# Patient Record
Sex: FEMALE | Race: WHITE | NOT HISPANIC OR LATINO | Employment: OTHER | URBAN - METROPOLITAN AREA
[De-identification: names, ages, dates, MRNs, and addresses within clinical notes are randomized per-mention and may not be internally consistent; named-entity substitution may affect disease eponyms.]

---

## 2017-02-24 RX ORDER — ESTRADIOL 0.1 MG/G
1 CREAM VAGINAL
Qty: 42.5 G | Refills: 3 | Status: SHIPPED | OUTPATIENT
Start: 2017-02-27 | End: 2017-03-01 | Stop reason: SDUPTHER

## 2017-02-26 ENCOUNTER — PATIENT MESSAGE (OUTPATIENT)
Dept: OBSTETRICS AND GYNECOLOGY | Facility: CLINIC | Age: 72
End: 2017-02-26

## 2017-02-27 ENCOUNTER — PATIENT MESSAGE (OUTPATIENT)
Dept: OBSTETRICS AND GYNECOLOGY | Facility: CLINIC | Age: 72
End: 2017-02-27

## 2017-02-27 NOTE — TELEPHONE ENCOUNTER
Voicemail box not set up.    Patient answered on second number and I let her know her prescription was sent in to the pharmacy.

## 2017-03-01 RX ORDER — ESTRADIOL 0.1 MG/G
1 CREAM VAGINAL
Qty: 42.5 G | Refills: 3 | Status: SHIPPED | OUTPATIENT
Start: 2017-03-02 | End: 2017-12-09 | Stop reason: SDUPTHER

## 2017-03-27 ENCOUNTER — PATIENT MESSAGE (OUTPATIENT)
Dept: OBSTETRICS AND GYNECOLOGY | Facility: CLINIC | Age: 72
End: 2017-03-27

## 2017-10-16 ENCOUNTER — PATIENT MESSAGE (OUTPATIENT)
Dept: OBSTETRICS AND GYNECOLOGY | Facility: CLINIC | Age: 72
End: 2017-10-16

## 2017-10-16 DIAGNOSIS — B37.31 CANDIDAL VAGINITIS: ICD-10-CM

## 2017-10-16 RX ORDER — CLOTRIMAZOLE AND BETAMETHASONE DIPROPIONATE 10; .64 MG/G; MG/G
CREAM TOPICAL
Qty: 45 G | Refills: 1 | Status: SHIPPED | OUTPATIENT
Start: 2017-10-16 | End: 2018-10-16

## 2017-10-16 RX ORDER — FLUCONAZOLE 150 MG/1
150 TABLET ORAL DAILY
Qty: 3 TABLET | Refills: 1 | Status: SHIPPED | OUTPATIENT
Start: 2017-10-16 | End: 2017-11-09 | Stop reason: ALTCHOICE

## 2017-11-09 ENCOUNTER — OFFICE VISIT (OUTPATIENT)
Dept: OBSTETRICS AND GYNECOLOGY | Facility: CLINIC | Age: 72
End: 2017-11-09
Payer: MEDICARE

## 2017-11-09 VITALS
BODY MASS INDEX: 24.2 KG/M2 | DIASTOLIC BLOOD PRESSURE: 68 MMHG | HEIGHT: 64 IN | WEIGHT: 141.75 LBS | SYSTOLIC BLOOD PRESSURE: 128 MMHG

## 2017-11-09 DIAGNOSIS — R30.0 DYSURIA: Primary | ICD-10-CM

## 2017-11-09 PROCEDURE — 99999 PR PBB SHADOW E&M-EST. PATIENT-LVL III: CPT | Mod: PBBFAC,,, | Performed by: NURSE PRACTITIONER

## 2017-11-09 PROCEDURE — 99213 OFFICE O/P EST LOW 20 MIN: CPT | Mod: S$GLB,,, | Performed by: NURSE PRACTITIONER

## 2017-11-09 PROCEDURE — 87086 URINE CULTURE/COLONY COUNT: CPT

## 2017-11-09 RX ORDER — NITROFURANTOIN 25; 75 MG/1; MG/1
100 CAPSULE ORAL 2 TIMES DAILY
Qty: 14 CAPSULE | Refills: 0 | Status: SHIPPED | OUTPATIENT
Start: 2017-11-09 | End: 2017-11-16

## 2017-11-09 RX ORDER — PHENAZOPYRIDINE HYDROCHLORIDE 200 MG/1
200 TABLET, FILM COATED ORAL 3 TIMES DAILY PRN
Qty: 9 TABLET | Refills: 1 | Status: SHIPPED | OUTPATIENT
Start: 2017-11-09 | End: 2017-11-19

## 2017-11-09 RX ORDER — LEVOCETIRIZINE DIHYDROCHLORIDE 5 MG/1
5 TABLET, FILM COATED ORAL DAILY
COMMUNITY
End: 2019-01-22

## 2017-11-09 NOTE — PROGRESS NOTES
"CC: Pain with voiding    Lucy Staples is a 71 y.o. female  presents for c/o dysuria for 4 days. No flank or pelvic pain. No fever or hematuria, Urine in clinic dip; positive leucocytes. Patient was seen in Urgent care and given Bactrim.    Past Medical History:   Diagnosis Date    Hyperlipidemia     Hypertension     IBS (irritable bowel syndrome)      Past Surgical History:   Procedure Laterality Date    HYSTERECTOMY      SINUS SURGERY       Social History     Social History    Marital status:      Spouse name: N/A    Number of children: N/A    Years of education: N/A     Occupational History    Not on file.     Social History Main Topics    Smoking status: Never Smoker    Smokeless tobacco: Never Used    Alcohol use No    Drug use: No    Sexual activity: No     Other Topics Concern    Not on file     Social History Narrative    No narrative on file     Family History   Problem Relation Age of Onset    Breast cancer Neg Hx     Colon cancer Neg Hx     Ovarian cancer Neg Hx      OB History      Para Term  AB Living    1 1 1     1    SAB TAB Ectopic Multiple Live Births                       Ht 5' 4" (1.626 m)   Wt 64.3 kg (141 lb 12.1 oz)   BMI 24.33 kg/m²       ROS:  GENERAL: Denies weight gain or weight loss. Feeling well overall  ABDOMEN: No abdominal pain, constipation, diarrhea, nausea, vomiting or rectal bleeding.   URINARY: HPI    PHYSICAL EXAM:  APPEARANCE: Well nourished, well developed, in no acute distress.  AFFECT: WNL, alert and oriented x 3  ABDOMEN: Soft.  No tenderness or masses.       PLAN:  Urine sent for cx  Stop Bactrim  Macrobid and Pyridium rx                "

## 2017-11-10 LAB — BACTERIA UR CULT: NORMAL

## 2017-11-13 ENCOUNTER — PATIENT MESSAGE (OUTPATIENT)
Dept: OBSTETRICS AND GYNECOLOGY | Facility: CLINIC | Age: 72
End: 2017-11-13

## 2017-12-01 ENCOUNTER — OFFICE VISIT (OUTPATIENT)
Dept: OBSTETRICS AND GYNECOLOGY | Facility: CLINIC | Age: 72
End: 2017-12-01
Payer: MEDICARE

## 2017-12-01 VITALS
WEIGHT: 142.19 LBS | SYSTOLIC BLOOD PRESSURE: 130 MMHG | BODY MASS INDEX: 24.28 KG/M2 | DIASTOLIC BLOOD PRESSURE: 78 MMHG | HEIGHT: 64 IN

## 2017-12-01 DIAGNOSIS — R30.0 DYSURIA: ICD-10-CM

## 2017-12-01 DIAGNOSIS — N89.8 VAGINAL IRRITATION: Primary | ICD-10-CM

## 2017-12-01 PROCEDURE — 87660 TRICHOMONAS VAGIN DIR PROBE: CPT

## 2017-12-01 PROCEDURE — 87480 CANDIDA DNA DIR PROBE: CPT

## 2017-12-01 PROCEDURE — 87186 SC STD MICRODIL/AGAR DIL: CPT

## 2017-12-01 PROCEDURE — 87086 URINE CULTURE/COLONY COUNT: CPT

## 2017-12-01 PROCEDURE — 87077 CULTURE AEROBIC IDENTIFY: CPT

## 2017-12-01 PROCEDURE — 99213 OFFICE O/P EST LOW 20 MIN: CPT | Mod: S$GLB,,, | Performed by: NURSE PRACTITIONER

## 2017-12-01 PROCEDURE — 99999 PR PBB SHADOW E&M-EST. PATIENT-LVL III: CPT | Mod: PBBFAC,,, | Performed by: NURSE PRACTITIONER

## 2017-12-01 PROCEDURE — 87088 URINE BACTERIA CULTURE: CPT

## 2017-12-01 NOTE — PROGRESS NOTES
"CC: Possible yeast infection     Lucy Staples is a 72 y.o. female  presents with concerns of possible yeast infection.Patient reports itching and irritation. Patient states that she has mild dysuria. Urine in clinic indicated nitrates.      Past Medical History:   Diagnosis Date    Hyperlipidemia     Hypertension     IBS (irritable bowel syndrome)     Rectocele     Recurrent UTI      Past Surgical History:   Procedure Laterality Date    HYSTERECTOMY      SINUS SURGERY       Family History   Problem Relation Age of Onset    Breast cancer Neg Hx     Colon cancer Neg Hx     Ovarian cancer Neg Hx      Social History   Substance Use Topics    Smoking status: Never Smoker    Smokeless tobacco: Never Used    Alcohol use No     OB History      Para Term  AB Living    1 1 1     1    SAB TAB Ectopic Multiple Live Births                       /78 (BP Location: Right arm, Patient Position: Sitting, BP Method: Medium (Manual))   Ht 5' 4" (1.626 m)   Wt 64.5 kg (142 lb 3.2 oz)   BMI 24.41 kg/m²     ROS:  GENERAL: Denies weight gain or weight loss. Feeling well overall.   CHEST: Denies chest pain or shortness of breath.   CARDIOVASCULAR: Denies palpitations or left sided chest pain.   ABDOMEN: No abdominal pain, constipation, diarrhea, nausea, vomiting or rectal bleeding.   URINARY:HPI  REPRODUCTIVE: See HPI.       PE:   APPEARANCE: Older female, in no acute distress.  AFFECT: WNL, alert and oriented x 3.  PELVIC: Normal external female genitalia without lesions.Vagina atrophic without lesions or discharge. Grade III rectocele.     1. Vaginal irritation  Vaginosis Screen by DNA Probe   2. Dysuria  Urine culture    PLAN:  Urine sent for cx  Affirm cx, exam was unremarkable  Will wit to treat per patient request      Patient was counseled today on A.C.S. Pap guidelines and recommendations for yearly pelvic exams, mammograms and monthly self breast exams; to see her PCP for " other health maintenance.

## 2017-12-02 ENCOUNTER — PATIENT MESSAGE (OUTPATIENT)
Dept: OBSTETRICS AND GYNECOLOGY | Facility: CLINIC | Age: 72
End: 2017-12-02

## 2017-12-03 ENCOUNTER — PATIENT MESSAGE (OUTPATIENT)
Dept: OBSTETRICS AND GYNECOLOGY | Facility: CLINIC | Age: 72
End: 2017-12-03

## 2017-12-03 LAB
CANDIDA RRNA VAG QL PROBE: NEGATIVE
G VAGINALIS RRNA GENITAL QL PROBE: NEGATIVE
T VAGINALIS RRNA GENITAL QL PROBE: NEGATIVE

## 2017-12-04 LAB — BACTERIA UR CULT: NORMAL

## 2017-12-04 RX ORDER — NITROFURANTOIN 25; 75 MG/1; MG/1
100 CAPSULE ORAL 2 TIMES DAILY
Qty: 14 CAPSULE | Refills: 0 | Status: SHIPPED | OUTPATIENT
Start: 2017-12-04 | End: 2017-12-04

## 2017-12-04 RX ORDER — CIPROFLOXACIN 250 MG/1
250 TABLET, FILM COATED ORAL 2 TIMES DAILY
Qty: 14 TABLET | Refills: 0 | Status: SHIPPED | OUTPATIENT
Start: 2017-12-04 | End: 2017-12-11

## 2017-12-11 RX ORDER — ESTRADIOL 0.1 MG/G
CREAM VAGINAL
Qty: 42.5 G | Refills: 3 | Status: SHIPPED | OUTPATIENT
Start: 2017-12-11 | End: 2019-01-15 | Stop reason: SDUPTHER

## 2017-12-21 ENCOUNTER — TELEPHONE (OUTPATIENT)
Dept: OBSTETRICS AND GYNECOLOGY | Facility: CLINIC | Age: 72
End: 2017-12-21

## 2017-12-21 DIAGNOSIS — R30.0 DYSURIA: Primary | ICD-10-CM

## 2017-12-21 NOTE — TELEPHONE ENCOUNTER
----- Message from Ritika Johnson sent at 12/21/2017 11:21 AM CST -----  Contact: pt  Calling to be worked into the schedule at sooner date of her appointment that is scheduled on 01/11/18 for a severe UTI and please advise [phone] (home)

## 2017-12-21 NOTE — TELEPHONE ENCOUNTER
Spoke to patient and she was crying and upset because she could not get a sooner appointment and was having trouble getting in to her [facility] account to email provider.    Patient stated she is hurting and having UTI symptoms and that ROSAURA Tidwell had prescribed Cipro for her recently and it worked wonderfully and would like another prescription.  Will send to Dr. Mojica for advice.  ROSAURA Tidwell out.

## 2017-12-21 NOTE — TELEPHONE ENCOUNTER
I can treat her for a UTI, but would recommend that she come in to give a urine specimen first, so that I can be sure she is taking the correct antibiotic.  Can she come to the lab today?

## 2017-12-22 ENCOUNTER — PATIENT MESSAGE (OUTPATIENT)
Dept: OBSTETRICS AND GYNECOLOGY | Facility: CLINIC | Age: 72
End: 2017-12-22

## 2017-12-22 ENCOUNTER — LAB VISIT (OUTPATIENT)
Dept: LAB | Facility: HOSPITAL | Age: 72
End: 2017-12-22
Attending: OBSTETRICS & GYNECOLOGY
Payer: MEDICARE

## 2017-12-22 DIAGNOSIS — R30.0 DYSURIA: ICD-10-CM

## 2017-12-22 LAB
BACTERIA #/AREA URNS AUTO: NORMAL /HPF
BILIRUB UR QL STRIP: NEGATIVE
CLARITY UR REFRACT.AUTO: ABNORMAL
COLOR UR AUTO: YELLOW
GLUCOSE UR QL STRIP: NEGATIVE
HGB UR QL STRIP: NEGATIVE
KETONES UR QL STRIP: NEGATIVE
LEUKOCYTE ESTERASE UR QL STRIP: NEGATIVE
MICROSCOPIC COMMENT: NORMAL
NITRITE UR QL STRIP: NEGATIVE
PH UR STRIP: 8 [PH] (ref 5–8)
PROT UR QL STRIP: NEGATIVE
RBC #/AREA URNS AUTO: 1 /HPF (ref 0–4)
SP GR UR STRIP: 1 (ref 1–1.03)
SQUAMOUS #/AREA URNS AUTO: 5 /HPF
URN SPEC COLLECT METH UR: ABNORMAL
UROBILINOGEN UR STRIP-ACNC: NEGATIVE EU/DL
WBC #/AREA URNS AUTO: 2 /HPF (ref 0–5)

## 2017-12-22 PROCEDURE — 81001 URINALYSIS AUTO W/SCOPE: CPT

## 2017-12-22 PROCEDURE — 87086 URINE CULTURE/COLONY COUNT: CPT

## 2017-12-24 LAB — BACTERIA UR CULT: NORMAL

## 2017-12-26 ENCOUNTER — PATIENT MESSAGE (OUTPATIENT)
Dept: OBSTETRICS AND GYNECOLOGY | Facility: CLINIC | Age: 72
End: 2017-12-26

## 2017-12-27 RX ORDER — CIPROFLOXACIN 250 MG/1
250 TABLET, FILM COATED ORAL 2 TIMES DAILY
Qty: 10 TABLET | Refills: 0 | Status: SHIPPED | OUTPATIENT
Start: 2017-12-27 | End: 2018-01-01

## 2018-01-11 ENCOUNTER — OFFICE VISIT (OUTPATIENT)
Dept: OBSTETRICS AND GYNECOLOGY | Facility: CLINIC | Age: 73
End: 2018-01-11
Payer: MEDICARE

## 2018-01-11 VITALS
WEIGHT: 142.88 LBS | HEIGHT: 65 IN | DIASTOLIC BLOOD PRESSURE: 70 MMHG | SYSTOLIC BLOOD PRESSURE: 130 MMHG | BODY MASS INDEX: 23.81 KG/M2

## 2018-01-11 DIAGNOSIS — N81.6 RECTOCELE: Primary | ICD-10-CM

## 2018-01-11 PROCEDURE — 99999 PR PBB SHADOW E&M-EST. PATIENT-LVL II: CPT | Mod: PBBFAC,,, | Performed by: OBSTETRICS & GYNECOLOGY

## 2018-01-11 PROCEDURE — 99213 OFFICE O/P EST LOW 20 MIN: CPT | Mod: S$GLB,,, | Performed by: OBSTETRICS & GYNECOLOGY

## 2018-01-12 RX ORDER — NITROFURANTOIN 25; 75 MG/1; MG/1
100 CAPSULE ORAL 2 TIMES DAILY
Qty: 14 CAPSULE | Refills: 0 | Status: SHIPPED | OUTPATIENT
Start: 2018-01-12 | End: 2018-01-19

## 2018-01-12 NOTE — PROGRESS NOTES
HPI:  72 y.o.  female patient  presents today for follow up of frequent UTI's, rectocele and urinary incontinence.  She wears a small pad daily, but typically has minimal leakage.  She reports she doesn't typically have significant symptoms with the rectocele unless she has problems with constipation.  Takes medications that help with normal bowel function.  She has occasional tissue protrusion from the vagina, but can easily manually replace this back in.  No significant pelvic pain or pressure.  She has had a hysterectomy with ovaries remaining.  She uses estrogen vaginal cream.  She has been having 2-3 UTI's per year and is concerned about recurrence of these.  She has severe pain when these occur and is concerned about how to access treatment quickly when these occur.  No UTI symptoms today.      Review of Systems:    Constitutional:  Negative for fatigue and unexpected weight change  Breasts:  Negative for masses, tenderness, or nipple discharge  Gastrointestinal:  Negative for nausea, appetite change, and abdominal distension  Genitourinary:  Negative for dysuria, frequency, or urgency  Gyn:  Negative for vaginal bleeding, pelvic pain, or vaginal discharge       Physical Exam:    Constitutional:  She appears well developed and well nourished.  No distress  Abdominal:  Soft, no distension.  No tenderness.  No masses  Pelvic:  Vulva:  No lesions.  Normal female genitalia  Urethral Meatus:  Normal size and location.  No lesions.  No prolapse.  Urethra:  No masses, tenderness, prolapse, or scarring  Vagina:  No lesions or discharge.  Third degree rectocele.  Second degree cystocele  Cervix:  Surgically absent  Uterus:  Surgically absent  Adnexa:  No masses or tenderness  Anus and Perineum:  No lesions.  No external hemorrhoids      ASSESSMENT:  1. Rectocele     2.      Recurrent UTI      PLAN:  Discussed with patient risks vs benefits of surgical correction of her rectocele.  Due to her age and the fact  that her symptoms are not that bothersome to her, she prefers to not undergo surgical treatment at this time.  Discussed prevention measures for UTI.  She will let us know if she has any symptoms, so that a urine sample can be collected.  Macrobid Rx given to patient to use if needed.  However, she was advised that I always recommend giving a urine sample at the lab prior to starting antibiotics.

## 2018-02-06 ENCOUNTER — PATIENT MESSAGE (OUTPATIENT)
Dept: OBSTETRICS AND GYNECOLOGY | Facility: CLINIC | Age: 73
End: 2018-02-06

## 2018-02-06 RX ORDER — FLUCONAZOLE 150 MG/1
150 TABLET ORAL ONCE
Qty: 2 TABLET | Refills: 0 | Status: SHIPPED | OUTPATIENT
Start: 2018-02-06 | End: 2018-02-06

## 2018-02-19 ENCOUNTER — LAB VISIT (OUTPATIENT)
Dept: LAB | Facility: HOSPITAL | Age: 73
End: 2018-02-19
Attending: OBSTETRICS & GYNECOLOGY
Payer: MEDICARE

## 2018-02-19 ENCOUNTER — PATIENT MESSAGE (OUTPATIENT)
Dept: OBSTETRICS AND GYNECOLOGY | Facility: CLINIC | Age: 73
End: 2018-02-19

## 2018-02-19 ENCOUNTER — TELEPHONE (OUTPATIENT)
Dept: OBSTETRICS AND GYNECOLOGY | Facility: CLINIC | Age: 73
End: 2018-02-19

## 2018-02-19 DIAGNOSIS — R30.0 DYSURIA: ICD-10-CM

## 2018-02-19 DIAGNOSIS — R30.0 DYSURIA: Primary | ICD-10-CM

## 2018-02-19 LAB
BACTERIA #/AREA URNS AUTO: ABNORMAL /HPF
BILIRUB UR QL STRIP: NEGATIVE
CAOX CRY UR QL COMP ASSIST: ABNORMAL
CLARITY UR REFRACT.AUTO: ABNORMAL
COLOR UR AUTO: ABNORMAL
GLUCOSE UR QL STRIP: NEGATIVE
HGB UR QL STRIP: ABNORMAL
HYALINE CASTS UR QL AUTO: 53 /LPF
KETONES UR QL STRIP: ABNORMAL
LEUKOCYTE ESTERASE UR QL STRIP: ABNORMAL
MICROSCOPIC COMMENT: ABNORMAL
NITRITE UR QL STRIP: NEGATIVE
PH UR STRIP: 5 [PH] (ref 5–8)
PROT UR QL STRIP: ABNORMAL
RBC #/AREA URNS AUTO: 10 /HPF (ref 0–4)
SP GR UR STRIP: 1.02 (ref 1–1.03)
SQUAMOUS #/AREA URNS AUTO: 10 /HPF
URN SPEC COLLECT METH UR: ABNORMAL
UROBILINOGEN UR STRIP-ACNC: NEGATIVE EU/DL
WBC #/AREA URNS AUTO: >100 /HPF (ref 0–5)
WBC CLUMPS UR QL AUTO: ABNORMAL

## 2018-02-19 PROCEDURE — 87088 URINE BACTERIA CULTURE: CPT

## 2018-02-19 PROCEDURE — 87086 URINE CULTURE/COLONY COUNT: CPT

## 2018-02-19 PROCEDURE — 81001 URINALYSIS AUTO W/SCOPE: CPT

## 2018-02-19 PROCEDURE — 87077 CULTURE AEROBIC IDENTIFY: CPT

## 2018-02-19 PROCEDURE — 87186 SC STD MICRODIL/AGAR DIL: CPT

## 2018-02-19 NOTE — TELEPHONE ENCOUNTER
Patient walks in to the [facility] complaining of UTI symptoms. She is requesting a UA.  Ordered and scheduled.

## 2018-02-20 ENCOUNTER — PATIENT MESSAGE (OUTPATIENT)
Dept: OBSTETRICS AND GYNECOLOGY | Facility: CLINIC | Age: 73
End: 2018-02-20

## 2018-02-20 RX ORDER — CIPROFLOXACIN 250 MG/1
250 TABLET, FILM COATED ORAL 2 TIMES DAILY
Qty: 10 TABLET | Refills: 0 | Status: SHIPPED | OUTPATIENT
Start: 2018-02-20 | End: 2018-02-25

## 2018-02-21 LAB — BACTERIA UR CULT: NORMAL

## 2018-03-01 ENCOUNTER — PATIENT MESSAGE (OUTPATIENT)
Dept: OBSTETRICS AND GYNECOLOGY | Facility: CLINIC | Age: 73
End: 2018-03-01

## 2018-03-01 RX ORDER — FLUCONAZOLE 150 MG/1
150 TABLET ORAL ONCE
Qty: 2 TABLET | Refills: 0 | Status: SHIPPED | OUTPATIENT
Start: 2018-03-01 | End: 2018-03-01

## 2018-05-18 ENCOUNTER — TELEPHONE (OUTPATIENT)
Dept: OBSTETRICS AND GYNECOLOGY | Facility: CLINIC | Age: 73
End: 2018-05-18

## 2018-05-18 DIAGNOSIS — Z12.39 BREAST CANCER SCREENING: Primary | ICD-10-CM

## 2018-05-18 NOTE — TELEPHONE ENCOUNTER
----- Message from Toma Armando sent at 5/18/2018  9:12 AM CDT -----  Contact: pt  florin input mammo order for 8/3 appt.

## 2018-08-12 ENCOUNTER — PATIENT MESSAGE (OUTPATIENT)
Dept: OBSTETRICS AND GYNECOLOGY | Facility: CLINIC | Age: 73
End: 2018-08-12

## 2018-08-13 RX ORDER — FLUCONAZOLE 150 MG/1
150 TABLET ORAL ONCE
Qty: 2 TABLET | Refills: 0 | Status: SHIPPED | OUTPATIENT
Start: 2018-08-13 | End: 2018-08-13

## 2018-11-06 ENCOUNTER — PATIENT MESSAGE (OUTPATIENT)
Dept: OBSTETRICS AND GYNECOLOGY | Facility: CLINIC | Age: 73
End: 2018-11-06

## 2018-11-06 RX ORDER — FLUCONAZOLE 150 MG/1
150 TABLET ORAL
Qty: 2 TABLET | Refills: 0 | Status: SHIPPED | OUTPATIENT
Start: 2018-11-06 | End: 2019-01-31 | Stop reason: ALTCHOICE

## 2018-11-06 NOTE — TELEPHONE ENCOUNTER
Pt. Is requesting diflucan pills because over the counter medication is not working. Please advise.

## 2018-11-06 NOTE — TELEPHONE ENCOUNTER
Diflucan Rx sent in.  Please schedule patient for annual exam and MMG with either Jerri Tidwell or me.

## 2019-01-02 ENCOUNTER — TELEPHONE (OUTPATIENT)
Dept: OBSTETRICS AND GYNECOLOGY | Facility: CLINIC | Age: 74
End: 2019-01-02

## 2019-01-02 NOTE — TELEPHONE ENCOUNTER
Pt. Sent appointment request. Attempted to call patient. No answer. Wanted to let patient know that her appointments were rescheduled for 01/22/19 at the [facility] location.

## 2019-01-15 ENCOUNTER — PATIENT MESSAGE (OUTPATIENT)
Dept: OBSTETRICS AND GYNECOLOGY | Facility: CLINIC | Age: 74
End: 2019-01-15

## 2019-01-15 RX ORDER — ESTRADIOL 0.1 MG/G
CREAM VAGINAL
Qty: 42.5 G | Refills: 3 | Status: SHIPPED | OUTPATIENT
Start: 2019-01-15 | End: 2019-01-22 | Stop reason: SDUPTHER

## 2019-01-22 ENCOUNTER — OFFICE VISIT (OUTPATIENT)
Dept: OBSTETRICS AND GYNECOLOGY | Facility: CLINIC | Age: 74
End: 2019-01-22
Payer: MEDICARE

## 2019-01-22 ENCOUNTER — HOSPITAL ENCOUNTER (OUTPATIENT)
Dept: RADIOLOGY | Facility: HOSPITAL | Age: 74
Discharge: HOME | End: 2019-01-22
Attending: OBSTETRICS & GYNECOLOGY
Payer: MEDICARE

## 2019-01-22 VITALS
BODY MASS INDEX: 22.59 KG/M2 | HEIGHT: 65 IN | SYSTOLIC BLOOD PRESSURE: 142 MMHG | WEIGHT: 135.56 LBS | DIASTOLIC BLOOD PRESSURE: 74 MMHG

## 2019-01-22 DIAGNOSIS — N81.6 RECTOCELE: ICD-10-CM

## 2019-01-22 DIAGNOSIS — N81.4 UTERINE PROLAPSE: ICD-10-CM

## 2019-01-22 DIAGNOSIS — Z00.00 PREVENTATIVE HEALTH CARE: ICD-10-CM

## 2019-01-22 DIAGNOSIS — Z78.0 OSTEOPENIA AFTER MENOPAUSE: Primary | ICD-10-CM

## 2019-01-22 DIAGNOSIS — M85.80 OSTEOPENIA AFTER MENOPAUSE: Primary | ICD-10-CM

## 2019-01-22 DIAGNOSIS — Z12.39 BREAST CANCER SCREENING: ICD-10-CM

## 2019-01-22 DIAGNOSIS — Z01.419 NORMAL GYNECOLOGIC EXAMINATION: ICD-10-CM

## 2019-01-22 PROCEDURE — G0101 PR CA SCREEN;PELVIC/BREAST EXAM: ICD-10-PCS | Mod: S$GLB,,, | Performed by: NURSE PRACTITIONER

## 2019-01-22 PROCEDURE — 77063 BREAST TOMOSYNTHESIS BI: CPT | Mod: 26,,, | Performed by: RADIOLOGY

## 2019-01-22 PROCEDURE — 77067 SCR MAMMO BI INCL CAD: CPT | Mod: 26,,, | Performed by: RADIOLOGY

## 2019-01-22 PROCEDURE — G0101 CA SCREEN;PELVIC/BREAST EXAM: HCPCS | Mod: S$GLB,,, | Performed by: NURSE PRACTITIONER

## 2019-01-22 PROCEDURE — 77063 MAMMO DIGITAL SCREENING BILAT WITH TOMOSYNTHESIS_CAD: ICD-10-PCS | Mod: 26,,, | Performed by: RADIOLOGY

## 2019-01-22 PROCEDURE — 99999 PR PBB SHADOW E&M-EST. PATIENT-LVL II: ICD-10-PCS | Mod: PBBFAC,,, | Performed by: NURSE PRACTITIONER

## 2019-01-22 PROCEDURE — 99999 PR PBB SHADOW E&M-EST. PATIENT-LVL II: CPT | Mod: PBBFAC,,, | Performed by: NURSE PRACTITIONER

## 2019-01-22 PROCEDURE — 77067 SCR MAMMO BI INCL CAD: CPT | Mod: TC

## 2019-01-22 PROCEDURE — 77067 MAMMO DIGITAL SCREENING BILAT WITH TOMOSYNTHESIS_CAD: ICD-10-PCS | Mod: 26,,, | Performed by: RADIOLOGY

## 2019-01-22 RX ORDER — ESTRADIOL 0.1 MG/G
CREAM VAGINAL
Qty: 42.5 G | Refills: 3 | Status: SHIPPED | OUTPATIENT
Start: 2019-01-22 | End: 2020-08-21

## 2019-01-22 RX ORDER — MONTELUKAST SODIUM 10 MG/1
10 TABLET ORAL
COMMUNITY
End: 2019-01-31

## 2019-01-22 NOTE — PROGRESS NOTES
"CC: Well woman exam    Lucy Staples is a 73 y.o. female  presents for a well woman exam.  No issues, problems, or complaints. States that Estrace cream has resolved her vaginal atrophy. Osteopenia per 2016, bone scan, Not sexually active. mammogram today.     Past Medical History:   Diagnosis Date    Hyperlipidemia     Hypertension     IBS (irritable bowel syndrome)     Rectocele     Recurrent UTI      Past Surgical History:   Procedure Laterality Date    HYSTERECTOMY      SINUS SURGERY       Family History   Problem Relation Age of Onset    Breast cancer Neg Hx     Colon cancer Neg Hx     Ovarian cancer Neg Hx      Social History     Tobacco Use    Smoking status: Never Smoker    Smokeless tobacco: Never Used   Substance Use Topics    Alcohol use: No    Drug use: No     OB History      Para Term  AB Living    1 1 1     1    SAB TAB Ectopic Multiple Live Births            1          BP (!) 142/74 (BP Location: Left arm, Patient Position: Sitting, BP Method: Medium (Manual))   Ht 5' 5" (1.651 m)   Wt 61.5 kg (135 lb 9.3 oz)   BMI 22.56 kg/m²     ROS:  GENERAL: Denies weight gain or weight loss. Feeling well overall.   SKIN: Denies rash or lesions.   HEAD: Denies head injury or headache.   NODES: Denies enlarged lymph nodes.   CHEST: Denies chest pain or shortness of breath.   CARDIOVASCULAR: Denies palpitations or left sided chest pain.   ABDOMEN: No abdominal pain, constipation, diarrhea, nausea, vomiting or rectal bleeding.   URINARY: No frequency, dysuria, hematuria, or burning on urination.  REPRODUCTIVE: See HPI.   BREASTS: The patient performs breast self-examination and denies pain, lumps, or nipple discharge.   HEMATOLOGIC: No easy bruisability or excessive bleeding.   MUSCULOSKELETAL: Denies joint pain or swelling.   NEUROLOGIC: Denies syncope or weakness.   PSYCHIATRIC: Denies depression, anxiety or mood swings.    PE:   APPEARANCE: Well nourished, well " developed, in no acute distress.  AFFECT: WNL, alert and oriented x 3.  SKIN: No acne or hirsutism.  NECK: Neck symmetric without masses or thyromegaly.  NODES: No inguinal, cervical, axillary or femoral lymph node enlargement.  CHEST: Good respiratory effort.   ABDOMEN: Soft. No tenderness or masses. No hepatosplenomegaly. No hernias.  BREASTS: Symmetrical, no skin changes or visible lesions. No palpable masses, nipple discharge bilaterally.  PELVIC: Normal external female genitalia without lesions. Normal hair distribution. Adequate perineal body, normal urethral meatus. Vagina atrophic without lesions or discharge. Grade 3 uterine prolapse and rectocele.  Bimanual exam shows uterus and cervix to be surgically absent. Adnexa without masses or tenderness.  RECTAL: Rectovaginal exam confirms above with normal sphincter tone, no masses.  EXTREMITIES: No edema.    1. Osteopenia after menopause  DXA Bone Density Spine And Hip     PLAN:  Patient to come back for pessary fitting  Bone scan      Patient was counseled today on A.C.S. Pap guidelines and recommendations for yearly pelvic exams, mammograms and monthly self breast exams; to see her PCP for other health maintenance.

## 2019-01-31 ENCOUNTER — APPOINTMENT (OUTPATIENT)
Dept: RADIOLOGY | Facility: HOSPITAL | Age: 74
End: 2019-01-31
Attending: NURSE PRACTITIONER
Payer: MEDICARE

## 2019-01-31 ENCOUNTER — TELEPHONE (OUTPATIENT)
Dept: OBSTETRICS AND GYNECOLOGY | Facility: CLINIC | Age: 74
End: 2019-01-31

## 2019-01-31 ENCOUNTER — PROCEDURE VISIT (OUTPATIENT)
Dept: OBSTETRICS AND GYNECOLOGY | Facility: CLINIC | Age: 74
End: 2019-01-31
Payer: MEDICARE

## 2019-01-31 VITALS
DIASTOLIC BLOOD PRESSURE: 68 MMHG | BODY MASS INDEX: 22.63 KG/M2 | WEIGHT: 135.81 LBS | HEIGHT: 65 IN | SYSTOLIC BLOOD PRESSURE: 128 MMHG

## 2019-01-31 DIAGNOSIS — Z78.0 OSTEOPENIA AFTER MENOPAUSE: ICD-10-CM

## 2019-01-31 DIAGNOSIS — N39.0 URINARY TRACT INFECTION WITHOUT HEMATURIA, SITE UNSPECIFIED: Primary | ICD-10-CM

## 2019-01-31 DIAGNOSIS — M85.80 OSTEOPENIA AFTER MENOPAUSE: ICD-10-CM

## 2019-01-31 DIAGNOSIS — Z46.89 ENCOUNTER FOR FITTING AND ADJUSTMENT OF PESSARY: ICD-10-CM

## 2019-01-31 PROCEDURE — 77080 DXA BONE DENSITY AXIAL: CPT | Mod: 26,,, | Performed by: RADIOLOGY

## 2019-01-31 PROCEDURE — 87088 URINE BACTERIA CULTURE: CPT

## 2019-01-31 PROCEDURE — 77080 DXA BONE DENSITY AXIAL: CPT | Mod: TC

## 2019-01-31 PROCEDURE — 77080 DEXA BONE DENSITY SPINE HIP: ICD-10-PCS | Mod: 26,,, | Performed by: RADIOLOGY

## 2019-01-31 PROCEDURE — 57160 INSERT PESSARY/OTHER DEVICE: CPT | Mod: S$GLB,,, | Performed by: NURSE PRACTITIONER

## 2019-01-31 PROCEDURE — 57160 PR FIT/INSERT INTRAVAG SUPPORT DEVICE: ICD-10-PCS | Mod: S$GLB,,, | Performed by: NURSE PRACTITIONER

## 2019-01-31 PROCEDURE — 87086 URINE CULTURE/COLONY COUNT: CPT

## 2019-01-31 RX ORDER — NITROFURANTOIN 25; 75 MG/1; MG/1
100 CAPSULE ORAL 2 TIMES DAILY
Qty: 14 CAPSULE | Refills: 0 | Status: SHIPPED | OUTPATIENT
Start: 2019-01-31 | End: 2019-02-07

## 2019-01-31 NOTE — PROGRESS NOTES
Please call patient and inform her that dexa bone scan indicated Osteopenia. This is the beginning of bone loss. She should be walking 4-5 times/ weekly. On vit D and calcium supplements daily.

## 2019-01-31 NOTE — TELEPHONE ENCOUNTER
----- Message from Jerri Tidwell NP sent at 1/31/2019  2:25 PM CST -----  Please call patient and inform her that dexa bone scan indicated Osteopenia. This is the beginning of bone loss. She should be walking 4-5 times/ weekly. On vit D and calcium supplements daily.

## 2019-01-31 NOTE — PROCEDURES
Procedures      74 yr old female presents for a pessary fitting. Complaining of discomfort with prolapse.      PRE-PESSARY FITTING COUNSELING:  The patient was informed of the risks of pain or discomfort, continuous incontinence, urinary retention with insertion of a pessary and malodorous discharge and/or possible bleeding from granulation tissue after wearing the pessary for sometime. She was counseled on the alternatives to pessary insertion, including surgery or no treatment with continued symptoms, and she agrees to proceed.    PROCEDURE:  TIME OUT PERFORMED.  On exam there were no vaginal lesions, no significant discharge, no vaginal bleeding or ulcerations.  The patient was asked to empty her bladder to make the fitting more comfortable.  The prolapse was easily reducible. The Ring with folding pessary 2 3/4 pessary was placed per vagina without difficulty.  The patient was able to sit, stand, walk and either cough or urinate on the toilet after the procedure without expelling the pessary. The patient tolerated the procedure well.  She experienced no discomfort.    POST PESSARY FITTING COUNSELING:  Report worsening urinary incontinency, urinary retention, pain, fever, foul smelling discharge or bleeding.  Self care: pessary removal, cleaning with soap and water, drying, leaving out overnight, recommended weekly.  Use and potential side effects of vaginal hormone cream prescribed per [employer].  May douche with 1 part vinegar and 2 parts water for discharge and odor.  Due to inability to perform self care, importance of keeping follow-up appointments for a pessary check was stressed.  Counseling lasted approximately 15 minutes and all her questions were answered.

## 2019-02-02 LAB — BACTERIA UR CULT: NORMAL

## 2019-02-03 NOTE — PROGRESS NOTES
Please call patient and inform her that urine indicated a normal vaginal nancy, due to the rectocele. She is asymptomatic, will not treat. How is she doing?

## 2019-02-04 ENCOUNTER — PATIENT MESSAGE (OUTPATIENT)
Dept: OBSTETRICS AND GYNECOLOGY | Facility: CLINIC | Age: 74
End: 2019-02-04

## 2019-02-04 ENCOUNTER — TELEPHONE (OUTPATIENT)
Dept: OBSTETRICS AND GYNECOLOGY | Facility: CLINIC | Age: 74
End: 2019-02-04

## 2019-02-04 NOTE — TELEPHONE ENCOUNTER
----- Message from Jerri Tidwell NP sent at 2/3/2019  7:33 AM CST -----  Please call patient and inform her that urine indicated a normal vaginal nancy, due to the rectocele. She is asymptomatic, will not treat. How is she doing?

## 2019-02-04 NOTE — TELEPHONE ENCOUNTER
"Spoke with pt, notified of urine culture results. Pt states she feels fine, pessary is fitting well and she "has a new lease on life."  "

## 2019-02-04 NOTE — TELEPHONE ENCOUNTER
Attempted to contact patient, no answer.  Left patient a voicemail message to call the clinic back.

## 2019-02-28 ENCOUNTER — OFFICE VISIT (OUTPATIENT)
Dept: OBSTETRICS AND GYNECOLOGY | Facility: CLINIC | Age: 74
End: 2019-02-28
Payer: MEDICARE

## 2019-02-28 VITALS
WEIGHT: 140 LBS | SYSTOLIC BLOOD PRESSURE: 136 MMHG | BODY MASS INDEX: 23.32 KG/M2 | HEIGHT: 65 IN | DIASTOLIC BLOOD PRESSURE: 74 MMHG

## 2019-02-28 DIAGNOSIS — Z46.89 ENCOUNTER FOR PESSARY MAINTENANCE: ICD-10-CM

## 2019-02-28 DIAGNOSIS — N81.6 RECTOCELE: Primary | ICD-10-CM

## 2019-02-28 PROCEDURE — 1101F PR PT FALLS ASSESS DOC 0-1 FALLS W/OUT INJ PAST YR: ICD-10-PCS | Mod: CPTII,S$GLB,, | Performed by: NURSE PRACTITIONER

## 2019-02-28 PROCEDURE — 99999 PR PBB SHADOW E&M-EST. PATIENT-LVL III: CPT | Mod: PBBFAC,,, | Performed by: NURSE PRACTITIONER

## 2019-02-28 PROCEDURE — 99213 PR OFFICE/OUTPT VISIT, EST, LEVL III, 20-29 MIN: ICD-10-PCS | Mod: S$GLB,,, | Performed by: NURSE PRACTITIONER

## 2019-02-28 PROCEDURE — 99999 PR PBB SHADOW E&M-EST. PATIENT-LVL III: ICD-10-PCS | Mod: PBBFAC,,, | Performed by: NURSE PRACTITIONER

## 2019-02-28 PROCEDURE — 1101F PT FALLS ASSESS-DOCD LE1/YR: CPT | Mod: CPTII,S$GLB,, | Performed by: NURSE PRACTITIONER

## 2019-02-28 PROCEDURE — 99213 OFFICE O/P EST LOW 20 MIN: CPT | Mod: S$GLB,,, | Performed by: NURSE PRACTITIONER

## 2019-02-28 NOTE — PROGRESS NOTES
"CC: Pessary cleaning    Lucy Staples is a 73 y.o. female  presents for Pessary maintenance. Patient was fitted for a pessary, last visit. Patient reports complete resolve to urinary issues, secondary to cystocele and rectocele. Is using Estrace cream.     Past Medical History:   Diagnosis Date    Hyperlipidemia     Hypertension     IBS (irritable bowel syndrome)     Rectocele     Recurrent UTI      Past Surgical History:   Procedure Laterality Date    HYSTERECTOMY      SINUS SURGERY       Social History     Socioeconomic History    Marital status:      Spouse name: Not on file    Number of children: Not on file    Years of education: Not on file    Highest education level: Not on file   Social Needs    Financial resource strain: Not on file    Food insecurity - worry: Not on file    Food insecurity - inability: Not on file    Transportation needs - medical: Not on file    Transportation needs - non-medical: Not on file   Occupational History    Not on file   Tobacco Use    Smoking status: Never Smoker    Smokeless tobacco: Never Used   Substance and Sexual Activity    Alcohol use: No    Drug use: No    Sexual activity: No   Other Topics Concern    Not on file   Social History Narrative    Not on file     Family History   Problem Relation Age of Onset    Breast cancer Neg Hx     Colon cancer Neg Hx     Ovarian cancer Neg Hx      OB History      Para Term  AB Living    1 1 1     1    SAB TAB Ectopic Multiple Live Births            1          /74 (BP Location: Left arm, Patient Position: Sitting, BP Method: Medium (Manual))   Ht 5' 5" (1.651 m)   Wt 63.5 kg (139 lb 15.9 oz)   BMI 23.30 kg/m²       ROS:  GENERAL: Denies weight gain or weight loss. Feeling well overall.   ABDOMEN: No abdominal pain, constipation, diarrhea, nausea, vomiting or rectal bleeding.   URINARY: No frequency, dysuria, hematuria, or burning on urination.  REPRODUCTIVE: " See HPI.       PHYSICAL EXAM:  APPEARANCE: Well nourished, well developed, in no acute distress.  AFFECT: WNL, alert and oriented x 3  PELVIC:  Vagina atrophy without lesions or discharge. Reduction of Cystocele and rectocele with pessary placement.     PLAN:  Pessary was removed and cleaned with soap and water  Replaced without difficulty  RTC in 4 weeks

## 2019-03-28 ENCOUNTER — OFFICE VISIT (OUTPATIENT)
Dept: OBSTETRICS AND GYNECOLOGY | Facility: CLINIC | Age: 74
End: 2019-03-28
Payer: MEDICARE

## 2019-03-28 VITALS
WEIGHT: 142.88 LBS | DIASTOLIC BLOOD PRESSURE: 62 MMHG | SYSTOLIC BLOOD PRESSURE: 110 MMHG | HEIGHT: 65 IN | BODY MASS INDEX: 23.81 KG/M2

## 2019-03-28 DIAGNOSIS — Z46.89 ENCOUNTER FOR PESSARY MAINTENANCE: Primary | ICD-10-CM

## 2019-03-28 PROCEDURE — 99999 PR PBB SHADOW E&M-EST. PATIENT-LVL III: CPT | Mod: PBBFAC,,, | Performed by: NURSE PRACTITIONER

## 2019-03-28 PROCEDURE — 99999 PR PBB SHADOW E&M-EST. PATIENT-LVL III: ICD-10-PCS | Mod: PBBFAC,,, | Performed by: NURSE PRACTITIONER

## 2019-03-28 PROCEDURE — 1101F PR PT FALLS ASSESS DOC 0-1 FALLS W/OUT INJ PAST YR: ICD-10-PCS | Mod: CPTII,S$GLB,, | Performed by: NURSE PRACTITIONER

## 2019-03-28 PROCEDURE — 1101F PT FALLS ASSESS-DOCD LE1/YR: CPT | Mod: CPTII,S$GLB,, | Performed by: NURSE PRACTITIONER

## 2019-03-28 PROCEDURE — 99213 OFFICE O/P EST LOW 20 MIN: CPT | Mod: S$GLB,,, | Performed by: NURSE PRACTITIONER

## 2019-03-28 PROCEDURE — 99213 PR OFFICE/OUTPT VISIT, EST, LEVL III, 20-29 MIN: ICD-10-PCS | Mod: S$GLB,,, | Performed by: NURSE PRACTITIONER

## 2019-03-28 RX ORDER — SULFAMETHOXAZOLE AND TRIMETHOPRIM 400; 80 MG/1; MG/1
TABLET ORAL
Refills: 1 | COMMUNITY
Start: 2019-03-03 | End: 2019-04-26 | Stop reason: ALTCHOICE

## 2019-03-28 NOTE — PROGRESS NOTES
PRE-PESSARY FITTING COUNSELING:  The patient was informed of the risks of pain or discomfort, continuous incontinence, urinary retention with insertion of a pessary and malodorous discharge and/or possible bleeding from granulation tissue after wearing the pessary for sometime. She was counseled on the alternatives to pessary insertion, including surgery or no treatment with continued symptoms, and she agrees to proceed.    PROCEDURE:   TIME OUT PERFORMED.  The pessary was removed and cleaned with soap and water.  PROCEDURE:  TIME OUT PERFORMED.  On exam there were no vaginal lesions, no significant discharge, no vaginal bleeding or ulcerations.  The patient was asked to empty her bladder to make the fitting more comfortable.  The prolapse was easily reducible. The  pessary was placed per vagina without difficulty.  The patient was able to sit, stand, walk and either cough or urinate on the toilet after the procedure without expelling the pessary. The patient tolerated the procedure well.  She experienced no discomfort.    POST PESSARY FITTING COUNSELING:  Report worsening urinary incontinency, urinary retention, pain, fever, foul smelling discharge or bleeding.  Self care: pessary removal, cleaning with soap and water, drying, leaving out overnight, recommended weekly.  Use and potential side effects of vaginal hormone cream prescribed per [employer].  May douche with 1 part vinegar and 2 parts water for discharge and odor.  Due to inability to perform self care, importance of keeping follow-up appointments for a pessary check was stressed.  Counseling lasted approximately 15 minutes and all her questions were answered.

## 2019-04-26 ENCOUNTER — OFFICE VISIT (OUTPATIENT)
Dept: OBSTETRICS AND GYNECOLOGY | Facility: CLINIC | Age: 74
End: 2019-04-26
Payer: MEDICARE

## 2019-04-26 VITALS
DIASTOLIC BLOOD PRESSURE: 62 MMHG | BODY MASS INDEX: 24.21 KG/M2 | SYSTOLIC BLOOD PRESSURE: 124 MMHG | WEIGHT: 145.31 LBS | HEIGHT: 65 IN

## 2019-04-26 DIAGNOSIS — Z46.89 ENCOUNTER FOR PESSARY MAINTENANCE: Primary | ICD-10-CM

## 2019-04-26 DIAGNOSIS — N81.4 UTERINE PROLAPSE: ICD-10-CM

## 2019-04-26 DIAGNOSIS — N81.6 RECTOCELE: ICD-10-CM

## 2019-04-26 PROCEDURE — 1101F PR PT FALLS ASSESS DOC 0-1 FALLS W/OUT INJ PAST YR: ICD-10-PCS | Mod: CPTII,S$GLB,, | Performed by: NURSE PRACTITIONER

## 2019-04-26 PROCEDURE — 99999 PR PBB SHADOW E&M-EST. PATIENT-LVL III: ICD-10-PCS | Mod: PBBFAC,,, | Performed by: NURSE PRACTITIONER

## 2019-04-26 PROCEDURE — 99213 OFFICE O/P EST LOW 20 MIN: CPT | Mod: S$GLB,,, | Performed by: NURSE PRACTITIONER

## 2019-04-26 PROCEDURE — 99999 PR PBB SHADOW E&M-EST. PATIENT-LVL III: CPT | Mod: PBBFAC,,, | Performed by: NURSE PRACTITIONER

## 2019-04-26 PROCEDURE — 99213 PR OFFICE/OUTPT VISIT, EST, LEVL III, 20-29 MIN: ICD-10-PCS | Mod: S$GLB,,, | Performed by: NURSE PRACTITIONER

## 2019-04-26 PROCEDURE — 1101F PT FALLS ASSESS-DOCD LE1/YR: CPT | Mod: CPTII,S$GLB,, | Performed by: NURSE PRACTITIONER

## 2019-04-26 NOTE — PROGRESS NOTES
PESSARY  COUNSELING:  The patient was informed of the risks of pain or discomfort, continuous incontinence, urinary retention with insertion of a pessary and malodorous discharge and/or possible bleeding from granulation tissue after wearing the pessary for sometime. She was counseled on the alternatives to pessary insertion, including surgery or no treatment with continued symptoms, and she agrees to proceed.    PROCEDURE:   TIME OUT PERFORMED.  The pessary was removed and cleaned with soap and water.  PROCEDURE:  TIME OUT PERFORMED.  On exam there were no vaginal lesions, no significant discharge, no vaginal bleeding or ulcerations.  The patient was asked to empty her bladder to make the fitting more comfortable.  The prolapse was easily reducible. The  pessary was placed per vagina without difficulty.  The patient was able to sit, stand, walk and either cough or urinate on the toilet after the procedure without expelling the pessary. The patient tolerated the procedure well.  She experienced no discomfort.    POST PESSARY FITTING COUNSELING:  Report worsening urinary incontinency, urinary retention, pain, fever, foul smelling discharge or bleeding.  Self care: pessary removal, cleaning with soap and water, drying, leaving out overnight, recommended weekly.  Use and potential side effects of vaginal hormone cream prescribed per [employer].  May douche with 1 part vinegar and 2 parts water for discharge and odor.  Due to inability to perform self care, importance of keeping follow-up appointments for a pessary check was stressed.  Counseling lasted approximately 15 minutes and all her questions were answered.

## 2019-05-15 ENCOUNTER — PATIENT MESSAGE (OUTPATIENT)
Dept: OBSTETRICS AND GYNECOLOGY | Facility: CLINIC | Age: 74
End: 2019-05-15

## 2019-05-27 ENCOUNTER — TELEPHONE (OUTPATIENT)
Dept: OBSTETRICS AND GYNECOLOGY | Facility: CLINIC | Age: 74
End: 2019-05-27

## 2019-05-27 NOTE — TELEPHONE ENCOUNTER
Spoke with patient, informed that provider will be out of the office on 6/5/19, and her appt will need to be rescheduled. Patient agreed to be rescheduled for 6/6/19 at 9am@ the [facility] office.

## 2019-05-27 NOTE — TELEPHONE ENCOUNTER
----- Message from Roxane Matos sent at 5/27/2019 11:41 AM CDT -----  Contact: pt  .Type:  Patient Returning Call    Who Called: pt  Who Left Message for Patient:   Does the patient know what this is regarding?:   Would the patient rather a call back or a response via [facility]?  Call back   Best Call Back Number: [phone] (home)   Additional Information:

## 2019-06-06 ENCOUNTER — OFFICE VISIT (OUTPATIENT)
Dept: OBSTETRICS AND GYNECOLOGY | Facility: CLINIC | Age: 74
End: 2019-06-06
Payer: MEDICARE

## 2019-06-06 VITALS
SYSTOLIC BLOOD PRESSURE: 132 MMHG | DIASTOLIC BLOOD PRESSURE: 70 MMHG | WEIGHT: 149.06 LBS | BODY MASS INDEX: 24.83 KG/M2 | HEIGHT: 65 IN

## 2019-06-06 DIAGNOSIS — N81.6 RECTOCELE: ICD-10-CM

## 2019-06-06 DIAGNOSIS — Z46.89 ENCOUNTER FOR PESSARY MAINTENANCE: Primary | ICD-10-CM

## 2019-06-06 PROCEDURE — 99999 PR PBB SHADOW E&M-EST. PATIENT-LVL III: CPT | Mod: PBBFAC,,, | Performed by: OBSTETRICS & GYNECOLOGY

## 2019-06-06 PROCEDURE — 1101F PT FALLS ASSESS-DOCD LE1/YR: CPT | Mod: CPTII,S$GLB,, | Performed by: OBSTETRICS & GYNECOLOGY

## 2019-06-06 PROCEDURE — 99212 OFFICE O/P EST SF 10 MIN: CPT | Mod: S$GLB,,, | Performed by: OBSTETRICS & GYNECOLOGY

## 2019-06-06 PROCEDURE — 99999 PR PBB SHADOW E&M-EST. PATIENT-LVL III: ICD-10-PCS | Mod: PBBFAC,,, | Performed by: OBSTETRICS & GYNECOLOGY

## 2019-06-06 PROCEDURE — 1101F PR PT FALLS ASSESS DOC 0-1 FALLS W/OUT INJ PAST YR: ICD-10-PCS | Mod: CPTII,S$GLB,, | Performed by: OBSTETRICS & GYNECOLOGY

## 2019-06-06 PROCEDURE — 99212 PR OFFICE/OUTPT VISIT, EST, LEVL II, 10-19 MIN: ICD-10-PCS | Mod: S$GLB,,, | Performed by: OBSTETRICS & GYNECOLOGY

## 2019-06-06 RX ORDER — ASPIRIN 81 MG/1
81 TABLET ORAL DAILY
COMMUNITY

## 2019-06-06 NOTE — PROGRESS NOTES
"  Subjective:       Patient ID: Lucy Staples is a 73 y.o. female.    Chief Complaint:  Pessary Check      History of Present Illness  HPI  Presents for pessary check.  Wears a 2 3/4" ring pessary with support for a symptomatic rectocele.  Doing great with her pessary in place.  Sees Jerri Tidwell monthly for pessary checks.  Uses estrace cream twice weekly.  No complaints today.    GYN & OB History  No LMP recorded. Patient has had a hysterectomy.   Date of Last Pap: No result found    OB History    Para Term  AB Living   1 1 1     1   SAB TAB Ectopic Multiple Live Births           1      # Outcome Date GA Lbr Dez/2nd Weight Sex Delivery Anes PTL Lv   1 Term     F Vag-Spont   VIRAJ       Review of Systems  Review of Systems   Constitutional: Negative for fatigue and unexpected weight change.   Gastrointestinal: Negative for abdominal pain, constipation, diarrhea, nausea and vomiting.   Genitourinary: Negative for frequency, pelvic pain, urgency, vaginal bleeding, vaginal discharge, vaginal pain, urinary incontinence, vaginal dryness and vaginal odor.           Objective:    Physical Exam:   Constitutional: She is oriented to person, place, and time. She appears well-developed and well-nourished. No distress.             Abdominal: Soft. She exhibits no distension and no mass. There is no tenderness. There is no rebound and no guarding. Hernia confirmed negative in the right inguinal area and confirmed negative in the left inguinal area.     Genitourinary: Vagina normal. Pelvic exam was performed with patient supine. There is no rash, tenderness, lesion or injury on the right labia. There is no rash, tenderness, lesion or injury on the left labia. Uterus is absent. Right adnexum displays no mass, no tenderness and no fullness. Left adnexum displays no mass, no tenderness and no fullness. There is rectocele (Grade 3 with Valsalva) in the vagina. No erythema, tenderness, bleeding, cystocele or " unspecified prolapse of vaginal walls in the vagina. No foreign body in the vagina. No signs of injury around the vagina. No vaginal discharge found. Cervix exhibits absence.   Genitourinary Comments: Pessary removed and washed.  Vaginal mucosa inspected and appears healthy.  Pessary reinserted without difficulty               Neurological: She is alert and oriented to person, place, and time.     Psychiatric: She has a normal mood and affect.          Assessment:        1. Encounter for pessary maintenance    2. Rectocele                Plan:      Lucy was seen today for pessary check.    Diagnoses and all orders for this visit:    Encounter for pessary maintenance    Rectocele     Doing great with pessary.  Continue twice weekly estrace cream.  RTC 4 weeks for pessary check.

## 2019-08-08 ENCOUNTER — OFFICE VISIT (OUTPATIENT)
Dept: OBSTETRICS AND GYNECOLOGY | Facility: CLINIC | Age: 74
End: 2019-08-08
Payer: MEDICARE

## 2019-08-08 VITALS
HEIGHT: 65 IN | DIASTOLIC BLOOD PRESSURE: 78 MMHG | BODY MASS INDEX: 25.2 KG/M2 | SYSTOLIC BLOOD PRESSURE: 118 MMHG | WEIGHT: 151.25 LBS

## 2019-08-08 DIAGNOSIS — Z46.89 ENCOUNTER FOR PESSARY MAINTENANCE: Primary | ICD-10-CM

## 2019-08-08 PROCEDURE — 1101F PT FALLS ASSESS-DOCD LE1/YR: CPT | Mod: CPTII,S$GLB,, | Performed by: NURSE PRACTITIONER

## 2019-08-08 PROCEDURE — 99999 PR PBB SHADOW E&M-EST. PATIENT-LVL III: CPT | Mod: PBBFAC,,, | Performed by: NURSE PRACTITIONER

## 2019-08-08 PROCEDURE — 99213 OFFICE O/P EST LOW 20 MIN: CPT | Mod: S$GLB,,, | Performed by: NURSE PRACTITIONER

## 2019-08-08 PROCEDURE — 99999 PR PBB SHADOW E&M-EST. PATIENT-LVL III: ICD-10-PCS | Mod: PBBFAC,,, | Performed by: NURSE PRACTITIONER

## 2019-08-08 PROCEDURE — 99213 PR OFFICE/OUTPT VISIT, EST, LEVL III, 20-29 MIN: ICD-10-PCS | Mod: S$GLB,,, | Performed by: NURSE PRACTITIONER

## 2019-08-08 PROCEDURE — 1101F PR PT FALLS ASSESS DOC 0-1 FALLS W/OUT INJ PAST YR: ICD-10-PCS | Mod: CPTII,S$GLB,, | Performed by: NURSE PRACTITIONER

## 2019-08-08 RX ORDER — CYCLOSPORINE 0.5 MG/ML
EMULSION OPHTHALMIC
Refills: 3 | COMMUNITY
Start: 2019-06-12 | End: 2020-01-16

## 2019-08-08 NOTE — PROGRESS NOTES
"Patient ID: Lucy Staples is a 73 y.o. female.     Chief Complaint:  Pessary Check        History of Present Illness  HPI  Presents for pessary check.  Wears a 2 3/4" ring pessary with support for a symptomatic rectocele.  Doing great with her pessary in place.  Sees us for monthly for pessary checks.  Uses estrace cream twice weekly.  No complaints today.     GYN & OB History  No LMP recorded. Patient has had a hysterectomy.   Date of Last Pap: No result found    Review of Systems  Review of Systems   Constitutional: Negative for fatigue and unexpected weight change.   Gastrointestinal: Negative for abdominal pain, constipation, diarrhea, nausea and vomiting.   Genitourinary: Negative for frequency, pelvic pain, urgency, vaginal bleeding, vaginal discharge, vaginal pain, urinary incontinence, vaginal dryness and vaginal odor.         Objective:    Physical Exam:   Constitutional: She is oriented to person, place, and time. She appears well-developed and well-nourished. No distress.              Abdominal: Soft. She exhibits no distension and no mass. There is no tenderness. There is no rebound and no guarding. Hernia confirmed negative in the right inguinal area and confirmed negative in the left inguinal area.     Genitourinary: Vagina normal. Pelvic exam was performed with patient supine. There is no rash, tenderness, lesion or injury on the right labia. There is no rash, tenderness, lesion or injury on the left labia. Uterus is absent. Right adnexum displays no mass, no tenderness and no fullness. Left adnexum displays no mass, no tenderness and no fullness. There is rectocele (Grade 3 with Valsalva) in the vagina. No erythema, tenderness, bleeding, cystocele or unspecified prolapse of vaginal walls in the vagina. No foreign body in the vagina. No signs of injury around the vagina. No vaginal discharge found. Cervix exhibits absence.   Genitourinary Comments: Pessary removed and washed.  Vaginal " mucosa inspected and appears healthy.  Pessary reinserted without difficulty               Neurological: She is alert and oriented to person, place, and time.     Psychiatric: She has a normal mood and affect.          Assessment:      1. Encounter for pessary maintenance    2. Rectocele             Plan:   Lucy was seen today for pessary check.     Diagnoses and all orders for this visit:     Encounter for pessary maintenance     Rectocele      Doing great with pessary.  Continue twice weekly estrace cream.  RTC 4 weeks for pessary check.

## 2019-09-05 ENCOUNTER — OFFICE VISIT (OUTPATIENT)
Dept: OBSTETRICS AND GYNECOLOGY | Facility: CLINIC | Age: 74
End: 2019-09-05
Payer: MEDICARE

## 2019-09-05 VITALS
DIASTOLIC BLOOD PRESSURE: 80 MMHG | WEIGHT: 118.38 LBS | SYSTOLIC BLOOD PRESSURE: 122 MMHG | BODY MASS INDEX: 19.72 KG/M2 | HEIGHT: 65 IN

## 2019-09-05 DIAGNOSIS — Z46.89 ENCOUNTER FOR PESSARY MAINTENANCE: Primary | ICD-10-CM

## 2019-09-05 DIAGNOSIS — N81.4 UTERINE PROLAPSE: ICD-10-CM

## 2019-09-05 DIAGNOSIS — N81.6 RECTOCELE: ICD-10-CM

## 2019-09-05 PROCEDURE — 99213 PR OFFICE/OUTPT VISIT, EST, LEVL III, 20-29 MIN: ICD-10-PCS | Mod: S$GLB,,, | Performed by: NURSE PRACTITIONER

## 2019-09-05 PROCEDURE — 99999 PR PBB SHADOW E&M-EST. PATIENT-LVL III: CPT | Mod: PBBFAC,,, | Performed by: NURSE PRACTITIONER

## 2019-09-05 PROCEDURE — 1101F PT FALLS ASSESS-DOCD LE1/YR: CPT | Mod: CPTII,S$GLB,, | Performed by: NURSE PRACTITIONER

## 2019-09-05 PROCEDURE — 1101F PR PT FALLS ASSESS DOC 0-1 FALLS W/OUT INJ PAST YR: ICD-10-PCS | Mod: CPTII,S$GLB,, | Performed by: NURSE PRACTITIONER

## 2019-09-05 PROCEDURE — 99213 OFFICE O/P EST LOW 20 MIN: CPT | Mod: S$GLB,,, | Performed by: NURSE PRACTITIONER

## 2019-09-05 PROCEDURE — 99999 PR PBB SHADOW E&M-EST. PATIENT-LVL III: ICD-10-PCS | Mod: PBBFAC,,, | Performed by: NURSE PRACTITIONER

## 2019-09-05 NOTE — PROGRESS NOTES
"History of Present Illness  HPI  Presents for pessary check.  Wears a 2 3/4" ring pessary with support for a symptomatic rectocele.  Doing great with her pessary in place.  Sees us for monthly for pessary checks.  Uses estrace cream twice weekly.  No complaints today.     GYN & OB History  No LMP recorded. Patient has had a hysterectomy.   Date of Last Pap: No result found     Review of Systems  Review of Systems   Constitutional: Negative for fatigue and unexpected weight change.   Gastrointestinal: Negative for abdominal pain, constipation, diarrhea, nausea and vomiting.   Genitourinary: Negative for frequency, pelvic pain, urgency, vaginal bleeding, vaginal discharge, vaginal pain, urinary incontinence, vaginal dryness and vaginal odor.         Objective:    Physical Exam:   Constitutional: She is oriented to person, place, and time. She appears well-developed and well-nourished. No distress.              Abdominal: Soft. She exhibits no distension and no mass. There is no tenderness. There is no rebound and no guarding. Hernia confirmed negative in the right inguinal area and confirmed negative in the left inguinal area.     Genitourinary: Vagina normal. Pelvic exam was performed with patient supine. There is no rash, tenderness, lesion or injury on the right labia. There is no rash, tenderness, lesion or injury on the left labia. Uterus is absent. Right adnexum displays no mass, no tenderness and no fullness. Left adnexum displays no mass, no tenderness and no fullness. There is rectocele (Grade 3 with Valsalva) in the vagina. No erythema, tenderness, bleeding, cystocele or unspecified prolapse of vaginal walls in the vagina. No foreign body in the vagina. No signs of injury around the vagina. No vaginal discharge found. Cervix exhibits absence.   Genitourinary Comments: Pessary removed and washed.  Vaginal mucosa inspected and appears healthy.  Pessary reinserted without difficulty               Neurological: " She is alert and oriented to person, place, and time.     Psychiatric: She has a normal mood and affect.         Assessment:      1. Encounter for pessary maintenance    2. Rectocele             Plan:   Lucy was seen today for pessary check.     Diagnoses and all orders for this visit:     Encounter for pessary maintenance     Rectocele      Doing great with pessary.  Continue twice weekly estrace cream.  RTC 4 weeks for pessary check.

## 2019-10-18 ENCOUNTER — OFFICE VISIT (OUTPATIENT)
Dept: OBSTETRICS AND GYNECOLOGY | Facility: CLINIC | Age: 74
End: 2019-10-18
Payer: MEDICARE

## 2019-10-18 VITALS
WEIGHT: 150.13 LBS | HEIGHT: 65 IN | DIASTOLIC BLOOD PRESSURE: 68 MMHG | SYSTOLIC BLOOD PRESSURE: 112 MMHG | BODY MASS INDEX: 25.01 KG/M2

## 2019-10-18 DIAGNOSIS — Z46.89 ENCOUNTER FOR PESSARY MAINTENANCE: Primary | ICD-10-CM

## 2019-10-18 PROCEDURE — 99999 PR PBB SHADOW E&M-EST. PATIENT-LVL III: CPT | Mod: PBBFAC,,, | Performed by: NURSE PRACTITIONER

## 2019-10-18 PROCEDURE — 1101F PR PT FALLS ASSESS DOC 0-1 FALLS W/OUT INJ PAST YR: ICD-10-PCS | Mod: CPTII,S$GLB,, | Performed by: NURSE PRACTITIONER

## 2019-10-18 PROCEDURE — 99999 PR PBB SHADOW E&M-EST. PATIENT-LVL III: ICD-10-PCS | Mod: PBBFAC,,, | Performed by: NURSE PRACTITIONER

## 2019-10-18 PROCEDURE — 99213 PR OFFICE/OUTPT VISIT, EST, LEVL III, 20-29 MIN: ICD-10-PCS | Mod: S$GLB,,, | Performed by: NURSE PRACTITIONER

## 2019-10-18 PROCEDURE — 1101F PT FALLS ASSESS-DOCD LE1/YR: CPT | Mod: CPTII,S$GLB,, | Performed by: NURSE PRACTITIONER

## 2019-10-18 PROCEDURE — 99213 OFFICE O/P EST LOW 20 MIN: CPT | Mod: S$GLB,,, | Performed by: NURSE PRACTITIONER

## 2020-01-16 ENCOUNTER — OFFICE VISIT (OUTPATIENT)
Dept: OBSTETRICS AND GYNECOLOGY | Facility: CLINIC | Age: 75
End: 2020-01-16
Payer: MEDICARE

## 2020-01-16 VITALS
WEIGHT: 152.31 LBS | BODY MASS INDEX: 25.38 KG/M2 | SYSTOLIC BLOOD PRESSURE: 152 MMHG | HEIGHT: 65 IN | DIASTOLIC BLOOD PRESSURE: 78 MMHG

## 2020-01-16 DIAGNOSIS — Z46.89 ENCOUNTER FOR PESSARY MAINTENANCE: Primary | ICD-10-CM

## 2020-01-16 PROCEDURE — 1159F PR MEDICATION LIST DOCUMENTED IN MEDICAL RECORD: ICD-10-PCS | Mod: S$GLB,,, | Performed by: NURSE PRACTITIONER

## 2020-01-16 PROCEDURE — 99999 PR PBB SHADOW E&M-EST. PATIENT-LVL III: ICD-10-PCS | Mod: PBBFAC,,, | Performed by: NURSE PRACTITIONER

## 2020-01-16 PROCEDURE — 1126F PR PAIN SEVERITY QUANTIFIED, NO PAIN PRESENT: ICD-10-PCS | Mod: S$GLB,,, | Performed by: NURSE PRACTITIONER

## 2020-01-16 PROCEDURE — 1126F AMNT PAIN NOTED NONE PRSNT: CPT | Mod: S$GLB,,, | Performed by: NURSE PRACTITIONER

## 2020-01-16 PROCEDURE — 99999 PR PBB SHADOW E&M-EST. PATIENT-LVL III: CPT | Mod: PBBFAC,,, | Performed by: NURSE PRACTITIONER

## 2020-01-16 PROCEDURE — 1159F MED LIST DOCD IN RCRD: CPT | Mod: S$GLB,,, | Performed by: NURSE PRACTITIONER

## 2020-01-16 PROCEDURE — 99213 OFFICE O/P EST LOW 20 MIN: CPT | Mod: S$GLB,,, | Performed by: NURSE PRACTITIONER

## 2020-01-16 PROCEDURE — 1101F PT FALLS ASSESS-DOCD LE1/YR: CPT | Mod: CPTII,S$GLB,, | Performed by: NURSE PRACTITIONER

## 2020-01-16 PROCEDURE — 1101F PR PT FALLS ASSESS DOC 0-1 FALLS W/OUT INJ PAST YR: ICD-10-PCS | Mod: CPTII,S$GLB,, | Performed by: NURSE PRACTITIONER

## 2020-01-16 PROCEDURE — 99213 PR OFFICE/OUTPT VISIT, EST, LEVL III, 20-29 MIN: ICD-10-PCS | Mod: S$GLB,,, | Performed by: NURSE PRACTITIONER

## 2020-01-16 NOTE — PROGRESS NOTES
PROCEDURE:   TIME OUT PERFORMED.  The pessary was removed and cleaned with soap and water.  PROCEDURE:  TIME OUT PERFORMED.  On exam there were no vaginal lesions, no significant discharge, no vaginal bleeding or ulcerations.  The patient was asked to empty her bladder to make the fitting more comfortable.  The prolapse was easily reducible. The pessary was placed per vagina without difficulty.  The patient was able to sit, stand, walk and either cough or urinate on the toilet after the procedure without expelling the pessary. The patient tolerated the procedure well.  She experienced no discomfort.    POST PESSARY COUNSELING:  Report worsening urinary incontinency, urinary retention, pain, fever, foul smelling discharge or bleeding.  Self care: pessary removal, cleaning with soap and water, drying, leaving out overnight, recommended weekly.  Use and potential side effects of vaginal hormone cream prescribed per [employer].  May douche with 1 part vinegar and 2 parts water for discharge and odor.  Due to inability to perform self care, importance of keeping follow-up appointments for a pessary check was stressed.  Counseling lasted approximately 15 minutes and all her questions were answered.    RTC in 4 weeks

## 2021-03-05 ENCOUNTER — IMMUNIZATION (OUTPATIENT)
Dept: INTERNAL MEDICINE | Facility: CLINIC | Age: 76
End: 2021-03-05
Payer: MEDICARE

## 2021-03-05 DIAGNOSIS — Z23 NEED FOR VACCINATION: Primary | ICD-10-CM

## 2021-03-05 PROCEDURE — 91300 COVID-19, MRNA, LNP-S, PF, 30 MCG/0.3 ML DOSE VACCINE: CPT | Mod: PBBFAC | Performed by: FAMILY MEDICINE

## 2021-03-26 ENCOUNTER — IMMUNIZATION (OUTPATIENT)
Dept: INTERNAL MEDICINE | Facility: CLINIC | Age: 76
End: 2021-03-26
Payer: MEDICARE

## 2021-03-26 DIAGNOSIS — Z23 NEED FOR VACCINATION: Primary | ICD-10-CM

## 2021-03-26 PROCEDURE — 91300 COVID-19, MRNA, LNP-S, PF, 30 MCG/0.3 ML DOSE VACCINE: CPT | Mod: PBBFAC | Performed by: FAMILY MEDICINE

## 2021-03-26 PROCEDURE — 0002A COVID-19, MRNA, LNP-S, PF, 30 MCG/0.3 ML DOSE VACCINE: CPT | Mod: PBBFAC | Performed by: FAMILY MEDICINE

## 2021-07-29 ENCOUNTER — PATIENT MESSAGE (OUTPATIENT)
Dept: UROLOGY | Facility: CLINIC | Age: 76
End: 2021-07-29

## 2021-10-06 ENCOUNTER — IMMUNIZATION (OUTPATIENT)
Dept: PRIMARY CARE CLINIC | Facility: CLINIC | Age: 76
End: 2021-10-06
Payer: MEDICARE

## 2021-10-06 DIAGNOSIS — Z23 NEED FOR VACCINATION: Primary | ICD-10-CM

## 2021-10-06 PROCEDURE — 91300 COVID-19, MRNA, LNP-S, PF, 30 MCG/0.3 ML DOSE VACCINE: CPT | Mod: PBBFAC | Performed by: FAMILY MEDICINE

## 2021-10-06 PROCEDURE — 0003A COVID-19, MRNA, LNP-S, PF, 30 MCG/0.3 ML DOSE VACCINE: CPT | Mod: CV19,PBBFAC | Performed by: FAMILY MEDICINE

## 2024-12-24 ENCOUNTER — HOSPITAL ENCOUNTER (OUTPATIENT)
Facility: HOSPITAL | Age: 79
Discharge: NURSING, REHAB OR LONG-TERM CARE | End: 2025-01-10
Attending: EMERGENCY MEDICINE | Admitting: STUDENT IN AN ORGANIZED HEALTH CARE EDUCATION/TRAINING PROGRAM
Payer: MEDICARE

## 2024-12-24 DIAGNOSIS — N30.00 ACUTE CYSTITIS WITHOUT HEMATURIA: ICD-10-CM

## 2024-12-24 DIAGNOSIS — I10 HYPERTENSION, UNSPECIFIED TYPE: ICD-10-CM

## 2024-12-24 DIAGNOSIS — R41.89 IMPAIRED COGNITION: Primary | ICD-10-CM

## 2024-12-24 DIAGNOSIS — R07.9 CHEST PAIN: ICD-10-CM

## 2024-12-24 DIAGNOSIS — R41.82 AMS (ALTERED MENTAL STATUS): ICD-10-CM

## 2024-12-24 PROBLEM — N39.0 UTI (URINARY TRACT INFECTION): Status: ACTIVE | Noted: 2024-12-24

## 2024-12-24 PROBLEM — F03.90 DEMENTIA: Status: ACTIVE | Noted: 2024-12-24

## 2024-12-24 LAB
ALBUMIN SERPL BCP-MCNC: 4.4 G/DL (ref 3.5–5.2)
ALP SERPL-CCNC: 56 U/L (ref 40–150)
ALT SERPL W/O P-5'-P-CCNC: 22 U/L (ref 10–44)
AMMONIA PLAS-SCNC: 24 UMOL/L (ref 10–50)
ANION GAP SERPL CALC-SCNC: 12 MMOL/L (ref 8–16)
AST SERPL-CCNC: 30 U/L (ref 10–40)
BACTERIA #/AREA URNS HPF: ABNORMAL /HPF
BASOPHILS # BLD AUTO: 0.07 K/UL (ref 0–0.2)
BASOPHILS NFR BLD: 1.1 % (ref 0–1.9)
BILIRUB SERPL-MCNC: 0.8 MG/DL (ref 0.1–1)
BILIRUB UR QL STRIP: NEGATIVE
BNP SERPL-MCNC: 56 PG/ML (ref 0–99)
BUN SERPL-MCNC: 17 MG/DL (ref 8–23)
CALCIUM SERPL-MCNC: 10 MG/DL (ref 8.7–10.5)
CHLORIDE SERPL-SCNC: 105 MMOL/L (ref 95–110)
CK SERPL-CCNC: 141 U/L (ref 20–180)
CLARITY UR: CLEAR
CO2 SERPL-SCNC: 23 MMOL/L (ref 23–29)
COLOR UR: COLORLESS
CREAT SERPL-MCNC: 0.8 MG/DL (ref 0.5–1.4)
DIFFERENTIAL METHOD BLD: NORMAL
EOSINOPHIL # BLD AUTO: 0 K/UL (ref 0–0.5)
EOSINOPHIL NFR BLD: 0.3 % (ref 0–8)
ERYTHROCYTE [DISTWIDTH] IN BLOOD BY AUTOMATED COUNT: 12.6 % (ref 11.5–14.5)
EST. GFR  (NO RACE VARIABLE): >60 ML/MIN/1.73 M^2
GLUCOSE SERPL-MCNC: 96 MG/DL (ref 70–110)
GLUCOSE UR QL STRIP: NEGATIVE
HCT VFR BLD AUTO: 39.4 % (ref 37–48.5)
HCV AB SERPL QL IA: NEGATIVE
HEP C VIRUS HOLD SPECIMEN: NORMAL
HGB BLD-MCNC: 12.9 G/DL (ref 12–16)
HGB UR QL STRIP: ABNORMAL
HIV 1+2 AB+HIV1 P24 AG SERPL QL IA: NEGATIVE
IMM GRANULOCYTES # BLD AUTO: 0.02 K/UL (ref 0–0.04)
IMM GRANULOCYTES NFR BLD AUTO: 0.3 % (ref 0–0.5)
KETONES UR QL STRIP: NEGATIVE
LEUKOCYTE ESTERASE UR QL STRIP: ABNORMAL
LYMPHOCYTES # BLD AUTO: 1.4 K/UL (ref 1–4.8)
LYMPHOCYTES NFR BLD: 21.6 % (ref 18–48)
MCH RBC QN AUTO: 30.5 PG (ref 27–31)
MCHC RBC AUTO-ENTMCNC: 32.7 G/DL (ref 32–36)
MCV RBC AUTO: 93 FL (ref 82–98)
MICROSCOPIC COMMENT: ABNORMAL
MONOCYTES # BLD AUTO: 0.5 K/UL (ref 0.3–1)
MONOCYTES NFR BLD: 7.1 % (ref 4–15)
NEUTROPHILS # BLD AUTO: 4.4 K/UL (ref 1.8–7.7)
NEUTROPHILS NFR BLD: 69.6 % (ref 38–73)
NITRITE UR QL STRIP: NEGATIVE
NRBC BLD-RTO: 0 /100 WBC
PH UR STRIP: 7 [PH] (ref 5–8)
PLATELET # BLD AUTO: 235 K/UL (ref 150–450)
PMV BLD AUTO: 10.1 FL (ref 9.2–12.9)
POTASSIUM SERPL-SCNC: 4 MMOL/L (ref 3.5–5.1)
PROT SERPL-MCNC: 7.8 G/DL (ref 6–8.4)
PROT UR QL STRIP: NEGATIVE
RBC # BLD AUTO: 4.23 M/UL (ref 4–5.4)
RBC #/AREA URNS HPF: 1 /HPF (ref 0–4)
SODIUM SERPL-SCNC: 140 MMOL/L (ref 136–145)
SP GR UR STRIP: <1.005 (ref 1–1.03)
SQUAMOUS #/AREA URNS HPF: 3 /HPF
TROPONIN I SERPL DL<=0.01 NG/ML-MCNC: 0.02 NG/ML (ref 0–0.03)
TSH SERPL DL<=0.005 MIU/L-ACNC: 0.52 UIU/ML (ref 0.4–4)
URN SPEC COLLECT METH UR: ABNORMAL
UROBILINOGEN UR STRIP-ACNC: NEGATIVE EU/DL
WBC # BLD AUTO: 6.34 K/UL (ref 3.9–12.7)
WBC #/AREA URNS HPF: 11 /HPF (ref 0–5)

## 2024-12-24 PROCEDURE — G0378 HOSPITAL OBSERVATION PER HR: HCPCS

## 2024-12-24 PROCEDURE — 86803 HEPATITIS C AB TEST: CPT | Performed by: EMERGENCY MEDICINE

## 2024-12-24 PROCEDURE — 87086 URINE CULTURE/COLONY COUNT: CPT | Performed by: EMERGENCY MEDICINE

## 2024-12-24 PROCEDURE — 81000 URINALYSIS NONAUTO W/SCOPE: CPT | Performed by: EMERGENCY MEDICINE

## 2024-12-24 PROCEDURE — 82550 ASSAY OF CK (CPK): CPT | Performed by: EMERGENCY MEDICINE

## 2024-12-24 PROCEDURE — 96372 THER/PROPH/DIAG INJ SC/IM: CPT | Performed by: STUDENT IN AN ORGANIZED HEALTH CARE EDUCATION/TRAINING PROGRAM

## 2024-12-24 PROCEDURE — 93005 ELECTROCARDIOGRAM TRACING: CPT

## 2024-12-24 PROCEDURE — 93010 ELECTROCARDIOGRAM REPORT: CPT | Mod: ,,, | Performed by: INTERNAL MEDICINE

## 2024-12-24 PROCEDURE — 84484 ASSAY OF TROPONIN QUANT: CPT | Performed by: EMERGENCY MEDICINE

## 2024-12-24 PROCEDURE — 96365 THER/PROPH/DIAG IV INF INIT: CPT

## 2024-12-24 PROCEDURE — 87389 HIV-1 AG W/HIV-1&-2 AB AG IA: CPT | Performed by: EMERGENCY MEDICINE

## 2024-12-24 PROCEDURE — 63600175 PHARM REV CODE 636 W HCPCS: Performed by: STUDENT IN AN ORGANIZED HEALTH CARE EDUCATION/TRAINING PROGRAM

## 2024-12-24 PROCEDURE — 82140 ASSAY OF AMMONIA: CPT | Performed by: EMERGENCY MEDICINE

## 2024-12-24 PROCEDURE — 83880 ASSAY OF NATRIURETIC PEPTIDE: CPT | Performed by: EMERGENCY MEDICINE

## 2024-12-24 PROCEDURE — 99285 EMERGENCY DEPT VISIT HI MDM: CPT | Mod: 25

## 2024-12-24 PROCEDURE — 84443 ASSAY THYROID STIM HORMONE: CPT | Performed by: EMERGENCY MEDICINE

## 2024-12-24 PROCEDURE — 63600175 PHARM REV CODE 636 W HCPCS: Performed by: EMERGENCY MEDICINE

## 2024-12-24 PROCEDURE — 85025 COMPLETE CBC W/AUTO DIFF WBC: CPT | Performed by: EMERGENCY MEDICINE

## 2024-12-24 PROCEDURE — 80053 COMPREHEN METABOLIC PANEL: CPT | Performed by: EMERGENCY MEDICINE

## 2024-12-24 RX ORDER — IBUPROFEN 200 MG
24 TABLET ORAL
Status: DISCONTINUED | OUTPATIENT
Start: 2024-12-24 | End: 2025-01-10 | Stop reason: HOSPADM

## 2024-12-24 RX ORDER — IBUPROFEN 200 MG
16 TABLET ORAL
Status: DISCONTINUED | OUTPATIENT
Start: 2024-12-24 | End: 2025-01-10 | Stop reason: HOSPADM

## 2024-12-24 RX ORDER — ENOXAPARIN SODIUM 100 MG/ML
40 INJECTION SUBCUTANEOUS EVERY 24 HOURS
Status: DISCONTINUED | OUTPATIENT
Start: 2024-12-24 | End: 2024-12-30

## 2024-12-24 RX ORDER — GLUCAGON 1 MG
1 KIT INJECTION
Status: DISCONTINUED | OUTPATIENT
Start: 2024-12-24 | End: 2025-01-10 | Stop reason: HOSPADM

## 2024-12-24 RX ORDER — NALOXONE HCL 0.4 MG/ML
0.02 VIAL (ML) INJECTION
Status: DISCONTINUED | OUTPATIENT
Start: 2024-12-24 | End: 2025-01-10 | Stop reason: HOSPADM

## 2024-12-24 RX ORDER — CIPROFLOXACIN 2 MG/ML
400 INJECTION, SOLUTION INTRAVENOUS
Status: DISCONTINUED | OUTPATIENT
Start: 2024-12-24 | End: 2024-12-25

## 2024-12-24 RX ORDER — ASPIRIN 81 MG/1
81 TABLET ORAL DAILY
Status: DISCONTINUED | OUTPATIENT
Start: 2024-12-25 | End: 2025-01-10 | Stop reason: HOSPADM

## 2024-12-24 RX ORDER — SODIUM CHLORIDE 0.9 % (FLUSH) 0.9 %
10 SYRINGE (ML) INJECTION EVERY 12 HOURS PRN
Status: DISCONTINUED | OUTPATIENT
Start: 2024-12-24 | End: 2025-01-10 | Stop reason: HOSPADM

## 2024-12-24 RX ORDER — BISOPROLOL FUMARATE AND HYDROCHLOROTHIAZIDE 2.5; 6.25 MG/1; MG/1
1 TABLET ORAL DAILY
Status: DISCONTINUED | OUTPATIENT
Start: 2024-12-25 | End: 2025-01-10 | Stop reason: HOSPADM

## 2024-12-24 RX ADMIN — CIPROFLOXACIN 400 MG: 2 INJECTION, SOLUTION INTRAVENOUS at 05:12

## 2024-12-24 NOTE — HPI
This is an 79-year-old woman who lives alone with hypertension, hyperlipidemia who was confused and called the police department, ultimately ended up in the hospital and was found to have evidence of a UTI     Patient is accompanied by her friend from [facility], Dominick Loco ([phone]) who explains patient has a good support network with friends and neighbors who help to take care of her at home by checking on her intermittently.  It is unclear what happened today, the patient does not remember, but per the ED notes the patient could not locate her keys and called the police department who then called for an ambulance.  The patient is asymptomatic at this time and of note denies dysuria or urinary frequency.  She states that she still drives and then explained that her license has .      It sounds like the home situation may not be tenable anymore as the patient likely has undiagnosed dementia and difficulty caring for herself.  Does have a PCP but does not see her regularly.  On the day of appointments she gets worried and then does not go.  Been and her friends have been trying to get her to see the doctor unsuccessfully.      In the ED, vitals notable for some hypertension with blood pressure is 160/70s.  CBC and CMP were normal.  Urinalysis with 11 WBCs per high-power field and some leukocyte esterase.  She was diagnosed with a urinary tract infection and referred for admission.  She was started on ciprofloxacin

## 2024-12-24 NOTE — ED PROVIDER NOTES
"SCRIBE #1 NOTE: I, Esvin Blanca, am scribing for, and in the presence of, Adolph Smiley MD. I have scribed the entire note.       History     Chief Complaint   Patient presents with    Altered Mental Status     Per EMS patient contacted PD, because she could not locate her keys. PD realized she is confused and called for an ambulance. Pt denies any pain, or SOB.      Review of patient's allergies indicates:   Allergen Reactions    Pcn [penicillins] Hives         History of Present Illness     HPI    12/24/2024, 12:30 PM  History obtained from the patient      History of Present Illness: Lucy Staples is a 79 y.o. female patient who presents to the Emergency Department for evaluation of AMS. On exam pt does not know who brought her to the ER, states she lives by herself, and when asked where she lives, she stated "in [place]." No further complaints or concerns at this time.       Arrival mode: EMS    PCP: Lamin Webber MD        Past Medical History:  Past Medical History:   Diagnosis Date    History of oral surgery     Hyperlipidemia     Hypertension     IBS (irritable bowel syndrome)     Rectocele     Recurrent UTI        Past Surgical History:  Past Surgical History:   Procedure Laterality Date    HYSTERECTOMY      SINUS SURGERY           Family History:  Family History   Problem Relation Name Age of Onset    Breast cancer Neg Hx      Colon cancer Neg Hx      Ovarian cancer Neg Hx         Social History:  Social History     Tobacco Use    Smoking status: Never    Smokeless tobacco: Never   Substance and Sexual Activity    Alcohol use: No    Drug use: No    Sexual activity: Not Currently        Review of Systems     Review of Systems   Unable to perform ROS: Mental status change        Physical Exam     Initial Vitals   BP Pulse Resp Temp SpO2   12/24/24 1154 12/24/24 1154 12/24/24 1154 12/24/24 1154 12/24/24 1347   (!) 157/87 78 16 97.8 °F (36.6 °C) 100 %      MAP       --               " "  Physical Exam  Nursing Notes and Vital Signs Reviewed.  Constitutional: Patient is in no acute distress. Well-developed and well-nourished.  Head: Atraumatic. Normocephalic.  Eyes: EOM intact. Conjunctivae are not pale. No scleral icterus.  ENT: Mucous membranes are moist.   Neck: Supple. Full ROM.   Cardiovascular: Regular rate. Regular rhythm. No murmurs, rubs, or gallops.   Pulmonary/Chest: No respiratory distress. Clear to auscultation bilaterally. No wheezing or rales.  Abdominal: Soft and non-distended.  There is no tenderness.  No rebound, guarding, or rigidity.   Musculoskeletal: Moves all extremities. No obvious deformities. No edema.   Skin: Warm and dry.  Neurological:  Alert, awake, and appropriate.  Normal speech.  No acute focal neurological deficits are appreciated.  Psychiatric: Confused. Good eye contact. Appropriate in content.        ED Course   Procedures  ED Vital Signs:  Vitals:    12/24/24 1154 12/24/24 1347 12/24/24 1447   BP: (!) 157/87 (!) 161/76 (!) 150/78   Pulse: 78 72 68   Resp: 16     Temp: 97.8 °F (36.6 °C)     TempSrc: Oral     SpO2:  100% 100%   Weight: 49.9 kg (110 lb)     Height: 5' 4" (1.626 m)         Abnormal Lab Results:  Labs Reviewed   URINALYSIS, REFLEX TO URINE CULTURE - Abnormal       Result Value    Specimen UA Urine, Clean Catch      Color, UA Colorless (*)     Appearance, UA Clear      pH, UA 7.0      Specific Gravity, UA <1.005 (*)     Protein, UA Negative      Glucose, UA Negative      Ketones, UA Negative      Bilirubin (UA) Negative      Occult Blood UA Trace (*)     Nitrite, UA Negative      Urobilinogen, UA Negative      Leukocytes, UA 3+ (*)     Narrative:     Specimen Source->Urine   URINALYSIS MICROSCOPIC - Abnormal    RBC, UA 1      WBC, UA 11 (*)     Bacteria Occasional      Squam Epithel, UA 3      Microscopic Comment SEE COMMENT      Narrative:     Specimen Source->Urine   CULTURE, URINE   HEPATITIS C ANTIBODY    Hepatitis C Ab Negative      Narrative:  "    Release to patient->Immediate   HEP C VIRUS HOLD SPECIMEN    HEP C Virus Hold Specimen Hold for HCV sendout      Narrative:     Release to patient->Immediate   HIV 1 / 2 ANTIBODY    HIV 1/2 Ag/Ab Negative      Narrative:     Release to patient->Immediate   CBC W/ AUTO DIFFERENTIAL    WBC 6.34      RBC 4.23      Hemoglobin 12.9      Hematocrit 39.4      MCV 93      MCH 30.5      MCHC 32.7      RDW 12.6      Platelets 235      MPV 10.1      Immature Granulocytes 0.3      Gran # (ANC) 4.4      Immature Grans (Abs) 0.02      Lymph # 1.4      Mono # 0.5      Eos # 0.0      Baso # 0.07      nRBC 0      Gran % 69.6      Lymph % 21.6      Mono % 7.1      Eosinophil % 0.3      Basophil % 1.1      Differential Method Automated     COMPREHENSIVE METABOLIC PANEL    Sodium 140      Potassium 4.0      Chloride 105      CO2 23      Glucose 96      BUN 17      Creatinine 0.8      Calcium 10.0      Total Protein 7.8      Albumin 4.4      Total Bilirubin 0.8      Alkaline Phosphatase 56      AST 30      ALT 22      eGFR >60      Anion Gap 12     B-TYPE NATRIURETIC PEPTIDE    BNP 56     CK         TROPONIN I    Troponin I 0.016     TSH    TSH 0.519     AMMONIA    Ammonia 24          All Lab Results:  Results for orders placed or performed during the hospital encounter of 12/24/24   Hepatitis C Antibody    Collection Time: 12/24/24  1:06 PM   Result Value Ref Range    Hepatitis C Ab Negative Negative   HCV Virus Hold Specimen    Collection Time: 12/24/24  1:06 PM   Result Value Ref Range    HEP C Virus Hold Specimen Hold for HCV sendout    HIV 1/2 Ag/Ab (4th Gen)    Collection Time: 12/24/24  1:06 PM   Result Value Ref Range    HIV 1/2 Ag/Ab Negative Negative   CBC Auto Differential    Collection Time: 12/24/24  1:06 PM   Result Value Ref Range    WBC 6.34 3.90 - 12.70 K/uL    RBC 4.23 4.00 - 5.40 M/uL    Hemoglobin 12.9 12.0 - 16.0 g/dL    Hematocrit 39.4 37.0 - 48.5 %    MCV 93 82 - 98 fL    MCH 30.5 27.0 - 31.0 pg    MCHC  32.7 32.0 - 36.0 g/dL    RDW 12.6 11.5 - 14.5 %    Platelets 235 150 - 450 K/uL    MPV 10.1 9.2 - 12.9 fL    Immature Granulocytes 0.3 0.0 - 0.5 %    Gran # (ANC) 4.4 1.8 - 7.7 K/uL    Immature Grans (Abs) 0.02 0.00 - 0.04 K/uL    Lymph # 1.4 1.0 - 4.8 K/uL    Mono # 0.5 0.3 - 1.0 K/uL    Eos # 0.0 0.0 - 0.5 K/uL    Baso # 0.07 0.00 - 0.20 K/uL    nRBC 0 0 /100 WBC    Gran % 69.6 38.0 - 73.0 %    Lymph % 21.6 18.0 - 48.0 %    Mono % 7.1 4.0 - 15.0 %    Eosinophil % 0.3 0.0 - 8.0 %    Basophil % 1.1 0.0 - 1.9 %    Differential Method Automated    Comprehensive Metabolic Panel    Collection Time: 12/24/24  1:06 PM   Result Value Ref Range    Sodium 140 136 - 145 mmol/L    Potassium 4.0 3.5 - 5.1 mmol/L    Chloride 105 95 - 110 mmol/L    CO2 23 23 - 29 mmol/L    Glucose 96 70 - 110 mg/dL    BUN 17 8 - 23 mg/dL    Creatinine 0.8 0.5 - 1.4 mg/dL    Calcium 10.0 8.7 - 10.5 mg/dL    Total Protein 7.8 6.0 - 8.4 g/dL    Albumin 4.4 3.5 - 5.2 g/dL    Total Bilirubin 0.8 0.1 - 1.0 mg/dL    Alkaline Phosphatase 56 40 - 150 U/L    AST 30 10 - 40 U/L    ALT 22 10 - 44 U/L    eGFR >60 >60 mL/min/1.73 m^2    Anion Gap 12 8 - 16 mmol/L   BNP    Collection Time: 12/24/24  1:06 PM   Result Value Ref Range    BNP 56 0 - 99 pg/mL   CK    Collection Time: 12/24/24  1:06 PM   Result Value Ref Range     20 - 180 U/L   Troponin I    Collection Time: 12/24/24  1:06 PM   Result Value Ref Range    Troponin I 0.016 0.000 - 0.026 ng/mL   TSH    Collection Time: 12/24/24  1:06 PM   Result Value Ref Range    TSH 0.519 0.400 - 4.000 uIU/mL   Ammonia    Collection Time: 12/24/24  1:06 PM   Result Value Ref Range    Ammonia 24 10 - 50 umol/L   Urinalysis, Reflex to Urine Culture Urine, Clean Catch    Collection Time: 12/24/24  1:54 PM    Specimen: Urine   Result Value Ref Range    Specimen UA Urine, Clean Catch     Color, UA Colorless (A) Yellow, Straw, [place]    Appearance, UA Clear Clear    pH, UA 7.0 5.0 - 8.0    Specific Gravity, UA <1.005  (A) 1.005 - 1.030    Protein, UA Negative Negative    Glucose, UA Negative Negative    Ketones, UA Negative Negative    Bilirubin (UA) Negative Negative    Occult Blood UA Trace (A) Negative    Nitrite, UA Negative Negative    Urobilinogen, UA Negative <2.0 EU/dL    Leukocytes, UA 3+ (A) Negative   Urinalysis Microscopic    Collection Time: 12/24/24  1:54 PM   Result Value Ref Range    RBC, UA 1 0 - 4 /hpf    WBC, UA 11 (H) 0 - 5 /hpf    Bacteria Occasional None-Occ /hpf    Squam Epithel, UA 3 /hpf    Microscopic Comment SEE COMMENT          Imaging Results:  Imaging Results              X-Ray Chest AP Portable (Final result)  Result time 12/24/24 14:02:35      Final result by Thien Peoples MD (12/24/24 14:02:35)                   Impression:      No acute process seen.      Electronically signed by: Thien Peoples MD  Date:    12/24/2024  Time:    14:02               Narrative:    EXAMINATION:  XR CHEST AP PORTABLE    CLINICAL HISTORY:  ams;    FINDINGS:  Single view of the chest.  No comparison    Cardiac silhouette is normal.  COPD and emphysematous changes within the lungs.  The lungs demonstrate no evidence of active disease.  No evidence of pleural effusion or pneumothorax.  Bones appear intact.  Moderate degenerative changes and moderate atherosclerotic disease.                                       CT Head Without Contrast (Final result)  Result time 12/24/24 13:34:37      Final result by Thien Peoples MD (12/24/24 13:34:37)                   Impression:      Chronic microvascular ischemic changes.      Electronically signed by: Thien Peoples MD  Date:    12/24/2024  Time:    13:34               Narrative:    EXAMINATION:  CT HEAD WITHOUT CONTRAST    CLINICAL HISTORY:  Mental status change, unknown cause;    TECHNIQUE:  Low dose axial CT images obtained throughout the head without intravenous contrast. Sagittal and coronal reconstructions were performed.  All CT scans at this facility use dose  modulation, iterative reconstruction and/or weight based dosing when appropriate to reduce radiation dose to as low as reasonably achievable.    COMPARISON:  None.    FINDINGS:  Intracranial compartment:    The brain parenchyma demonstrates areas of decreased attenuation with moderate periventricular white matter consistent with chronic microvascular ischemic changes..  No parenchymal mass, hemorrhage, edema or major vascular distribution infarct.  Vascular calcifications are noted.    Mild prominence of the sulci and ventricles are consistent with age-related involutional changes.    No extra-axial blood or fluid collections.    Skull/extracranial contents (limited evaluation): No fracture. Mastoid air cells and paranasal sinuses are essentially clear.                                       The EKG was ordered, reviewed, and independently interpreted by the ED provider.  Interpretation time: 1258  Rate: 70 BPM  Rhythm: normal sinus rhythm  Interpretation: Septal infarct, age undetermined. No STEMI.           The Emergency Provider reviewed the vital signs and test results, which are outlined above.     ED Discussion     2:49 PM: Discussed case with Esther Pelayo NP ([facility]). Dr. Landry agrees with current care and management of pt and accepts admission.   Admitting Service:   Admitting Physician: Dr. Landry  Admit to: obs [facility] tele    2:50 PM: Re-evaluated pt. I have discussed test results, shared treatment plan, and the need for admission with patient. Pt expresses understanding at this time and agree with all information. All questions answered. Pt has no further questions or concerns at this time. Pt is ready for admit.           Medical Decision Making  Differential diagnoses: CVA, TIA, electrolyte abnormality, sepsis, polypharmacy, withdrawal state, hyperglycemia, hypoglycemia, hypoxia, CNS encphalitis intracranial hemorrhage or subdural, medication side effect, infection such as pneumonia  COVID influenza or UTI      Amount and/or Complexity of Data Reviewed  Labs: ordered. Decision-making details documented in ED Course.  Radiology: ordered. Decision-making details documented in ED Course.  ECG/medicine tests: ordered and independent interpretation performed. Decision-making details documented in ED Course.    Risk  Prescription drug management.  Decision regarding hospitalization.                ED Medication(s):  Medications   ciprofloxacin (CIPRO)400mg/200ml D5W IVPB 400 mg (has no administration in time range)       New Prescriptions    No medications on file               Scribe Attestation:   Scribe #1: I performed the above scribed service and the documentation accurately describes the services I performed. I attest to the accuracy of the note.     Attending:   Physician Attestation Statement for Scribe #1: I, Adolph Smiley MD, personally performed the services described in this documentation, as scribed by Esvin Rios, in my presence, and it is both accurate and complete.           Clinical Impression       ICD-10-CM ICD-9-CM   1. AMS (altered mental status)  R41.82 780.97   2. Acute cystitis without hematuria  N30.00 595.0       Disposition:   Disposition: Placed in Observation  Condition: Adolph Dennis MD  12/24/24 0643

## 2024-12-24 NOTE — PLAN OF CARE
Problem: Adult Inpatient Plan of Care  Goal: Plan of Care Review  Outcome: Progressing  Goal: Patient-Specific Goal (Individualized)  Outcome: Progressing  Goal: Absence of Hospital-Acquired Illness or Injury  Outcome: Progressing  Goal: Optimal Comfort and Wellbeing  Outcome: Progressing  Goal: Readiness for Transition of Care  Outcome: Progressing     Problem: Fall Injury Risk  Goal: Absence of Fall and Fall-Related Injury  Outcome: Progressing     Problem: UTI (Urinary Tract Infection)  Goal: Improved Infection Symptoms  Outcome: Progressing     Problem: Confusion Acute  Goal: Optimal Cognitive Function  Outcome: Progressing

## 2024-12-24 NOTE — PROGRESS NOTES
[facility] Medicine  Progress Note    Patient Name: Lucy Staples  MRN: [id number]  Patient Class: OP- Observation   Admission Date: 2024  Length of Stay: 0 days  Attending Physician: Asael Landry MD  Primary Care Provider: Lamin Webber MD        Subjective     Principal Problem:UTI (urinary tract infection)        HPI:  This is an 79-year-old woman who lives alone with hypertension, hyperlipidemia who was confused and called the police department, ultimately ended up in the hospital and was found to have evidence of a UTI     Patient is accompanied by her friend from [facility], Dominick Loco ([phone]) who explains patient has a good support network with friends and neighbors who help to take care of her at home by checking on her intermittently.  It is unclear what happened today, the patient does not remember, but per the ED notes the patient could not locate her keys and called the police department who then called for an ambulance.  The patient is asymptomatic at this time and of note denies dysuria or urinary frequency.  She states that she still drives and then explained that her license has .      It sounds like the home situation may not be tenable anymore as the patient likely has undiagnosed dementia and difficulty caring for herself.  Does have a PCP but does not see her regularly.  On the day of appointments she gets worried and then does not go.  Been and her friends have been trying to get her to see the doctor unsuccessfully.      In the ED, vitals notable for some hypertension with blood pressure is 160/70s.  CBC and CMP were normal.  Urinalysis with 11 WBCs per high-power field and some leukocyte esterase.  She was diagnosed with a urinary tract infection and referred for admission.  She was started on ciprofloxacin    Overview/Hospital Course:  No notes on file        Review of Systems   Constitutional:  Negative for fatigue and fever.   HENT:  Please see ultrasound report under Imaging tab or Media tab.   Negative.     Respiratory:  Negative for chest tightness and shortness of breath.    Cardiovascular:  Negative for chest pain and leg swelling.   Gastrointestinal:  Negative for abdominal pain, constipation, diarrhea and nausea.   Genitourinary:  Negative for difficulty urinating and dysuria.   Musculoskeletal: Negative.    Skin:  Negative for rash.   Neurological:  Negative for light-headedness and headaches.   Hematological:  Does not bruise/bleed easily.   Psychiatric/Behavioral:  Negative for agitation and behavioral problems.      Objective:     Vital Signs (Most Recent):  Temp: 98.2 °F (36.8 °C) (12/24/24 1547)  Pulse: 75 (12/24/24 1604)  Resp: 18 (12/24/24 1547)  BP: (!) 172/79 (12/24/24 1547)  SpO2: 100 % (12/24/24 1547) Vital Signs (24h Range):  Temp:  [97.8 °F (36.6 °C)-98.2 °F (36.8 °C)] 98.2 °F (36.8 °C)  Pulse:  [68-85] 75  Resp:  [16-18] 18  SpO2:  [100 %] 100 %  BP: (150-172)/(76-87) 172/79     Weight: 49.9 kg (110 lb)  Body mass index is 18.88 kg/m².  No intake or output data in the 24 hours ending 12/24/24 1724      Physical Exam  Constitutional:       General: She is not in acute distress.     Comments: Cachectic appearing   HENT:      Head: Normocephalic and atraumatic.      Mouth/Throat:      Mouth: Mucous membranes are moist.      Pharynx: Oropharynx is clear.   Eyes:      General: No scleral icterus.  Cardiovascular:      Rate and Rhythm: Normal rate and regular rhythm.      Heart sounds: No murmur heard.     No gallop.   Pulmonary:      Effort: Pulmonary effort is normal.      Breath sounds: Normal breath sounds.   Abdominal:      General: Bowel sounds are normal. There is no distension.      Comments: Does have suprapubic tenderness   Musculoskeletal:      Right lower leg: No edema.      Left lower leg: No edema.   Skin:     General: Skin is warm and dry.   Neurological:      Mental Status: She is alert. Mental status is at baseline.      Comments: Frequently confused, repeating herself  throughout the conversation   Psychiatric:         Mood and Affect: Mood normal.         Behavior: Behavior normal.             Significant Labs: All pertinent labs within the past 24 hours have been reviewed.    Significant Imaging: I have reviewed all pertinent imaging results/findings within the past 24 hours.    Assessment and Plan     * UTI (urinary tract infection)  Difficult to tell if truly UTI versus asymptomatic pyuria but patient does have suprapubic tenderness and is confused   Has a penicillin allergy so was started on ciprofloxacin which we will continue  Can follow-up urine culture results and switch to oral agent on discharge      Dementia  - The patient is accompanied by her friend from [facility] Dominick Loco ([phone])   - Her mental state has been declining and likely has undiagnosed dementia that is relatively severe at this point  - He and the friends who check on her worry about her ability to manage her life at home   - He states that her current presentation is her baseline   - Patient has continued to drive even though her license  and this is likely unsafe  - It sounds like the patient may need long-term care but she does not seem open to that  - No healthcare proxy listed  - Has a daughter who lives I believe in [place] but the patient does not want the daughter to be contacted, seems they are estranged   - Apparently has a sister as well but no one has ever talked to her  - Has had repeated episodes of forgetfulness and paranoia, for example on 1 episode she drove her car somewhere and forgot she had done so, then accused someone of stealing it.  Another time her dog scratched her arm and she accused her neighbor of slashing her arm with a knife  - Patient may need skilled nursing for rehab on discharge in which case she could then be transitioned to alf care    Hypertension  Patient's blood pressure range in the last 24 hours was: BP  Min: 150/78  Max: 172/79.The patient's  inpatient anti-hypertensive regimen is listed below:  Current Antihypertensives  bisoprolol-hydrochlorothiazide 5-6.25 mg per tablet 1 tablet, Daily, Oral      Unclear for home medication list is accurate but her blood pressure is high and bisoprolol/hydrochlorothiazide seems like a reasonable option so we will continue that for now  It is likely she has not been taking her medication at home        VTE Risk Mitigation (From admission, onward)           Ordered     enoxaparin injection 40 mg  Daily         12/24/24 1720     IP VTE HIGH RISK PATIENT  Once         12/24/24 1720     Place sequential compression device  Until discontinued         12/24/24 1720                    Discharge Planning   MEJIA:      Code Status: Full Code   Medical Readiness for Discharge Date:              Aseal Landry MD  Department of Hospital Medicine   [facility] - Telemetry ([facility])

## 2024-12-24 NOTE — SUBJECTIVE & OBJECTIVE
Review of Systems   Constitutional:  Negative for fatigue and fever.   HENT: Negative.     Respiratory:  Negative for chest tightness and shortness of breath.    Cardiovascular:  Negative for chest pain and leg swelling.   Gastrointestinal:  Negative for abdominal pain, constipation, diarrhea and nausea.   Genitourinary:  Negative for difficulty urinating and dysuria.   Musculoskeletal: Negative.    Skin:  Negative for rash.   Neurological:  Negative for light-headedness and headaches.   Hematological:  Does not bruise/bleed easily.   Psychiatric/Behavioral:  Negative for agitation and behavioral problems.      Objective:     Vital Signs (Most Recent):  Temp: 98.2 °F (36.8 °C) (12/24/24 1547)  Pulse: 75 (12/24/24 1604)  Resp: 18 (12/24/24 1547)  BP: (!) 172/79 (12/24/24 1547)  SpO2: 100 % (12/24/24 1547) Vital Signs (24h Range):  Temp:  [97.8 °F (36.6 °C)-98.2 °F (36.8 °C)] 98.2 °F (36.8 °C)  Pulse:  [68-85] 75  Resp:  [16-18] 18  SpO2:  [100 %] 100 %  BP: (150-172)/(76-87) 172/79     Weight: 49.9 kg (110 lb)  Body mass index is 18.88 kg/m².  No intake or output data in the 24 hours ending 12/24/24 1724      Physical Exam  Constitutional:       General: She is not in acute distress.     Comments: Cachectic appearing   HENT:      Head: Normocephalic and atraumatic.      Mouth/Throat:      Mouth: Mucous membranes are moist.      Pharynx: Oropharynx is clear.   Eyes:      General: No scleral icterus.  Cardiovascular:      Rate and Rhythm: Normal rate and regular rhythm.      Heart sounds: No murmur heard.     No gallop.   Pulmonary:      Effort: Pulmonary effort is normal.      Breath sounds: Normal breath sounds.   Abdominal:      General: Bowel sounds are normal. There is no distension.      Comments: Does have suprapubic tenderness   Musculoskeletal:      Right lower leg: No edema.      Left lower leg: No edema.   Skin:     General: Skin is warm and dry.   Neurological:      Mental Status: She is alert. Mental  status is at baseline.      Comments: Frequently confused, repeating herself throughout the conversation   Psychiatric:         Mood and Affect: Mood normal.         Behavior: Behavior normal.             Significant Labs: All pertinent labs within the past 24 hours have been reviewed.    Significant Imaging: I have reviewed all pertinent imaging results/findings within the past 24 hours.

## 2024-12-24 NOTE — ASSESSMENT & PLAN NOTE
Patient's blood pressure range in the last 24 hours was: BP  Min: 150/78  Max: 172/79.The patient's inpatient anti-hypertensive regimen is listed below:  Current Antihypertensives  bisoprolol-hydrochlorothiazide 5-6.25 mg per tablet 1 tablet, Daily, Oral    Not previously taking medication as no recent PCP follow-up.

## 2024-12-24 NOTE — NURSING TRANSFER
Nursing Transfer Note      12/24/2024   1547 PM    Nurse giving handoff:Luz RN  Nurse receiving handoff:Sofía RN    Reason patient is being transferred: Admission    Transfer From: ED    Transfer via stretcher    Transfer with cardiac monitoring    Transported by Transporter    Transfer Vital Signs:  Blood Pressure:172/79  Heart Rate:73  O2:100  Temperature:98.2  Respirations:18    Telemetry: Box Number [id number], Rate 64, and Rhythm NSR  Order for Tele Monitor? Yes    Additional Lines: N/A    Medicines sent: N/A    Any special needs or follow-up needed: N/A    Patient belongings transferred with patient: Yes    Chart send with patient: Yes    Notified: friend    Patient reassessed at: 12/24/24 8327 (date, time)  1  Upon arrival to floor: cardiac monitor applied, patient oriented to room, call bell in reach, and bed in lowest position

## 2024-12-24 NOTE — ASSESSMENT & PLAN NOTE
-likely  -confusional state noted in last few medical encounters  -psych consulted as suspect patient lacks capacity  -will need safe plan for discharge.

## 2024-12-25 LAB
ANION GAP SERPL CALC-SCNC: 8 MMOL/L (ref 8–16)
BASOPHILS # BLD AUTO: 0.06 K/UL (ref 0–0.2)
BASOPHILS NFR BLD: 1 % (ref 0–1.9)
BUN SERPL-MCNC: 14 MG/DL (ref 8–23)
CALCIUM SERPL-MCNC: 9 MG/DL (ref 8.7–10.5)
CHLORIDE SERPL-SCNC: 105 MMOL/L (ref 95–110)
CO2 SERPL-SCNC: 25 MMOL/L (ref 23–29)
CREAT SERPL-MCNC: 0.8 MG/DL (ref 0.5–1.4)
DIFFERENTIAL METHOD BLD: ABNORMAL
EOSINOPHIL # BLD AUTO: 0.1 K/UL (ref 0–0.5)
EOSINOPHIL NFR BLD: 0.9 % (ref 0–8)
ERYTHROCYTE [DISTWIDTH] IN BLOOD BY AUTOMATED COUNT: 12.4 % (ref 11.5–14.5)
EST. GFR  (NO RACE VARIABLE): >60 ML/MIN/1.73 M^2
GLUCOSE SERPL-MCNC: 92 MG/DL (ref 70–110)
HCT VFR BLD AUTO: 36.4 % (ref 37–48.5)
HGB BLD-MCNC: 11.9 G/DL (ref 12–16)
IMM GRANULOCYTES # BLD AUTO: 0.02 K/UL (ref 0–0.04)
IMM GRANULOCYTES NFR BLD AUTO: 0.3 % (ref 0–0.5)
LYMPHOCYTES # BLD AUTO: 1.3 K/UL (ref 1–4.8)
LYMPHOCYTES NFR BLD: 21.6 % (ref 18–48)
MCH RBC QN AUTO: 30.7 PG (ref 27–31)
MCHC RBC AUTO-ENTMCNC: 32.7 G/DL (ref 32–36)
MCV RBC AUTO: 94 FL (ref 82–98)
MONOCYTES # BLD AUTO: 0.6 K/UL (ref 0.3–1)
MONOCYTES NFR BLD: 9.8 % (ref 4–15)
NEUTROPHILS # BLD AUTO: 3.8 K/UL (ref 1.8–7.7)
NEUTROPHILS NFR BLD: 66.4 % (ref 38–73)
NRBC BLD-RTO: 0 /100 WBC
OHS QRS DURATION: 84 MS
OHS QTC CALCULATION: 432 MS
PLATELET # BLD AUTO: 232 K/UL (ref 150–450)
PMV BLD AUTO: 9.8 FL (ref 9.2–12.9)
POTASSIUM SERPL-SCNC: 3.7 MMOL/L (ref 3.5–5.1)
RBC # BLD AUTO: 3.87 M/UL (ref 4–5.4)
SODIUM SERPL-SCNC: 138 MMOL/L (ref 136–145)
WBC # BLD AUTO: 5.79 K/UL (ref 3.9–12.7)

## 2024-12-25 PROCEDURE — 96375 TX/PRO/DX INJ NEW DRUG ADDON: CPT

## 2024-12-25 PROCEDURE — 25000003 PHARM REV CODE 250: Performed by: STUDENT IN AN ORGANIZED HEALTH CARE EDUCATION/TRAINING PROGRAM

## 2024-12-25 PROCEDURE — 80048 BASIC METABOLIC PNL TOTAL CA: CPT | Performed by: STUDENT IN AN ORGANIZED HEALTH CARE EDUCATION/TRAINING PROGRAM

## 2024-12-25 PROCEDURE — 96372 THER/PROPH/DIAG INJ SC/IM: CPT | Performed by: STUDENT IN AN ORGANIZED HEALTH CARE EDUCATION/TRAINING PROGRAM

## 2024-12-25 PROCEDURE — G0378 HOSPITAL OBSERVATION PER HR: HCPCS

## 2024-12-25 PROCEDURE — 36415 COLL VENOUS BLD VENIPUNCTURE: CPT | Performed by: STUDENT IN AN ORGANIZED HEALTH CARE EDUCATION/TRAINING PROGRAM

## 2024-12-25 PROCEDURE — 85025 COMPLETE CBC W/AUTO DIFF WBC: CPT | Performed by: STUDENT IN AN ORGANIZED HEALTH CARE EDUCATION/TRAINING PROGRAM

## 2024-12-25 PROCEDURE — 63600175 PHARM REV CODE 636 W HCPCS: Performed by: EMERGENCY MEDICINE

## 2024-12-25 PROCEDURE — 63600175 PHARM REV CODE 636 W HCPCS: Performed by: INTERNAL MEDICINE

## 2024-12-25 PROCEDURE — 63600175 PHARM REV CODE 636 W HCPCS: Performed by: STUDENT IN AN ORGANIZED HEALTH CARE EDUCATION/TRAINING PROGRAM

## 2024-12-25 PROCEDURE — 96366 THER/PROPH/DIAG IV INF ADDON: CPT

## 2024-12-25 PROCEDURE — 25000242 PHARM REV CODE 250 ALT 637 W/ HCPCS: Performed by: INTERNAL MEDICINE

## 2024-12-25 RX ORDER — FLUTICASONE PROPIONATE 50 MCG
2 SPRAY, SUSPENSION (ML) NASAL DAILY
Status: DISCONTINUED | OUTPATIENT
Start: 2024-12-25 | End: 2025-01-10 | Stop reason: HOSPADM

## 2024-12-25 RX ORDER — CIPROFLOXACIN 750 MG/1
750 TABLET, FILM COATED ORAL EVERY 12 HOURS
Status: DISCONTINUED | OUTPATIENT
Start: 2024-12-26 | End: 2024-12-28

## 2024-12-25 RX ORDER — HALOPERIDOL 5 MG/ML
2 INJECTION INTRAMUSCULAR ONCE
Status: COMPLETED | OUTPATIENT
Start: 2024-12-25 | End: 2024-12-25

## 2024-12-25 RX ADMIN — BISOPROLOL FUMARATE AND HYDROCHLOROTHIAZIDE 1 TABLET: 6.25; 2.5 TABLET ORAL at 08:12

## 2024-12-25 RX ADMIN — FLUTICASONE PROPIONATE 100 MCG: 50 SPRAY, METERED NASAL at 10:12

## 2024-12-25 RX ADMIN — CIPROFLOXACIN 400 MG: 2 INJECTION, SOLUTION INTRAVENOUS at 04:12

## 2024-12-25 RX ADMIN — ASPIRIN 81 MG: 81 TABLET, COATED ORAL at 08:12

## 2024-12-25 RX ADMIN — CIPROFLOXACIN 400 MG: 2 INJECTION, SOLUTION INTRAVENOUS at 03:12

## 2024-12-25 RX ADMIN — HALOPERIDOL LACTATE 2 MG: 5 INJECTION, SOLUTION INTRAMUSCULAR at 01:12

## 2024-12-25 RX ADMIN — ENOXAPARIN SODIUM 40 MG: 40 INJECTION SUBCUTANEOUS at 04:12

## 2024-12-25 NOTE — PLAN OF CARE
A241/A241 SVITLANACally Staples is a 79 y.o.female admitted on 12/24/2024 for UTI (urinary tract infection)   Code Status: Full Code MRN: [id number]   Review of patient's allergies indicates:   Allergen Reactions    Pcn [penicillins] Hives     Past Medical History:   Diagnosis Date    History of oral surgery     Hyperlipidemia     Hypertension     IBS (irritable bowel syndrome)     Rectocele     Recurrent UTI       PRN meds    dextrose 50%, 12.5 g, PRN  dextrose 50%, 25 g, PRN  glucagon (human recombinant), 1 mg, PRN  glucose, 16 g, PRN  glucose, 24 g, PRN  naloxone, 0.02 mg, PRN  sodium chloride 0.9%, 10 mL, Q12H PRN      Chart check completed. Will continue plan of care.      Orientation: disoriented to, situation  [place] Coma Scale Score: 15     Lead Monitored: Lead II Rhythm: normal sinus rhythm       VTE Core Measure: (SCDs) Sequential compression device initiated/maintained Last Bowel Movement: 12/23/24  Diet Adult Regular  Voiding Characteristics: voids spontaneously without difficulty  Kraig Score: 22  Fall Risk Score: 2  Accucheck []   Freq?      Lines/Drains/Airways       Peripheral Intravenous Line  Duration                  Peripheral IV - Single Lumen 12/24/24 1300 20 G Anterior;Proximal;Right Forearm <1 day

## 2024-12-25 NOTE — PROGRESS NOTES
[facility] Medicine  Progress Note    Patient Name: Lucy Staples  MRN: [id number]  Patient Class: OP- Observation   Admission Date: 2024  Length of Stay: 0 days  Attending Physician: Veena Gibbons MD  Primary Care Provider: Lamin Webber MD        Subjective     Principal Problem:UTI (urinary tract infection)        HPI:  This is an 79-year-old woman who lives alone with hypertension, hyperlipidemia who was confused and called the police department, ultimately ended up in the hospital and was found to have evidence of a UTI     Patient is accompanied by her friend from [facility], Dominick Loco ([phone]) who explains patient has a good support network with friends and neighbors who help to take care of her at home by checking on her intermittently.  It is unclear what happened today, the patient does not remember, but per the ED notes the patient could not locate her keys and called the police department who then called for an ambulance.  The patient is asymptomatic at this time and of note denies dysuria or urinary frequency.  She states that she still drives and then explained that her license has .      It sounds like the home situation may not be tenable anymore as the patient likely has undiagnosed dementia and difficulty caring for herself.  Does have a PCP but does not see her regularly.  On the day of appointments she gets worried and then does not go.  Been and her friends have been trying to get her to see the doctor unsuccessfully.      In the ED, vitals notable for some hypertension with blood pressure is 160/70s.  CBC and CMP were normal.  Urinalysis with 11 WBCs per high-power field and some leukocyte esterase.  She was diagnosed with a urinary tract infection and referred for admission.  She was started on ciprofloxacin    Overview/Hospital Course:  The patient presented to the ED after calling the police. She was noted to have confusion. Police  department called EMS. Workup consistent with UTI. Patient placed on empiric antibiotics. Psychiatry consulted as patient does not appear to have capacity.    Interval History: f/u confusion patient likely has dementia. Noted several visits for confusion since 10/18/2023. Psych consulted. Patient agitated because she wants to leave.     Review of Systems  Objective:     Vital Signs (Most Recent):  Temp: 98.8 °F (37.1 °C) (12/25/24 1052)  Pulse: 78 (12/25/24 1300)  Resp: 18 (12/25/24 1052)  BP: 120/73 (12/25/24 1052)  SpO2: 100 % (12/25/24 1052) Vital Signs (24h Range):  Temp:  [97.7 °F (36.5 °C)-98.8 °F (37.1 °C)] 98.8 °F (37.1 °C)  Pulse:  [68-97] 78  Resp:  [18] 18  SpO2:  [97 %-100 %] 100 %  BP: (120-160)/(70-76) 120/73     Weight: 49.9 kg (110 lb 0.2 oz)  Body mass index is 18.88 kg/m².    Intake/Output Summary (Last 24 hours) at 12/25/2024 1608  Last data filed at 12/25/2024 0532  Gross per 24 hour   Intake 512.72 ml   Output --   Net 512.72 ml         Physical Exam  HENT:      Head: Normocephalic and atraumatic.   Cardiovascular:      Rate and Rhythm: Normal rate and regular rhythm.      Heart sounds: No murmur heard.  Pulmonary:      Effort: Pulmonary effort is normal. No respiratory distress.      Breath sounds: Normal breath sounds. No wheezing.   Abdominal:      General: Bowel sounds are normal. There is no distension.      Palpations: Abdomen is soft.      Tenderness: There is no abdominal tenderness.   Musculoskeletal:         General: No swelling.   Skin:     General: Skin is warm and dry.   Neurological:      Mental Status: She is alert. She is confused.      Comments: Oriented to self             Significant Labs: All pertinent labs within the past 24 hours have been reviewed.  Recent Lab Results         12/25/24  0456        Anion Gap 8       Baso # 0.06       Basophil % 1.0       BUN 14       Calcium 9.0       Chloride 105       CO2 25       Creatinine 0.8       Differential Method Automated        eGFR >60       Eos # 0.1       Eos % 0.9       Glucose 92       Gran # (ANC) 3.8       Gran % 66.4       Hematocrit 36.4       Hemoglobin 11.9       Immature Grans (Abs) 0.02  Comment: Mild elevation in immature granulocytes is non specific and   can be seen in a variety of conditions including stress response,   acute inflammation, trauma and pregnancy. Correlation with other   laboratory and clinical findings is essential.         Immature Granulocytes 0.3       Lymph # 1.3       Lymph % 21.6       MCH 30.7       MCHC 32.7       MCV 94       Mono # 0.6       Mono % 9.8       MPV 9.8       nRBC 0       Platelet Count 232       Potassium 3.7       RBC 3.87       RDW 12.4       Sodium 138       WBC 5.79               Significant Imaging: I have reviewed all pertinent imaging results/findings within the past 24 hours.    Assessment and Plan     * UTI (urinary tract infection)  -continue cipro  -follow-up urine culture      Dementia  -likely  -confusional state noted in last few medical encounters  -psych consulted as suspect patient lacks capacity  -will need safe plan for discharge.    Hypertension  Patient's blood pressure range in the last 24 hours was: BP  Min: 150/78  Max: 172/79.The patient's inpatient anti-hypertensive regimen is listed below:  Current Antihypertensives  bisoprolol-hydrochlorothiazide 5-6.25 mg per tablet 1 tablet, Daily, Oral    Not previously taking medication as no recent PCP follow-up.       VTE Risk Mitigation (From admission, onward)           Ordered     enoxaparin injection 40 mg  Daily         12/24/24 1720     IP VTE HIGH RISK PATIENT  Once         12/24/24 1720     Place sequential compression device  Until discontinued         12/24/24 1720                    Discharge Planning   MEJIA:      Code Status: Full Code   Medical Readiness for Discharge Date:                    Please place Justification for DME        Veena Gibbons MD  Department of Hospital Medicine   [facility] -  Telemetry ([facility])

## 2024-12-25 NOTE — HOSPITAL COURSE
The patient presented to the ED after calling the police. She was noted to have confusion. Police department called EMS. Workup consistent with UTI. Patient placed on empiric antibiotics. Psychiatry consulted as patient does not appear to have capacity. Urine culture with multiple organisms. Repeat UA. Participated with therapy. 24 hour supervision recommended. Patient does not have 24 hour supervision. She has friends who check on her but are unable to provide 24 hour supervision. Skilled placement being pursued. Patient accepted to facility, awaiting insurance authorization.    Alert to self, in bed. Psych robe initially recommends discussing discharge options with patient, ideally with identified support person present. Wonder if intermediate option such as home health would be more in line with patient's expressed preferences. After more discussion with staff, patient's friends and caregiver, psych does not feel that patient currently has the capacity to make decisions regarding discharge plan due to likely major neurocognitive disorder, which is unlikely to improve with treatment of UTI : Able to state she was in hospital, but unable to say which or where. Some confusion regarding current events.CM working on insurance authorization for SNF placement.     continues to work on insurance verification for placement. Was denied SNF placement by insurance. CM will move to potential nursing home placement.  Due to lack of PCP follow up patient does not have an official diagnosis of dementia although shows clear signs of dementia.  Workup to rule out other causes of altered mental status including TSH, RPR, tau protein, amyloid, RPR, folate level, and B12 has been ordered.  Neurology was consulted and discussion was held with the neurologist who stated that an inpatient diagnosis of dementia is not possible and therefore would need to follow up as an outpatient with Neurology.  Given her situation with  "lack of family, patient is not a safe discharge home.  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  She roams the halls and therefore a risk for elopement at a nursing home without a memory care unit.  She is pleasantly demented and is not aggressive or agitated.    01/07/2025  Awaiting SNF placement.    1/8- advanced dementia- social disposition, awaiting NH placement, doing well, eating drinking well, walking around well.     1/9- looks well, walking and talking, eating well, pleasantly confused, disoriented. Await placement, SW arranging.    1/10- looks great, standing in the milan, all dressed up and ready to go to her new NH where she has been accepted. She is eating drinking well, walking around, she was seen and examined and deemed stable for discharge home today.   "

## 2024-12-25 NOTE — SUBJECTIVE & OBJECTIVE
Past Medical History:   Diagnosis Date    History of oral surgery     Hyperlipidemia     Hypertension     IBS (irritable bowel syndrome)     Rectocele     Recurrent UTI        Past Surgical History:   Procedure Laterality Date    HYSTERECTOMY      SINUS SURGERY         Review of patient's allergies indicates:   Allergen Reactions    Pcn [penicillins] Hives       No current facility-administered medications on file prior to encounter.     Current Outpatient Medications on File Prior to Encounter   Medication Sig    acetaminophen (TYLENOL) 650 MG TbSR Take 650 mg by mouth continuous prn.    aspirin (ECOTRIN) 81 MG EC tablet Take 81 mg by mouth once daily.    bisoprolol-hydrochlorothiazide 5-6.25 mg (ZIAC) 5-6.25 mg Tab Take 1 tablet by mouth once daily.     calcium carbonate (CALCIUM ANTACID) 300 mg (750 mg) Chew Take 1 tablet by mouth 2 (two) times daily.    estradioL (ESTRACE) 0.01 % (0.1 mg/gram) vaginal cream INSERT 1 GRAM IN THE VAGINA TWICE WEEKLY AS DIRECTED    lansoprazole (PREVACID) 30 MG capsule Take 30 mg by mouth as needed.     simvastatin (ZOCOR) 20 MG tablet Take 20 mg by mouth every evening.      Family History    None       Tobacco Use    Smoking status: Never    Smokeless tobacco: Never   Substance and Sexual Activity    Alcohol use: No    Drug use: No    Sexual activity: Not Currently     Review of Systems   Constitutional:  Negative for fatigue and fever.   HENT: Negative.     Respiratory:  Negative for chest tightness and shortness of breath.    Cardiovascular:  Negative for chest pain and leg swelling.   Gastrointestinal:  Negative for abdominal pain, constipation, diarrhea and nausea.   Genitourinary:  Negative for difficulty urinating and dysuria.   Musculoskeletal: Negative.    Skin:  Negative for rash.   Neurological:  Negative for light-headedness and headaches.   Hematological:  Does not bruise/bleed easily.   Psychiatric/Behavioral:  Negative for agitation and behavioral problems.        Objective:      Vital Signs (Most Recent):  Temp: 98.2 °F (36.8 °C) (12/24/24 1547)  Pulse: 75 (12/24/24 1604)  Resp: 18 (12/24/24 1547)  BP: (!) 172/79 (12/24/24 1547)  SpO2: 100 % (12/24/24 1547) Vital Signs (24h Range):  Temp:  [97.8 °F (36.6 °C)-98.2 °F (36.8 °C)] 98.2 °F (36.8 °C)  Pulse:  [68-85] 75  Resp:  [16-18] 18  SpO2:  [100 %] 100 %  BP: (150-172)/(76-87) 172/79      Weight: 49.9 kg (110 lb)  Body mass index is 18.88 kg/m².  No intake or output data in the 24 hours ending 12/24/24 1724      Physical Exam  Constitutional:       General: She is not in acute distress.     Comments: Cachectic appearing   HENT:      Head: Normocephalic and atraumatic.      Mouth/Throat:      Mouth: Mucous membranes are moist.      Pharynx: Oropharynx is clear.   Eyes:      General: No scleral icterus.  Cardiovascular:      Rate and Rhythm: Normal rate and regular rhythm.      Heart sounds: No murmur heard.     No gallop.   Pulmonary:      Effort: Pulmonary effort is normal.      Breath sounds: Normal breath sounds.   Abdominal:      General: Bowel sounds are normal. There is no distension.      Comments: Does have suprapubic tenderness   Musculoskeletal:      Right lower leg: No edema.      Left lower leg: No edema.   Skin:     General: Skin is warm and dry.   Neurological:      Mental Status: She is alert. Mental status is at baseline.      Comments: Frequently confused, repeating herself throughout the conversation   Psychiatric:         Mood and Affect: Mood normal.         Behavior: Behavior normal.      Significant Labs: All pertinent labs within the past 24 hours have been reviewed.    Significant Imaging: I have reviewed all pertinent imaging results/findings within the past 24 hours.

## 2024-12-25 NOTE — SUBJECTIVE & OBJECTIVE
Interval History: f/u confusion patient likely has dementia. Noted several visits for confusion since 10/18/2023. Psych consulted. Patient agitated because she wants to leave.     Review of Systems  Objective:     Vital Signs (Most Recent):  Temp: 98.8 °F (37.1 °C) (12/25/24 1052)  Pulse: 78 (12/25/24 1300)  Resp: 18 (12/25/24 1052)  BP: 120/73 (12/25/24 1052)  SpO2: 100 % (12/25/24 1052) Vital Signs (24h Range):  Temp:  [97.7 °F (36.5 °C)-98.8 °F (37.1 °C)] 98.8 °F (37.1 °C)  Pulse:  [68-97] 78  Resp:  [18] 18  SpO2:  [97 %-100 %] 100 %  BP: (120-160)/(70-76) 120/73     Weight: 49.9 kg (110 lb 0.2 oz)  Body mass index is 18.88 kg/m².    Intake/Output Summary (Last 24 hours) at 12/25/2024 1608  Last data filed at 12/25/2024 0532  Gross per 24 hour   Intake 512.72 ml   Output --   Net 512.72 ml         Physical Exam  HENT:      Head: Normocephalic and atraumatic.   Cardiovascular:      Rate and Rhythm: Normal rate and regular rhythm.      Heart sounds: No murmur heard.  Pulmonary:      Effort: Pulmonary effort is normal. No respiratory distress.      Breath sounds: Normal breath sounds. No wheezing.   Abdominal:      General: Bowel sounds are normal. There is no distension.      Palpations: Abdomen is soft.      Tenderness: There is no abdominal tenderness.   Musculoskeletal:         General: No swelling.   Skin:     General: Skin is warm and dry.   Neurological:      Mental Status: She is alert. She is confused.      Comments: Oriented to self             Significant Labs: All pertinent labs within the past 24 hours have been reviewed.  Recent Lab Results         12/25/24  0456        Anion Gap 8       Baso # 0.06       Basophil % 1.0       BUN 14       Calcium 9.0       Chloride 105       CO2 25       Creatinine 0.8       Differential Method Automated       eGFR >60       Eos # 0.1       Eos % 0.9       Glucose 92       Gran # (ANC) 3.8       Gran % 66.4       Hematocrit 36.4       Hemoglobin 11.9       Immature  Grans (Abs) 0.02  Comment: Mild elevation in immature granulocytes is non specific and   can be seen in a variety of conditions including stress response,   acute inflammation, trauma and pregnancy. Correlation with other   laboratory and clinical findings is essential.         Immature Granulocytes 0.3       Lymph # 1.3       Lymph % 21.6       MCH 30.7       MCHC 32.7       MCV 94       Mono # 0.6       Mono % 9.8       MPV 9.8       nRBC 0       Platelet Count 232       Potassium 3.7       RBC 3.87       RDW 12.4       Sodium 138       WBC 5.79               Significant Imaging: I have reviewed all pertinent imaging results/findings within the past 24 hours.
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
## 2024-12-26 PROBLEM — E44.0 MODERATE MALNUTRITION: Status: ACTIVE | Noted: 2024-12-26

## 2024-12-26 LAB
BACTERIA UR CULT: NORMAL
BACTERIA UR CULT: NORMAL

## 2024-12-26 PROCEDURE — 97530 THERAPEUTIC ACTIVITIES: CPT

## 2024-12-26 PROCEDURE — 97162 PT EVAL MOD COMPLEX 30 MIN: CPT

## 2024-12-26 PROCEDURE — 97166 OT EVAL MOD COMPLEX 45 MIN: CPT

## 2024-12-26 PROCEDURE — 96372 THER/PROPH/DIAG INJ SC/IM: CPT | Performed by: STUDENT IN AN ORGANIZED HEALTH CARE EDUCATION/TRAINING PROGRAM

## 2024-12-26 PROCEDURE — G0378 HOSPITAL OBSERVATION PER HR: HCPCS

## 2024-12-26 PROCEDURE — 25000003 PHARM REV CODE 250: Performed by: INTERNAL MEDICINE

## 2024-12-26 PROCEDURE — 25000003 PHARM REV CODE 250: Performed by: STUDENT IN AN ORGANIZED HEALTH CARE EDUCATION/TRAINING PROGRAM

## 2024-12-26 PROCEDURE — 63600175 PHARM REV CODE 636 W HCPCS: Performed by: STUDENT IN AN ORGANIZED HEALTH CARE EDUCATION/TRAINING PROGRAM

## 2024-12-26 RX ADMIN — CIPROFLOXACIN HYDROCHLORIDE 750 MG: 750 TABLET, FILM COATED ORAL at 08:12

## 2024-12-26 RX ADMIN — CIPROFLOXACIN HYDROCHLORIDE 750 MG: 750 TABLET, FILM COATED ORAL at 05:12

## 2024-12-26 RX ADMIN — ASPIRIN 81 MG: 81 TABLET, COATED ORAL at 09:12

## 2024-12-26 RX ADMIN — ENOXAPARIN SODIUM 40 MG: 40 INJECTION SUBCUTANEOUS at 05:12

## 2024-12-26 RX ADMIN — FLUTICASONE PROPIONATE 100 MCG: 50 SPRAY, METERED NASAL at 09:12

## 2024-12-26 NOTE — SUBJECTIVE & OBJECTIVE
Interval History: f/u confusion patient evaluated by psychiatry. Discussed plan of care with patient's [employer] friend. Concerned about her living alone. He has reached out to EPS and [employer] [employer].    Review of Systems  Objective:     Vital Signs (Most Recent):  Temp: 98.2 °F (36.8 °C) (12/26/24 1309)  Pulse: 80 (12/26/24 1524)  Resp: 16 (12/26/24 1159)  BP: 132/71 (12/26/24 1159)  SpO2: 99 % (12/26/24 1159) Vital Signs (24h Range):  Temp:  [97.7 °F (36.5 °C)-98.2 °F (36.8 °C)] 98.2 °F (36.8 °C)  Pulse:  [50-83] 80  Resp:  [16-18] 16  SpO2:  [99 %-100 %] 99 %  BP: (102-136)/(52-71) 132/71     Weight: 49.9 kg (110 lb 0.2 oz)  Body mass index is 18.88 kg/m².    Intake/Output Summary (Last 24 hours) at 12/26/2024 1636  Last data filed at 12/25/2024 1804  Gross per 24 hour   Intake 240 ml   Output --   Net 240 ml         Physical Exam  HENT:      Head: Normocephalic and atraumatic.   Cardiovascular:      Rate and Rhythm: Normal rate and regular rhythm.      Heart sounds: No murmur heard.  Pulmonary:      Effort: Pulmonary effort is normal. No respiratory distress.      Breath sounds: Normal breath sounds. No wheezing.   Abdominal:      General: Bowel sounds are normal. There is no distension.      Palpations: Abdomen is soft.      Tenderness: There is no abdominal tenderness.   Musculoskeletal:         General: No swelling.   Skin:     General: Skin is warm and dry.   Neurological:      Mental Status: She is alert. Mental status is at baseline. She is confused.             Significant Labs: All pertinent labs within the past 24 hours have been reviewed.  Significant Imaging: I have reviewed all pertinent imaging results/findings within the past 24 hours.

## 2024-12-26 NOTE — PT/OT/SLP EVAL
Occupational Therapy   Evaluation    Name: Lucy Staples  MRN: [id number]  Admitting Diagnosis: UTI (urinary tract infection)  Recent Surgery: * No surgery found *      Recommendations:     Discharge Recommendations: Low Intensity Therapy  Discharge Equipment Recommendations:  none  Barriers to discharge:  Decreased caregiver support    Assessment:     Lucy Staples is a 79 y.o. female with a medical diagnosis of UTI (urinary tract infection).  She presents with the following performance deficits affecting function: weakness, impaired endurance, impaired self care skills, impaired functional mobility, gait instability, impaired balance, impaired cognition, decreased lower extremity function, decreased safety awareness.      Rehab Prognosis: Good; patient would benefit from acute skilled OT services to address these deficits and reach maximum level of function.       Plan:     Patient to be seen 2 x/week to address the above listed problems via self-care/home management, therapeutic activities, therapeutic exercises  Plan of Care Expires: 01/09/25  Plan of Care Reviewed with: patient    Subjective     Chief Complaint: UTI  Patient/Family Comments/goals: Pt motivated to get OOB    Occupational Profile:  Living Environment: lives alone with pet dog in a 1 story house with 1-2 steps to enter. Tub/shower combo.  Previous level of function: Pt (I) with ADLs and functional mobility.  Roles and Routines: drives and is retired  Equipment Used at Home: none  Assistance upon Discharge: unknown    Pain/Comfort:  Pain Rating 1: 0/10    Objective:     Communicated with: Nurse and epic chart review prior to session.  Patient found HOB elevated with peripheral IV, telemetry, Other (comments) (1:1 sitter) upon OT entry to room.    General Precautions: Standard, fall  Orthopedic Precautions: N/A  Braces: N/A  Respiratory Status: Room air    Occupational Performance:    Bed Mobility:    Patient completed  Rolling/Turning to Left with  supervision  Patient completed Scooting/Bridging with supervision  Patient completed Supine to Sit with supervision    Functional Mobility/Transfers:  Patient completed Sit <> Stand Transfer with stand by assistance  with  no assistive device   Patient completed Bed <> Chair Transfer using Stand Pivot technique with stand by assistance with no assistive device  Functional Mobility: Patient completed x240ft functional mobility with SBA and no AD to increase dynamic standing balance and activity tolerance needed for ADL completion.     Activities of Daily Living:  Lower Body Dressing: setup A. Dwight shoes while sitting in chair    Cognitive/Visual Perceptual:  Cognitive/Psychosocial Skills:     -       Oriented to: Person and Place   -       Follows Commands/attention:Follows two-step commands  -       Communication: clear/fluent  -       Memory: Poor immediate recall  -       Safety awareness/insight to disability: impaired   -       Mood/Affect/Coping skills/emotional control: Cooperative and Pleasant  Pt unable to state year or recent holiday.    Physical Exam:  Sensation:    -       Intact  Upper Extremity Range of Motion:     -       Right Upper Extremity: WNL  -       Left Upper Extremity: WNL  Upper Extremity Strength:    -       Right Upper Extremity: 4/5 grossly  -       Left Upper Extremity: 4/5 grossly   Strength:    -       Right Upper Extremity: WFL  -       Left Upper Extremity: WFL    AMPAC 6 Click ADL:  AMPAC Total Score: 22    Treatment & Education:  Patient educated on role of OT in acute setting and benefits of participation. Educated on techniques to use to increase independence and decrease fall risk with functional transfers. Educated on importance of OOB activity and calling for A to transfer back to bed. Encouraged completion of B UE AROM therex throughout the day to tolerance to increase functional strength and activity tolerance. Educated patient on importance  of increased tolerance to upright position and direct impact on CV endurance and strength. Patient encouraged to sit up in chair for a minimum of 2 consecutive hours per day. Patient stated understanding and in agreement with POC.     Patient left up in chair with all lines intact, call button in reach, chair alarm on, and sitter present    GOALS:   Multidisciplinary Problems       Occupational Therapy Goals          Problem: Occupational Therapy    Goal Priority Disciplines Outcome Interventions   Occupational Therapy Goal     OT, PT/OT     Description: Goals to be met by: 1/9/25     Patient will increase functional independence with ADLs by performing:    UE Dressing with [place].  Grooming while standing at sink with [place].  Toileting from toilet with [place]ahan for hygiene and clothing management.   Toilet transfer to toilet with [place]ahan.  Upper extremity exercise program x15 reps per handout, with independence.                         History:     Past Medical History:   Diagnosis Date    History of oral surgery     Hyperlipidemia     Hypertension     IBS (irritable bowel syndrome)     Rectocele     Recurrent UTI          Past Surgical History:   Procedure Laterality Date    HYSTERECTOMY      SINUS SURGERY         Time Tracking:     OT Date of Treatment: 12/26/24  OT Start Time: 1420  OT Stop Time: 1445  OT Total Time (min): 25 min    Billable Minutes:Evaluation 15  Therapeutic Activity 10    12/26/2024  Roseline Daniel OT

## 2024-12-26 NOTE — CONSULTS
Inpatient consult to Telemedicine - Psyc  Consult performed by: Erica Garcia MD  Consult ordered by: Veena Gibbons MD  Reason for consult: Evaluate for capacity      [facility] TELEPSYCH[facility] CONSULTATION:     Patient agreeable to INITIAL consultation via telepsychiatry.  Available documentation has been reviewed, and pertinent elements of the chart have been incorporated into this evaluation where appropriate.    CONSULT START TIME: 12/26/2024 10:16 AM  CONSULT END TIME: 12/26/2024 1:30 PM    -- PATIENT IDENTIFIERS: Lucy Staples  [id number]  1945  79 y.o.  female  -- REQUESTING PROVIDER: Veena Gibbons MD *  -- SETTING: inpatient  -- SOURCES OF INFORMATION: PATIENT, provider(s), EHR/chart  -- PRESENT WITH PATIENT DURING EVALUATION: staff  -- CONSULTANT PROVIDER: Erica Garcia MD  -- LOCATION OF CONSULTANT: [place], [place]    -- LOCATION OF PATIENT: [facility] MED/TELE 2     History & Physical  Psychiatry    12/26/2024  MRN: [id number]  Reason for Consult: Evaluate for capacity   Consult placed by Dr. Veena Gibbons the Inpatient Attending    Consulting Psychiatrist Location: [place], [place]      History of Present Illness:   Lucy Staples is a 79 y.o. female with no prior psychiatric history, though concern for dementia. Admitted to hospital with confusion in the setting of UTI.     Per  Inpatient Attending progress note 12/25/24:  HPI:  This is an 79-year-old woman who lives alone with hypertension, hyperlipidemia who was confused and called the police department, ultimately ended up in the hospital and was found to have evidence of a UTI      Patient is accompanied by her friend from [facility], Dominick Loco ([phone]) who explains patient has a good support network with friends and neighbors who help to take care of her at home by checking on her intermittently.  It is unclear what happened today, the patient does not remember, but per the ED notes  "the patient could not locate her keys and called the police department who then called for an ambulance.  The patient is asymptomatic at this time and of note denies dysuria or urinary frequency.  She states that she still drives and then explained that her license has .       It sounds like the home situation may not be tenable anymore as the patient likely has undiagnosed dementia and difficulty caring for herself.  Does have a PCP but does not see her regularly.  On the day of appointments she gets worried and then does not go.  Been and her friends have been trying to get her to see the doctor unsuccessfully.       In the ED, vitals notable for some hypertension with blood pressure is 160/70s.  CBC and CMP were normal.  Urinalysis with 11 WBCs per high-power field and some leukocyte esterase.  She was diagnosed with a urinary tract infection and referred for admission.  She was started on ciprofloxacin     Overview/Hospital Course:  The patient presented to the ED after calling the police. She was noted to have confusion. Police department called EMS. Workup consistent with UTI. Patient placed on empiric antibiotics. Psychiatry consulted as patient does not appear to have capacity.     Interval History: f/u confusion patient likely has dementia. Noted several visits for confusion since 10/18/2023. Psych consulted. Patient agitated because she wants to leave.       Psychiatry Interview  Time started: 10:30 am  Pt seen via secure video visit. Pt is pleasant, easy to engage. Reports that she is doing "Fine, much better" today. Reports that she has had to be in the hospital since "the  I think," when asked what lead her to have to come into the hospital, says that she  wasn't getting enough water, and so things were moving "slower and slower." Thinks that neighbor brought her to the hospital. Feeling a lot better since she came in. Aware that she had a urinary tract infection, says that she knows several " "other ladies that have had this recently, says it is "going around." Reports that it has been explained to her that she needs to drink more water and needs to eat, says she doesn't really notice feeling hungry. When I point out that she has lost quite a bit of weight in the past year, pt reports that she was not aware of this.   With regards to mood, says that she has been "doing just fine," and is feeling a lot better.  Lives alone with her [place] baez. She is hoping to go home soon. Misses her dog, friends at her apartment help take care of dog when Ms. Staples is in the hospital. She denies feeling like she has been more confused recently and denies any trouble getting around. Reports that she has a big backyard, so doesn't have to take her dog on walks.   Not sure who she sees for primary care, says she hasn't gone in a while, didn't really think that there was anything wrong that required her to be seen. Reports that she is often visited by the Sisters from her [employer] ([employer]) and that they will bring her food. Has strong community with in her [employer] and her living environment, says that when she needs medical care she calls upon these supports.   Reports feeling tired lately, but not feeling depressed. Says she wasn't aware that she had an infection, just knew she wasn't feeling well.   Denies that there has been any discussion about going to a SNF or assisted living. Does not feel like she needs this, as she is able to get support from her community while living at home. Denies any thing stressing her out or any particular worries, only thing that is currently upsetting her is being away from her beloved dog.     On orientation questioning, when asked the date patient states that she doesn't pay attention to dates as she doesn't really have any need to. Will ask friends if she needs to know. When asked if she knows the year, says she does not, though does attempt to look and see if " it is written somewhere on her wall. Not aware yesterday was Kevin, though notes that she was in the hospital.     Denies any other concerns or questions.     Time Ended: 10:46am    Chart reviewed:  10/2023: Pt with ED presentation for confusion, noted to have been driving home from a doctor's appointment, couldn't remember how to get home, stopped at a police station  8/2024: Urgent care visit for dysuria, UTI, unable to name PCP or what (if any) meds she was on  11/1/2024: Brought in to urgent care by neighbor for dog scratch, neighbor reported pt had been having increased confusion/mental status changes x several months.   Has had minimal outpatient follow-up, last consistent documentation of outpatient visits are from 2020  Per chart review, has had weight loss of ~ 30 lb (140 lb---->110 lb) since ED visit 10/2023.     Past Medical History:   Diagnosis Date    History of oral surgery     Hyperlipidemia     Hypertension     IBS (irritable bowel syndrome)     Rectocele     Recurrent UTI          Review of patient's allergies indicates:   Allergen Reactions    Pcn [penicillins] Hives       Scheduled Meds:   aspirin  81 mg Oral Daily    bisoprolol-hydrochlorothiazide 2.5-6.25 mg  1 tablet Oral Daily    ciprofloxacin HCl  750 mg Oral Q12H    enoxparin  40 mg Subcutaneous Daily    fluticasone propionate  2 spray Each Nostril Daily       Current Facility-Administered Medications:     dextrose 50%, 12.5 g, Intravenous, PRN    dextrose 50%, 25 g, Intravenous, PRN    glucagon (human recombinant), 1 mg, Intramuscular, PRN    glucose, 16 g, Oral, PRN    glucose, 24 g, Oral, PRN    naloxone, 0.02 mg, Intravenous, PRN    sodium chloride 0.9%, 10 mL, Intravenous, Q12H PRN    Past Psychiatric History:  Previous Medication Trials: no   Previous Psychiatric Hospitalizations: no   Previous Suicide Attempts: no   History of Violence: no  Outpatient Psychiatrist: no    Social History:  Marital Status:   Children: 1  "  Employment Status/Info: retired  Housing Status: Lives alone in her apartment with her [facility]  Anglican/Spirituality: Involved in [employer] with good support  Access to gun: no    Substance Abuse History:  Denies. No hx of smoking.       Family Psychiatric History:   Non-contributory    OBJECTIVE:     Vitals:    12/26/24 0814   BP: (!) 102/52   Pulse: 72   Resp: 18   Temp: 98.1 °F (36.7 °C)         No results for input(s): "GLU", "CALCIUM", "ALBUMIN", "PROT", "NA", "K", "CO2", "CL", "BUN", "CREATININE", "ALKPHOS", "ALT", "AST", "BILITOT" in the last 24 hours.  Lab Results   Component Value Date    WBC 5.79 12/25/2024    HGB 11.9 (L) 12/25/2024    HCT 36.4 (L) 12/25/2024    MCV 94 12/25/2024     12/25/2024     Lab Results   Component Value Date     12/25/2024    BUN 14 12/25/2024    CREATININE 0.8 12/25/2024    TSH 0.519 12/24/2024    WBC 5.79 12/25/2024     No results found for: "PHENYTOIN", "PHENOBARB", "VALPROATE", "CBMZ"  Urinalysis  No results for input(s): "COLORU", "CLARITYU", "SPECGRAV", "PHUR", "PROTEINUA", "GLUCOSEU", "BILIRUBINCON", "BLOODU", "WBCU", "RBCU", "BACTERIA", "MUCUS", "NITRITE", "LEUKOCYTESUR", "UROBILINOGEN", "HYALINECASTS" in the last 24 hours.  No results for input(s): "POCTGLUCOSE" in the last 72 hours.      Mental Status Exam:  Appearance: age appropriate, malnourished, lying in bed  Behavior/Cooperation: friendly and cooperative  Speech: normal tone, normal rate, normal pitch, normal volume  Mood:  "Feeling better"  Affect: Euthymic, mildly anxious  Thought Process:  Linear, with occasional circling back to certain topics  Thought Content: normal, no suicidality, no homicidality, delusions, or paranoia  Sensorium: person, place, situation. Does not know the year or what holiday was yesterday  Cognition: Recent memory impaired. Concentration appears grossly intact on interview, follows along with questions and answers appropriately. Spells "EARTH" without issue, " "when asked to spell it backwards gives "H-T" and then is unable to complete the rest.   Insight: limited  Judgment: fair      ASSESSMENT/PLAN:   Lucy Staples is a 79 y.o. female with no prior psychiatric history, though concern for dementia, admitted after being brought in with confusion in the setting of UTI. Lives alone, though with strong community support. Concerns for dementia and increased confusion noted in documentation over the past year. Not consistent with outpatient follow up. Psychiatric consulted for capacity evaluation.      CAPACITY EVALUATION     Definition of Capacity for a given decision requires that the patient:  Understands the proposed treatment and/or the proposed options for her care.  Appreciates her own medical situation and can abstract how the treatments/proposed options for care apply to her medical situation.  Understands the consequences of her medical decisions in a reasonable and factual manner supported by the facts and her own values in a consistent fashion.  Demonstrates the ability to communicate a choice clearly and consistently.    There is concern that Ms. Staples does not have the capacity to decide to discharge home from the hospital as opposed to discharging to assisted living facility. However, it is not clear that these options (or alternatives) have been discussed with the patient in order for her to weigh in with understanding of the potential risks of declining proposed treatment plan. She is able to state her choice, which is to return home, clearly and consistently, and is able to state why she prefers to return home (her dog, her community). She is also able to identify mechanisms for engaging supports in the event of medical concerns. I cannot assess her ability to understand the proposed treatment plan and how it applies to her situation, as it is not clear that these have been discussed with her.   I would recommend discussion between patient, care " giver/support person, and primary team regarding treatment/discharge options, possibly with inclusion of an intermediate option such as home health visits. To demonstrate capacity, pt should be able to understand these options, and identify reasons for her choice. If patient can explain reasons for a choice that may not be the ideal one from a medical standpoint, she would still be considered capable of making that choice provided she can acknowledge potential risks of such.     Though not explicitly requested, I DO have concerns about patient continuing to drive, especially as her license is . May consider discussion with caretakers/community supports. Would be reasonable to make a report to the DMV.       DSM 5 DDX  Delirium- improving  Likely Major Neurocognitive Disorder    Recommendations:    Legal: No indications for PEC, patient is not acute danger to self or others. Disability status not a result of acute psychiatric concerns.   2. Recommend discussing discharge options with patient, ideally with identified support person present. Wonder if intermediate option such as home health would be more in line with patient's expressed preferences. See more extensive discussion above.   3. Recommend outpatient follow up with neurology for dementia evaluation.     Time with patient: 16 min  Additional time (chart review, documentation, communication): 35 min    Recommendations discussed with consulting team via secure chat.     Erica Garcia MD  [facility]  2024       Addendum 24 3:51 PM:  Received communication from Dr. Gibbons, team spoke with patient's friends/caretakers who have been very concerned about patient's well-being, they have filed a report with EPS, reached out to the [employer], and were considering going to the 's office for an OPC as they are concerned that patient is no longer able to care for herself safely. Her water was recently cut off  because she forgot to pay the bill, she has had worsening memory issues and paranoia. Based on this information, the threshold for patient to have capacity to make the decision to return home AMA is increased, as the risk to her safety appears to be more severe. Given this, do not feel that patient currently has the capacity to make decisions regarding discharge plan due to likely major neurocognitive disorder, which is unlikely to improve with treatment of UTI. Continued work with case management for discharge options as well as identifying potential health care proxy is recommended.

## 2024-12-26 NOTE — PROGRESS NOTES
[facility] Medicine  Progress Note    Patient Name: Lucy Staples  MRN: [id number]  Patient Class: OP- Observation   Admission Date: 2024  Length of Stay: 0 days  Attending Physician: Veena Gibobns MD  Primary Care Provider: Lamin Webber MD        Subjective     Principal Problem:UTI (urinary tract infection)        HPI:  This is an 79-year-old woman who lives alone with hypertension, hyperlipidemia who was confused and called the police department, ultimately ended up in the hospital and was found to have evidence of a UTI     Patient is accompanied by her friend from [facility], Dominick Loco ([phone]) who explains patient has a good support network with friends and neighbors who help to take care of her at home by checking on her intermittently.  It is unclear what happened today, the patient does not remember, but per the ED notes the patient could not locate her keys and called the police department who then called for an ambulance.  The patient is asymptomatic at this time and of note denies dysuria or urinary frequency.  She states that she still drives and then explained that her license has .      It sounds like the home situation may not be tenable anymore as the patient likely has undiagnosed dementia and difficulty caring for herself.  Does have a PCP but does not see her regularly.  On the day of appointments she gets worried and then does not go.  Been and her friends have been trying to get her to see the doctor unsuccessfully.      In the ED, vitals notable for some hypertension with blood pressure is 160/70s.  CBC and CMP were normal.  Urinalysis with 11 WBCs per high-power field and some leukocyte esterase.  She was diagnosed with a urinary tract infection and referred for admission.  She was started on ciprofloxacin    Overview/Hospital Course:  The patient presented to the ED after calling the police. She was noted to have confusion. Police  department called EMS. Workup consistent with UTI. Patient placed on empiric antibiotics. Psychiatry consulted as patient does not appear to have capacity. Urine culture with multiple organisms. Repeat UA. Participated with therapy.     Interval History: f/u confusion patient evaluated by psychiatry. Discussed plan of care with patient's Rastafari friend. Concerned about her living alone. He has reached out to EPS and Alzheimer's association.    Review of Systems  Objective:     Vital Signs (Most Recent):  Temp: 98.2 °F (36.8 °C) (12/26/24 1309)  Pulse: 80 (12/26/24 1524)  Resp: 16 (12/26/24 1159)  BP: 132/71 (12/26/24 1159)  SpO2: 99 % (12/26/24 1159) Vital Signs (24h Range):  Temp:  [97.7 °F (36.5 °C)-98.2 °F (36.8 °C)] 98.2 °F (36.8 °C)  Pulse:  [50-83] 80  Resp:  [16-18] 16  SpO2:  [99 %-100 %] 99 %  BP: (102-136)/(52-71) 132/71     Weight: 49.9 kg (110 lb 0.2 oz)  Body mass index is 18.88 kg/m².    Intake/Output Summary (Last 24 hours) at 12/26/2024 1636  Last data filed at 12/25/2024 1804  Gross per 24 hour   Intake 240 ml   Output --   Net 240 ml         Physical Exam  HENT:      Head: Normocephalic and atraumatic.   Cardiovascular:      Rate and Rhythm: Normal rate and regular rhythm.      Heart sounds: No murmur heard.  Pulmonary:      Effort: Pulmonary effort is normal. No respiratory distress.      Breath sounds: Normal breath sounds. No wheezing.   Abdominal:      General: Bowel sounds are normal. There is no distension.      Palpations: Abdomen is soft.      Tenderness: There is no abdominal tenderness.   Musculoskeletal:         General: No swelling.   Skin:     General: Skin is warm and dry.   Neurological:      Mental Status: She is alert. Mental status is at baseline. She is confused.             Significant Labs: All pertinent labs within the past 24 hours have been reviewed.  Significant Imaging: I have reviewed all pertinent imaging results/findings within the past 24 hours.    Assessment and Plan      * UTI (urinary tract infection)  -continue cipro  - urine culture multiple organisms      Moderate malnutrition  Nutrition consulted. Most recent weight and BMI monitored-     Measurements:  Wt Readings from Last 1 Encounters:   12/26/24 49.9 kg (110 lb 0.2 oz)   Body mass index is 18.88 kg/m².    Patient has been screened and assessed by RD.    Malnutrition Type:  Context: chronic illness  Level: moderate    Malnutrition Characteristic Summary:  Weight Loss (Malnutrition): 20% in 1 year  Energy Intake (Malnutrition): less than or equal to 75% for greater than or equal to 1 month    Interventions/Recommendations (treatment strategy):  1. General Healthful Diet  2. Commercial beverage medical food supplement therapy      Dementia  -likely  -confusional state noted in last few medical encounters  -psych evaluated  -will need safe plan for discharge.    Hypertension  Patient's blood pressure range in the last 24 hours was: BP  Min: 102/52  Max: 136/63.The patient's inpatient anti-hypertensive regimen is listed below:  Current Antihypertensives  bisoprolol-hydrochlorothiazide 2.5-6.25 mg per tablet 1 tablet, Daily, Oral    Not previously taking medication as no recent PCP follow-up.       VTE Risk Mitigation (From admission, onward)           Ordered     enoxaparin injection 40 mg  Daily         12/24/24 1720     IP VTE HIGH RISK PATIENT  Once         12/24/24 1720     Place sequential compression device  Until discontinued         12/24/24 1720                    Discharge Planning   MEJIA:      Code Status: Full Code   Medical Readiness for Discharge Date:   Discharge Plan A: Home Health                Please place Justification for DME        Veena Gibbons MD  Department of Hospital Medicine   [facility] Tele[facility] ([facility])

## 2024-12-26 NOTE — PLAN OF CARE
OT robe completed. Recommends low intensity therapy.  SPV for bed mobility. SBA for t/fs and ambulation 240ft with no AD.

## 2024-12-26 NOTE — ASSESSMENT & PLAN NOTE
Malnutrition Type:  Context: chronic illness  Level: severe    Related to (etiology):   Psychological causes     Signs and Symptoms (as evidenced by):   BMI < 22 (older adult)  Underweight with loss of fat and muscle  Unable or unwilling to eat sufficient energy/protein to maintain a healthy weight    Malnutrition Characteristic Summary:  Weight Loss (Malnutrition): 20% in 1 year  Energy Intake (Malnutrition): less than or equal to 75% for greater than or equal to 1 month  Subcutaneous Fat (Malnutrition): severe depletion  Muscle Mass (Malnutrition): severe depletion    Interventions(treatment strategy):  1. General healthful diet  2. Commercial beverage medical food supplement therapy  3. Feeding assistance management  4. Collaboration by nutrition professional with other providers    Nutrition Diagnosis Status:   Continues

## 2024-12-26 NOTE — PT/OT/SLP EVAL
Physical Therapy Evaluation and Treatment    Patient Name: Lucy Staples   MRN: [id number]  Recent Surgery: * No surgery found *      Recommendations:     Discharge Recommendations: Low Intensity Therapy   Discharge Equipment Recommendations: none   Barriers to discharge: None    Assessment:     Lucy Staples is a 79 y.o. female admitted with a medical diagnosis of UTI (urinary tract infection). She presents with the following impairments/functional limitations: weakness, impaired endurance, impaired functional mobility, gait instability, impaired balance, decreased safety awareness, decreased lower extremity function, impaired cognition.    Rehab Prognosis: Good; patient would benefit from acute PT services to address these deficits and reach maximum level of function.    Plan:     During this hospitalization, patient to be seen 3 x/week to address the above listed problems via gait training, therapeutic activities, therapeutic exercises    Plan of Care Expires: 01/09/25    Subjective     Chief Complaint: Pt is motivated to participate  Patient Comments/Goals: none stated  Pain/Comfort:  Pain Rating 1: 0/10    Social History:  Living Environment: Patient lives alone with her dog in a single story home with number of outside stair(s): 1-2  Prior Level of Function: Prior to admission, patient was independent, driving and retired, and ambulated household and community distances using no AD  Equipment Used at Home: none  DME owned (not currently used): none  Assistance Upon Discharge: friend(s)    Objective:     Communicated with nurse and epic chart review prior to session. Patient found HOB elevated with peripheral IV, telemetry (1:1 sitter) upon PT entry to room.    General Precautions: Standard, fall   Orthopedic Precautions: N/A   Braces: N/A    Respiratory Status: Room air    Exams:  Cognition: Patient is oriented to Person, Place, Not oriented to time, and Not oriented to situation  RLE ROM:  "WFL  RLE Strength:  Grossly 4/5  LLE ROM: WFL  LLE Strength:  Grossly 4/5  Sensation:    -       Intact  Skin Integrity/Edema:     -       Skin integrity: Visible skin intact    Functional Mobility:  Gait belt applied - Yes  Bed Mobility  Rolling Left: supervision  Scooting: supervision  Supine to Sit: supervision  Transfers  Sit to Stand: stand by assistance with no AD, x3 reps  Bed to Chair: stand by assistance with no AD using Step Transfer  Gait  Patient ambulated 240ft with no AD and stand by assistance. Patient demonstrates steady gait. No c/o dizziness or SOB, no gross LOB. All lines remained intact throughout ambulation trail.  Balance  Sitting: supervision  Standing: stand by assistance    Therapeutic Activities and Exercises:   Pt educated on role of PT in acute care and POC. Educated on importance of OOB activities, activity pacing, and HEP (marching/hip flex, hip abd, heel slides/LAQ, quad sets, ankle pumps) in order to maintain/regain strength. Encouraged to sit up in chair for all meals. Educated on "call don't fall" policy and increased risk of falling due to weakness, instructed to utilize call bell for assistance with all transfers. Pt agreeable to all requests.    AM-PAC 6 CLICK MOBILITY  Total Score:16    Patient left up in chair with all lines intact, call button in reach, RN notified, chair alarm on, and sitter present.    GOALS:   Multidisciplinary Problems       Physical Therapy Goals          Problem: Physical Therapy    Goal Priority Disciplines Outcome Interventions   Physical Therapy Goal     PT, PT/OT     Description: Goals to be met by 1/9/25.  1. Pt will complete bed mobility MOD I.  2. Pt will complete sit to stand MOD I.  3. Pt will ambulate 200ft MOD I.  4. Pt will increase AMPAC score by 2 points to progress functional mobility.                       History:     Past Medical History:   Diagnosis Date    History of oral surgery     Hyperlipidemia     Hypertension     IBS (irritable " bowel syndrome)     Rectocele     Recurrent UTI        Past Surgical History:   Procedure Laterality Date    HYSTERECTOMY      SINUS SURGERY         Time Tracking:     PT Received On: 12/26/24  PT Start Time: 1417  PT Stop Time: 1442  PT Total Time (min): 25 min     Billable Minutes: Evaluation 15min and Therapeutic Activity 10min    12/26/2024

## 2024-12-26 NOTE — ASSESSMENT & PLAN NOTE
-likely  -confusional state noted in last few medical encounters  -psych evaluated  -will need safe plan for discharge.

## 2024-12-26 NOTE — CONSULTS
[facility] - Telemetry ([facility])  Adult Nutrition  Consult Note    SUMMARY     Recommendations    1. Continue on a Regular, General Healthful Diet    2. Recommend commercial beverage medical food supplement therapy: Bosot TID  3. Recommend to consider addition of an appetite stimulant   4. Monitor labs and weights     Goals: Patient to consume >75% of EEN prior to RD follow up  Nutrition Goal Status: new  Communication of RD Recs: other (comment) (Consult, poc)    Assessment and Plan    Endocrine  Moderate malnutrition  Malnutrition Type:  Context: chronic illness  Level: moderate    Related to (etiology):   Inadequate energy intake     Signs and Symptoms (as evidenced by):   Weight loss   Change in appetite     Malnutrition Characteristic Summary:  Weight Loss (Malnutrition): 20% in 1 year  Energy Intake (Malnutrition): less than or equal to 75% for greater than or equal to 1 month      Interventions(treatment strategy):  1. General Healthful Diet  2. Commercial beverage medical food supplement therapy    Nutrition Diagnosis Status:   New         Malnutrition Assessment  Malnutrition Context: chronic illness  Malnutrition Level: moderate      Micronutrient Evaluation Summary: suspected deficiency   Weight Loss (Malnutrition): 20% in 1 year  Energy Intake (Malnutrition): less than or equal to 75% for greater than or equal to 1 month                         Reason for Assessment    Reason For Assessment: consult, identified at risk by screening criteria    Diagnosis: infection/sepsis  Patient Active Problem List   Diagnosis    Rectocele    UTI (urinary tract infection)    Hypertension    Dementia    Moderate malnutrition     Past Medical History:   Diagnosis Date    History of oral surgery     Hyperlipidemia     Hypertension     IBS (irritable bowel syndrome)     Rectocele     Recurrent UTI        General Information Comments:  12/26/2024:  Patient is on a regular oral diet with decreased appetite and noted decreased  "po intake of 25% at meal times.  Patient admitted with UTI and PMH of dementia.  Labs reviewed.  NKFA.  LBM: 2024.  Patient with significant weight loss within the past year.  Patient is positive for malnutrition.  RD to recommend commercial beverages: boost and to consider addtion of appetite stimulant.  RD to continue to monitor po intake and tolerance.    Nutrition Discharge Planning: General Healthful Diet    Nutrition Risk Screen     Nutrition Related Social Determinants of Health: SDOH: Unable to assess at this time.  and None Identified      Nutrition/Diet History    Spiritual, Cultural Beliefs, Confucianist Practices, Values that Affect Care: no  Food Allergies: NKFA  Factors Affecting Nutritional Intake: decreased appetite    Anthropometrics    Temp: 98.2 °F (36.8 °C)  Height Method: Stated  Height: 5' 4" (162.6 cm)  Height (inches): 64 in  Weight Method: Bed Scale  Weight: 49.9 kg (110 lb 0.2 oz)  Weight (lb): 110.01 lb  Ideal Body Weight (IBW), Female: 120 lb  % Ideal Body Weight, Female (lb): 91.68 %  BMI (Calculated): 18.9  BMI Grade: 18.5-24.9 - normal  Weight Loss: unintentional  Usual Body Weight (UBW), k kg (within 1 year)  % Usual Body Weight: 79.37  % Weight Change From Usual Weight: -20.79 %       Lab/Procedures/Meds    Pertinent Labs Reviewed: reviewed  BMP  Lab Results   Component Value Date     2024    K 3.7 2024     2024    CO2 25 2024    BUN 14 2024    CREATININE 0.8 2024    CALCIUM 9.0 2024    ANIONGAP 8 2024    EGFRNORACEVR >60 2024     No results found for: "LABA1C", "HGBA1C"  Lab Results   Component Value Date    CALCIUM 9.0 2024       Pertinent Medications Reviewed: reviewed  Scheduled Meds:   aspirin  81 mg Oral Daily    bisoprolol-hydrochlorothiazide 2.5-6.25 mg  1 tablet Oral Daily    ciprofloxacin HCl  750 mg Oral Q12H    enoxparin  40 mg Subcutaneous Daily    fluticasone propionate  2 spray Each Nostril " Daily     Continuous Infusions:  PRN Meds:.  Current Facility-Administered Medications:     dextrose 50%, 12.5 g, Intravenous, PRN    dextrose 50%, 25 g, Intravenous, PRN    glucagon (human recombinant), 1 mg, Intramuscular, PRN    glucose, 16 g, Oral, PRN    glucose, 24 g, Oral, PRN    naloxone, 0.02 mg, Intravenous, PRN    sodium chloride 0.9%, 10 mL, Intravenous, Q12H PRN    Physical Findings/Assessment         Estimated/Assessed Needs    Weight Used For Calorie Calculations: 49.9 kg (110 lb 0.2 oz)  Energy Calorie Requirements (kcal): 25-35kcals/kg (1248-1747kcals/day)  Energy Need Method: Kcal/kg  Protein Requirements: 1.2-1.5g/kg (60-75g/day)  Weight Used For Protein Calculations: 49.9 kg (110 lb 0.2 oz)  Fluid Requirements (mL): 1ml/kcal  Estimated Fluid Requirement Method: RDA Method  RDA Method (mL): 25  CHO Requirement: 225-250      Nutrition Prescription Ordered    Current Diet Order: Regular  Oral Nutrition Supplement: Boost    Evaluation of Received Nutrient/Fluid Intake    % Kcal Needs: 25%  % Protein Needs: 25%  I/O: 12/26/2024: +1233mls since admit  Energy Calories Required: not meeting needs  Protein Required: not meeting needs  Fluid Required: not meeting needs  Comments: LBM: 12/23/2024  Tolerance: tolerating  % Intake of Estimated Energy Needs: 25 - 50 %  % Meal Intake: 25 - 50 %    Nutrition Risk    Level of Risk/Frequency of Follow-up: moderate (Follow up: 1-2x per week)       Monitor and Evaluation    Food and Nutrient Intake: energy intake, food and beverage intake  Food and Nutrient Adminstration: diet order  Anthropometric Measurements: height/length, weight, weight change, body mass index  Biochemical Data, Medical Tests and Procedures: electrolyte and renal panel, gastrointestinal profile, glucose/endocrine profile, inflammatory profile, lipid profile       Nutrition Follow-Up    RD Follow-up?: Yes  Isabelle Steinberg, MS, RDN, LDN

## 2024-12-26 NOTE — PLAN OF CARE
A241/A241 SVITLANACally Staples is a 79 y.o.female admitted on 12/24/2024 for UTI (urinary tract infection)   Code Status: Full Code MRN: [id number]   Review of patient's allergies indicates:   Allergen Reactions    Pcn [penicillins] Hives     Past Medical History:   Diagnosis Date    History of oral surgery     Hyperlipidemia     Hypertension     IBS (irritable bowel syndrome)     Rectocele     Recurrent UTI       PRN meds    dextrose 50%, 12.5 g, PRN  dextrose 50%, 25 g, PRN  glucagon (human recombinant), 1 mg, PRN  glucose, 16 g, PRN  glucose, 24 g, PRN  naloxone, 0.02 mg, PRN  sodium chloride 0.9%, 10 mL, Q12H PRN      Chart check completed. Will continue plan of care.      Orientation: disoriented to, place, time, situation  Loring Coma Scale Score: 14     Lead Monitored: Lead II Rhythm: normal sinus rhythm    Cardiac/Telemetry Box Number: [id number]  VTE Core Measure: (SCDs) Sequential compression device initiated/maintained Last Bowel Movement: 12/23/24  Diet Adult Regular  Voiding Characteristics: voids spontaneously without difficulty  Kraig Score: 17  Fall Risk Score: 9  Accucheck []   Freq?      Lines/Drains/Airways       Peripheral Intravenous Line  Duration                  Peripheral IV - Single Lumen 12/24/24 1300 20 G Anterior;Proximal;Right Forearm 1 day

## 2024-12-26 NOTE — PLAN OF CARE
PT EVAL complete. Required SPV for bed mobility, ambulated 240ft SBA, no AD. Recommending low intensity therapy upon d/c.

## 2024-12-26 NOTE — ASSESSMENT & PLAN NOTE
Nutrition consulted. Most recent weight and BMI monitored-     Measurements:  Wt Readings from Last 1 Encounters:   12/26/24 49.9 kg (110 lb 0.2 oz)   Body mass index is 18.88 kg/m².    Patient has been screened and assessed by RD.    Malnutrition Type:  Context: chronic illness  Level: moderate    Malnutrition Characteristic Summary:  Weight Loss (Malnutrition): 20% in 1 year  Energy Intake (Malnutrition): less than or equal to 75% for greater than or equal to 1 month    Interventions/Recommendations (treatment strategy):  1. General Healthful Diet  2. Commercial beverage medical food supplement therapy

## 2024-12-26 NOTE — PROGRESS NOTES
Pharmacist Intervention IV to PO Note    Lucy Staples is a 79 y.o. female being treated with IV medication ciprofloxacin    Patient Data:    Vital Signs (Most Recent):  Temp: 97.9 °F (36.6 °C) (12/25/24 1613)  Pulse: (!) 57 (12/25/24 1700)  Resp: 18 (12/25/24 1613)  BP: (!) 149/67 (12/25/24 1613)  SpO2: 100 % (12/25/24 1613) Vital Signs (72h Range):  Temp:  [97.7 °F (36.5 °C)-98.8 °F (37.1 °C)]   Pulse:  [57-97]   Resp:  [16-18]   BP: (120-172)/(67-87)   SpO2:  [97 %-100 %]      CBC:  Recent Labs   Lab 12/24/24  1306 12/25/24  0456   WBC 6.34 5.79   RBC 4.23 3.87*   HGB 12.9 11.9*   HCT 39.4 36.4*    232   MCV 93 94   MCH 30.5 30.7   MCHC 32.7 32.7     CMP:     Recent Labs   Lab 12/24/24  1306 12/25/24  0456   GLU 96 92   CALCIUM 10.0 9.0   ALBUMIN 4.4  --    PROT 7.8  --     138   K 4.0 3.7   CO2 23 25    105   BUN 17 14   CREATININE 0.8 0.8   ALKPHOS 56  --    ALT 22  --    AST 30  --    BILITOT 0.8  --        Dietary Orders:  Diet Orders            Diet Adult Regular: Regular starting at 12/24 1720            Based on the following criteria, this patient qualifies for intravenous to oral conversion:  [x] The patients gastrointestinal tract is functioning (tolerating medications via oral or enteral route for 24 hours and tolerating food or enteral feeds for 24 hours).  [x] The patient is hemodynamically stable for 24 hours (heart rate <100 beats per minute, systolic blood pressure >99 mm Hg, and respiratory rate <20 breaths per minute).  [x] The patient shows clinical improvement (afebrile for at least 24 hours and white blood cell count downtrending or normalized). Additionally, the patient must be non-neutropenic (absolute neutrophil count >500 cells/mm3).  [x] For antimicrobials, the patient has received IV therapy for at least 24 hours.    IV medication Ciprofloxacin will be changed to oral medication ciprofloxacin.     Pharmacist's Name: Yana Melo PharmD  Pharmacist's  Extension: [phone]    Thank you for allowing us to participate in this patient's care.     Yana Melo PharmD 12/25/2024 7:21 PM

## 2024-12-26 NOTE — PLAN OF CARE
Nutrition Plan of Care:    Recommendations     1. Continue on a Regular, General Healthful Diet    2. Recommend commercial beverage medical food supplement therapy: Bosot TID  3. Recommend to consider addition of an appetite stimulant   4. Monitor labs and weights      Goals: Patient to consume >75% of EEN prior to RD follow up  Nutrition Goal Status: new  Communication of RD Recs: other (comment) (Consult, poc)     Assessment and Plan     Endocrine  Moderate malnutrition  Malnutrition Type:  Context: chronic illness  Level: moderate     Related to (etiology):   Inadequate energy intake      Signs and Symptoms (as evidenced by):   Weight loss   Change in appetite      Malnutrition Characteristic Summary:  Weight Loss (Malnutrition): 20% in 1 year  Energy Intake (Malnutrition): less than or equal to 75% for greater than or equal to 1 month        Interventions(treatment strategy):  1. General Healthful Diet  2. Commercial beverage medical food supplement therapy     Nutrition Diagnosis Status:   German Steinberg, MS, RDN, LDN

## 2024-12-26 NOTE — PLAN OF CARE
[facility] - Telemetry (Hospital)  Initial Discharge Assessment       Primary Care Provider: Lamin Webber MD - Debbie Long NP    Admission Diagnosis: Acute cystitis without hematuria [N30.00]  AMS (altered mental status) [R41.82]    Admission Date: 12/24/2024  Expected Discharge Date: Per Attending    Transition of Care Barriers: Social    Payor: HU[employer] MANAGED MEDICARE / Plan: HU[employer] MEDICARE HMO / Product Type: Capitation /     Extended Emergency Contact Information  Primary Emergency Contact: noe willard  Mobile Phone: [phone]  Relation: Neighbor  Preferred language: English   needed? No  Secondary Emergency Contact: Kaushal Leon  Address: [address] [place] [address]            [place] [address] [facility]  Home Phone: [phone]  Mobile Phone: [phone]  Relation: Spouse    Discharge Plan A: [id number] - [place], [place] - [address] [facility] [address] [place] [address] [id number] [address] [address]  Phone: [phone] Fax: [phone]      Initial Assessment (most recent)       Adult Discharge Assessment - 12/26/24 1241          Discharge Assessment    Assessment Type Discharge Planning Assessment     Confirmed/corrected address, phone number and insurance Yes     Confirmed Demographics Correct on Facesheet     Source of Information other (see comments)   Dominick Ambriz - Evangelical Friend    When was your last doctors appointment? 11/01/24   Yomi Neal MD - [facility]    Communicated MEJIA with patient/caregiver Date not available/Unable to determine     Reason For Admission UTI (urinary tract infection)     People in Home alone     Facility Arrived From: home     Do you expect to return to your current living situation? Yes     Do you have help at home or someone to help you manage your care at home? Yes     Who are your caregiver(s) and their phone number(s)? neighbors and Yazidi friends      Walking or Climbing Stairs Difficulty no     Dressing/Bathing Difficulty no     Home Accessibility wheelchair accessible     Equipment Currently Used at Home none     Readmission within 30 days? No     Patient currently being followed by outpatient case management? No     Do you currently have service(s) that help you manage your care at home? No     Do you take prescription medications? Yes     Do you have prescription coverage? Yes     Coverage [employer] Managed Medicare     Do you have any problems affording any of your prescribed medications? No     Is the patient taking medications as prescribed? yes     Who is going to help you get home at discharge? neighbors and Uatsdin friends     How do you get to doctors appointments? car, drives self;family or friend will provide     Are you on dialysis? No     Do you take coumadin? No     Discharge Plan A Home Health     DME Needed Upon Discharge  none     Discharge Plan discussed with: Friend     Name(s) and Number(s) Dominick Loco     Transition of Care Barriers Social                   Anticipated DC dispo: Home Health   Prior Level of Function: independent with ADLs  People in home: alone    Comments:  SW spoke to Patient's friend, Dominick Loco, over the phone to introduce role and discuss discharge planning. Patient's numerous friends and neighbors will be help at home and can provide transport at time of discharge. Confirmed demographics, insurance, and emergency contacts.   SW meet with patient and additional friends, Emy and Jerri, at bedside to introduce role and updated Patient's whiteboard with contact information and anticipated DC plan. CM discharge needs depends on hospital progress. SW will continue following to assist with other needs.     SW and Patient's friend Dominick spoke, at length, regarding Patient's home situation and friends/ neighbors concerns. Patient's friend, Dominick, expressed the neighbors/ friends concerns regarding patient returning home alone. Patient  has not seen a doctor regularly in over 2 years and no longer has her prescription medications. Dominick stated patient has also been driving with an  licenses and they are concerned about the Patient's memory issues.   Patient's friend stated Patient does have a daughter and sister, however when he contacted them, they do not want anything to do with the Patient and are not willing to help or get involved at this time. Patient's spouse also passed away in , however Patient's friend stated he and the other neighbors/ friends would be willing to help in any way they could regarding the Patient's care. SW stated that Psychiatry and Physical/ Occupational therapy has been consulted to see patient and we would wait for their recommendations prior to discussing any discharge plan. Patient's friend verbalized understanding.

## 2024-12-26 NOTE — ASSESSMENT & PLAN NOTE
Patient's blood pressure range in the last 24 hours was: BP  Min: 102/52  Max: 136/63.The patient's inpatient anti-hypertensive regimen is listed below:  Current Antihypertensives  bisoprolol-hydrochlorothiazide 2.5-6.25 mg per tablet 1 tablet, Daily, Oral    Not previously taking medication as no recent PCP follow-up.

## 2024-12-27 LAB
BACTERIA #/AREA URNS HPF: ABNORMAL /HPF
BILIRUB UR QL STRIP: NEGATIVE
CLARITY UR: ABNORMAL
COLOR UR: COLORLESS
GLUCOSE UR QL STRIP: NEGATIVE
HGB UR QL STRIP: ABNORMAL
KETONES UR QL STRIP: NEGATIVE
LEUKOCYTE ESTERASE UR QL STRIP: ABNORMAL
MICROSCOPIC COMMENT: ABNORMAL
NITRITE UR QL STRIP: NEGATIVE
PH UR STRIP: 5 [PH] (ref 5–8)
PROT UR QL STRIP: NEGATIVE
RBC #/AREA URNS HPF: 10 /HPF (ref 0–4)
SP GR UR STRIP: 1 (ref 1–1.03)
SQUAMOUS #/AREA URNS HPF: 8 /HPF
URN SPEC COLLECT METH UR: ABNORMAL
UROBILINOGEN UR STRIP-ACNC: NEGATIVE EU/DL
WBC #/AREA URNS HPF: 62 /HPF (ref 0–5)

## 2024-12-27 PROCEDURE — 63600175 PHARM REV CODE 636 W HCPCS: Performed by: STUDENT IN AN ORGANIZED HEALTH CARE EDUCATION/TRAINING PROGRAM

## 2024-12-27 PROCEDURE — G0378 HOSPITAL OBSERVATION PER HR: HCPCS

## 2024-12-27 PROCEDURE — 25000003 PHARM REV CODE 250: Performed by: STUDENT IN AN ORGANIZED HEALTH CARE EDUCATION/TRAINING PROGRAM

## 2024-12-27 PROCEDURE — 81000 URINALYSIS NONAUTO W/SCOPE: CPT | Performed by: INTERNAL MEDICINE

## 2024-12-27 PROCEDURE — 97530 THERAPEUTIC ACTIVITIES: CPT

## 2024-12-27 PROCEDURE — 96372 THER/PROPH/DIAG INJ SC/IM: CPT | Performed by: STUDENT IN AN ORGANIZED HEALTH CARE EDUCATION/TRAINING PROGRAM

## 2024-12-27 PROCEDURE — 97116 GAIT TRAINING THERAPY: CPT | Mod: CQ

## 2024-12-27 PROCEDURE — 25000003 PHARM REV CODE 250: Performed by: INTERNAL MEDICINE

## 2024-12-27 RX ADMIN — ENOXAPARIN SODIUM 40 MG: 40 INJECTION SUBCUTANEOUS at 06:12

## 2024-12-27 RX ADMIN — FLUTICASONE PROPIONATE 100 MCG: 50 SPRAY, METERED NASAL at 09:12

## 2024-12-27 RX ADMIN — BISOPROLOL FUMARATE AND HYDROCHLOROTHIAZIDE 1 TABLET: 6.25; 2.5 TABLET ORAL at 09:12

## 2024-12-27 RX ADMIN — CIPROFLOXACIN HYDROCHLORIDE 750 MG: 750 TABLET, FILM COATED ORAL at 09:12

## 2024-12-27 RX ADMIN — ASPIRIN 81 MG: 81 TABLET, COATED ORAL at 09:12

## 2024-12-27 NOTE — PT/OT/SLP PROGRESS
"Occupational Therapy   Treatment    Name: Lucy Staples  MRN: [id number]  Admitting Diagnosis:  UTI (urinary tract infection)       Recommendations:     Discharge Recommendations: Low Intensity Therapy (24 HR supervision)  Discharge Equipment Recommendations:  none  Barriers to discharge:  None    Assessment:     Lucy Staples is a 79 y.o. female with a medical diagnosis of UTI (urinary tract infection).  She presents with persistent confusion and perseveration. Performance deficits affecting function are weakness, impaired endurance, impaired self care skills, impaired functional mobility, gait instability, impaired balance, impaired cognition, decreased lower extremity function, decreased safety awareness.     Rehab Prognosis:  Good; patient would benefit from acute skilled OT services to address these deficits and reach maximum level of function.       Plan:     Patient to be seen 2 x/week to address the above listed problems via self-care/home management, therapeutic activities, therapeutic exercises  Plan of Care Expires: 01/09/25  Plan of Care Reviewed with: patient    Subjective     Chief Complaint: "Look how clean these floors are." "Y'all fixed me." Multiple times each comment repeated.  Patient/Family Comments/goals: To return home to care for her dog.  Pain/Comfort:  Pain Rating 1: 0/10    Objective:     Communicated with: nurse prior to session.  Patient found HOB elevated with peripheral IV, telemetry (AVASYS; sitter) upon OT entry to room.    General Precautions: Standard, fall    Orthopedic Precautions:N/A  Braces: N/A  Respiratory Status: Room air     Occupational Performance:     Bed Mobility:    Patient completed Scooting/Bridging with independence  Patient completed Supine to Sit with independence     Functional Mobility/Transfers:  Patient completed Sit <> Stand Transfer with contact guard assistance  with  no assistive device and verbal cuing   Patient completed Bed <> Chair " Transfer using Step Transfer technique with contact guard assistance with no assistive device and minimal verbal cuing  Functional Mobility: Pt ambulating with SBA and no AD in milan and room.     Activities of Daily Living:  Upper Body Dressing: minimum assistance orientation of shirt  Lower Body Dressing: stand by assistance shoes      AMPAC 6 Click ADL: 20      Patient left up in chair with all lines intact, call button in reach, chair alarm on, and sitter present    GOALS:   Multidisciplinary Problems       Occupational Therapy Goals          Problem: Occupational Therapy    Goal Priority Disciplines Outcome Interventions   Occupational Therapy Goal     OT, PT/OT Progressing    Description: Goals to be met by: 1/9/25     Patient will increase functional independence with ADLs by performing:    UE Dressing with [place].  Grooming while standing at sink with [place].  Toileting from toilet with [place] for hygiene and clothing management.   Toilet transfer to toilet with [place].  Upper extremity exercise program x15 reps per handout, with independence.                         Time Tracking:     OT Date of Treatment: 12/27/24  OT Start Time: 1100  OT Stop Time: 1110  OT Total Time (min): 10 min    Billable Minutes:Therapeutic Activity 10    OT/INDIO: OT     Number of INDIO visits since last OT visit: 0    12/27/2024

## 2024-12-27 NOTE — PLAN OF CARE
SW received a call from Patient's friend, Dominick Loco, requesting an update on Patient's care. SW stated that patient was evaluated by our Psychiatric department and was deemed to not have capacity to make decisions for herself. [facility]AL stated that we were able to get the form from our legal department that would xavier a friend/ neighbor the power to make decision for Patient. Patient's friend, Dominick Loco, stated that he and the Patient's neighbors have spoken and decided that Dominick would be the one to make decisions with, and on behalf of the Patient.   SW verbalized understanding and stated she can email the form for patient's friend to sign.  Patient's friend verbalized understanding and inquired about next steps for Patient's care.  SW stated that Patient was evaluated by our therapy department and they do recommended, Low Intense Therapy - as long as patient would have 24/7 care at home. Patient's friend, Dominick, stated that the neighbors and friends can check in on Patient, however patient would not be guaranteed to have 24/7 care/ supervision at all times. [facility]UPAL stated that Attending is agreeable to starting Skilled Nursing Facility Placement for Patient that could then lead to residential, permanent nursing home placement for Patient. Patient's friend verbalized understanding and inquired about time line for discharge, etc.   SW stated she would send referrals out to check for bed availability and accommodations to the facilities on the list that was emailed to Patient's friend. SW stated she would touch base and be in contact with Patient's friend next week to follow up and get Patient placed for therapy. Patient's friend verbalized understanding and stated he would sign form and send back to RUPAL.   Patient's friend also inquired about [facility] access for patient. SW was able to xavier access to Patient's friend to be able to access Patient's [facility].     RUPAL will send out SNF referrals and pending acceptance. [facility]UPAL will follow  up next week.     16 05 PASRR/ 142 pending.     SW will continue to follow and assist as needed.

## 2024-12-27 NOTE — PLAN OF CARE
Persistent confusion and perseveration. Ambulating standby assistance with no assistive device. Min A UB dressing for shirt orientation. Recommending 24hr supervision.

## 2024-12-27 NOTE — PT/OT/SLP PROGRESS
Physical Therapy  Treatment    Lucy Staples   MRN: [id number]   Admitting Diagnosis: UTI (urinary tract infection)    PT Received On: 12/27/24  PT Start Time: 1055     PT Stop Time: 1110    PT Total Time (min): 15 min       Billable Minutes:  Gait Training 15    Treatment Type: Treatment  PT/PTA: PTA     Number of PTA visits since last PT visit: 1       General Precautions: Standard, fall  Orthopedic Precautions: N/A  Braces: N/A  Respiratory Status: Room air    Spiritual, Cultural Beliefs, Yazidi Practices, Values that Affect Care: no    Subjective:  Communicated with patient's nurse and completed Epic chart review prior to session.  Patient agreed to PT session.     Pain/Comfort  Pain Rating 1: 0/10  Pain Rating Post-Intervention 1: 0/10    Objective:   Patient found with: peripheral IV, telemetry, Other (comments) (SITTER)    Supine > sit EOB: Modified Independent    Forward scoot towards EOB: Modified Independent    STS from EOB No AD: SBA    300ft No AD SBA    Stand pivot T/F to chair No AD: SBA    Educated patient on importance of increased tolerance to upright position and direct impact on CV endurance and strength. Patient encouraged to sit up in chair/ EOB, for a minimum of 2 consecutive hours, 3x per day. Encouraged patient to perform AROM TE to BLE throughout the day within all available planes of motion. Re enforced importance of utilizing call light to meet needs in room and not attempt to get up without staff assistance. Patient verbalized understanding and agreed to comply.       AM-PAC 6 CLICK MOBILITY  How much help from another person does this patient currently need?   1 = Unable, Total/Dependent Assistance  2 = A lot, Maximum/Moderate Assistance  3 = A little, Minimum/Contact Guard/Supervision  4 = None, Modified [place]/Independent    Turning over in bed (including adjusting bedclothes, sheets and blankets)?: 4  Sitting down on and standing up from a chair with arms (e.g.,  wheelchair, bedside commode, etc.): 3  Moving from lying on back to sitting on the side of the bed?: 4  Moving to and from a bed to a chair (including a wheelchair)?: 3  Need to walk in hospital room?: 3  Climbing 3-5 steps with a railing?: 1 (NT)  Basic Mobility Total Score: 18    AM-PAC Raw Score CMS G-Code Modifier Level of Impairment Assistance   6 % Total / Unable   7 - 9 CM 80 - 100% Maximal Assist   10 - 14 CL 60 - 80% Moderate Assist   15 - 19 CK 40 - 60% Moderate Assist   20 - 22 CJ 20 - 40% Minimal Assist   23 CI 1-20% SBA / CGA   24 CH 0% Independent/ Mod I     Patient left up in chair with call button in reach, chair alarm on, and sitter present.    Assessment:  Lucy Staples is a 79 y.o. female with a medical diagnosis of UTI (urinary tract infection) and presents with overall decline in functional mobility. Patient would continue to benefit from skilled PT to address functional limitations listed below in order to return to PLOF/decrease caregiver burden.     Rehab identified problem list/impairments: weakness, impaired self care skills, impaired functional mobility, gait instability, impaired balance, decreased safety awareness    Rehab potential is good.    Activity tolerance: Fair    Discharge recommendations: Low Intensity Therapy      Barriers to discharge:      Equipment recommendations: none     GOALS:   Multidisciplinary Problems       Physical Therapy Goals          Problem: Physical Therapy    Goal Priority Disciplines Outcome Interventions   Physical Therapy Goal     PT, PT/OT     Description: Goals to be met by 1/9/25.  1. Pt will complete bed mobility MOD I.  2. Pt will complete sit to stand MOD I.  3. Pt will ambulate 200ft MOD I.  4. Pt will increase AMPAC score by 2 points to progress functional mobility.                       PLAN:    Patient to be seen 3 x/week to address the above listed problems via gait training, therapeutic activities, therapeutic  exercises  Plan of Care expires: 01/09/25  Plan of Care reviewed with: patient         12/27/2024

## 2024-12-27 NOTE — PROGRESS NOTES
[facility] Medicine  Progress Note    Patient Name: Lucy Staples  MRN: [id number]  Patient Class: OP- Observation   Admission Date: 2024  Length of Stay: 0 days  Attending Physician: Veena Gibbons MD  Primary Care Provider: Lamin Webber MD        Subjective     Principal Problem:UTI (urinary tract infection)        HPI:  This is an 79-year-old woman who lives alone with hypertension, hyperlipidemia who was confused and called the police department, ultimately ended up in the hospital and was found to have evidence of a UTI     Patient is accompanied by her friend from [facility], Dominick Loco ([phone]) who explains patient has a good support network with friends and neighbors who help to take care of her at home by checking on her intermittently.  It is unclear what happened today, the patient does not remember, but per the ED notes the patient could not locate her keys and called the police department who then called for an ambulance.  The patient is asymptomatic at this time and of note denies dysuria or urinary frequency.  She states that she still drives and then explained that her license has .      It sounds like the home situation may not be tenable anymore as the patient likely has undiagnosed dementia and difficulty caring for herself.  Does have a PCP but does not see her regularly.  On the day of appointments she gets worried and then does not go.  Been and her friends have been trying to get her to see the doctor unsuccessfully.      In the ED, vitals notable for some hypertension with blood pressure is 160/70s.  CBC and CMP were normal.  Urinalysis with 11 WBCs per high-power field and some leukocyte esterase.  She was diagnosed with a urinary tract infection and referred for admission.  She was started on ciprofloxacin    Overview/Hospital Course:  The patient presented to the ED after calling the police. She was noted to have confusion. Police  department called EMS. Workup consistent with UTI. Patient placed on empiric antibiotics. Psychiatry consulted as patient does not appear to have capacity. Urine culture with multiple organisms. Repeat UA. Participated with therapy. 24 hour supervision recommended. Patient does not have 24 hour supervision. She has friends who check on her but are unable to provide 24 hour supervision. Skilled placement being pursued.     Interval History: f/u confusion encourage oral intake as patient has lost 30 lbs in the last year. Friends are unable to provide 24 hour supervision. Case management consulted for placement.    Review of Systems  Objective:     Vital Signs (Most Recent):  Temp: 97.6 °F (36.4 °C) (12/27/24 1504)  Pulse: 76 (12/27/24 1504)  Resp: 17 (12/27/24 1504)  BP: 132/83 (12/27/24 1504)  SpO2: 99 % (12/27/24 1504) Vital Signs (24h Range):  Temp:  [97.6 °F (36.4 °C)-99.1 °F (37.3 °C)] 97.6 °F (36.4 °C)  Pulse:  [52-86] 76  Resp:  [16-17] 17  SpO2:  [97 %-99 %] 99 %  BP: (119-148)/(62-83) 132/83     Weight: 49.9 kg (110 lb 0.2 oz)  Body mass index is 18.88 kg/m².    Intake/Output Summary (Last 24 hours) at 12/27/2024 1639  Last data filed at 12/27/2024 1000  Gross per 24 hour   Intake 590 ml   Output --   Net 590 ml         Physical Exam  HENT:      Head: Normocephalic and atraumatic.   Cardiovascular:      Rate and Rhythm: Normal rate and regular rhythm.      Heart sounds: No murmur heard.  Pulmonary:      Effort: Pulmonary effort is normal. No respiratory distress.      Breath sounds: Normal breath sounds. No wheezing.   Abdominal:      General: Bowel sounds are normal. There is no distension.      Palpations: Abdomen is soft.      Tenderness: There is no abdominal tenderness.   Musculoskeletal:         General: No swelling.   Skin:     General: Skin is warm and dry.   Neurological:      Mental Status: She is alert. Mental status is at baseline.             Significant Labs: All pertinent labs within the past  24 hours have been reviewed.  Recent Lab Results       None            Significant Imaging: I have reviewed all pertinent imaging results/findings within the past 24 hours.    Assessment and Plan     * UTI (urinary tract infection)  -continue cipro  - urine culture multiple organisms  -repeat UA pending      Moderate malnutrition  Nutrition consulted. Most recent weight and BMI monitored-     Measurements:  Wt Readings from Last 1 Encounters:   12/26/24 49.9 kg (110 lb 0.2 oz)   Body mass index is 18.88 kg/m².    Patient has been screened and assessed by RD.    Malnutrition Type:  Context: chronic illness  Level: moderate    Malnutrition Characteristic Summary:  Weight Loss (Malnutrition): 20% in 1 year  Energy Intake (Malnutrition): less than or equal to 75% for greater than or equal to 1 month    Interventions/Recommendations (treatment strategy):  1. General Healthful Diet  2. Commercial beverage medical food supplement therapy      Dementia  -likely  -confusional state noted in last few medical encounters  -psych evaluated, lacks capacity  -will need safe plan for discharge. Discussed with friends. Consulted case management for skilled placement    Hypertension  Patient's blood pressure range in the last 24 hours was: BP  Min: 119/62  Max: 148/69.The patient's inpatient anti-hypertensive regimen is listed below:  Current Antihypertensives  bisoprolol-hydrochlorothiazide 2.5-6.25 mg per tablet 1 tablet, Daily, Oral    Not previously taking medication as no recent PCP follow-up.       VTE Risk Mitigation (From admission, onward)           Ordered     enoxaparin injection 40 mg  Daily         12/24/24 1720     IP VTE HIGH RISK PATIENT  Once         12/24/24 1720     Place sequential compression device  Until discontinued         12/24/24 1720                    Discharge Planning   MEJIA:      Code Status: Full Code   Medical Readiness for Discharge Date:   Discharge Plan A: Home Health                Please place  Justification for DME        Veena Gibbons MD  Department of Hospital Medicine   [facility] - Telemetry ([facility])

## 2024-12-27 NOTE — PLAN OF CARE
POC reviewed w/ patient. Pt verbalizes understanding of plan  Chart/Orders reviewed  All safety precautions in place; No falls this shift  Pt resting in bed  Sitter at bedside to reorient pt  Continuous rounding c bed in lowest position, side rails up, call light in reach  Will continue to monitor until the end of shift    Problem: Adult Inpatient Plan of Care  Goal: Plan of Care Review  Outcome: Progressing  Flowsheets (Taken 12/27/2024 0211)  Plan of Care Reviewed With: patient  Goal: Patient-Specific Goal (Individualized)  Outcome: Progressing  Flowsheets (Taken 12/27/2024 0211)  Individualized Care Needs: reorient pt  Anxieties, Fears or Concerns: n/a  Patient/Family-Specific Goals (Include Timeframe): snf placement by end of admission  Goal: Readiness for Transition of Care  Outcome: Progressing     Problem: Fall Injury Risk  Goal: Absence of Fall and Fall-Related Injury  Outcome: Progressing     Problem: UTI (Urinary Tract Infection)  Goal: Improved Infection Symptoms  Outcome: Progressing

## 2024-12-27 NOTE — ASSESSMENT & PLAN NOTE
Patient's blood pressure range in the last 24 hours was: BP  Min: 119/62  Max: 148/69.The patient's inpatient anti-hypertensive regimen is listed below:  Current Antihypertensives  bisoprolol-hydrochlorothiazide 2.5-6.25 mg per tablet 1 tablet, Daily, Oral    Not previously taking medication as no recent PCP follow-up.

## 2024-12-27 NOTE — ASSESSMENT & PLAN NOTE
-likely  -confusional state noted in last few medical encounters  -psych evaluated, lacks capacity  -will need safe plan for discharge. Discussed with friends. Consulted case management for skilled placement

## 2024-12-27 NOTE — SUBJECTIVE & OBJECTIVE
Interval History: f/u confusion encourage oral intake as patient has lost 30 lbs in the last year. Friends are unable to provide 24 hour supervision. Case management consulted for placement.    Review of Systems  Objective:     Vital Signs (Most Recent):  Temp: 97.6 °F (36.4 °C) (12/27/24 1504)  Pulse: 76 (12/27/24 1504)  Resp: 17 (12/27/24 1504)  BP: 132/83 (12/27/24 1504)  SpO2: 99 % (12/27/24 1504) Vital Signs (24h Range):  Temp:  [97.6 °F (36.4 °C)-99.1 °F (37.3 °C)] 97.6 °F (36.4 °C)  Pulse:  [52-86] 76  Resp:  [16-17] 17  SpO2:  [97 %-99 %] 99 %  BP: (119-148)/(62-83) 132/83     Weight: 49.9 kg (110 lb 0.2 oz)  Body mass index is 18.88 kg/m².    Intake/Output Summary (Last 24 hours) at 12/27/2024 1639  Last data filed at 12/27/2024 1000  Gross per 24 hour   Intake 590 ml   Output --   Net 590 ml         Physical Exam  HENT:      Head: Normocephalic and atraumatic.   Cardiovascular:      Rate and Rhythm: Normal rate and regular rhythm.      Heart sounds: No murmur heard.  Pulmonary:      Effort: Pulmonary effort is normal. No respiratory distress.      Breath sounds: Normal breath sounds. No wheezing.   Abdominal:      General: Bowel sounds are normal. There is no distension.      Palpations: Abdomen is soft.      Tenderness: There is no abdominal tenderness.   Musculoskeletal:         General: No swelling.   Skin:     General: Skin is warm and dry.   Neurological:      Mental Status: She is alert. Mental status is at baseline.             Significant Labs: All pertinent labs within the past 24 hours have been reviewed.  Recent Lab Results       None            Significant Imaging: I have reviewed all pertinent imaging results/findings within the past 24 hours.

## 2024-12-28 PROCEDURE — 25000003 PHARM REV CODE 250: Performed by: STUDENT IN AN ORGANIZED HEALTH CARE EDUCATION/TRAINING PROGRAM

## 2024-12-28 PROCEDURE — 63600175 PHARM REV CODE 636 W HCPCS: Performed by: STUDENT IN AN ORGANIZED HEALTH CARE EDUCATION/TRAINING PROGRAM

## 2024-12-28 PROCEDURE — G0378 HOSPITAL OBSERVATION PER HR: HCPCS

## 2024-12-28 PROCEDURE — 96372 THER/PROPH/DIAG INJ SC/IM: CPT | Performed by: STUDENT IN AN ORGANIZED HEALTH CARE EDUCATION/TRAINING PROGRAM

## 2024-12-28 PROCEDURE — 25000003 PHARM REV CODE 250: Performed by: HOSPITALIST

## 2024-12-28 PROCEDURE — 25000003 PHARM REV CODE 250: Performed by: INTERNAL MEDICINE

## 2024-12-28 RX ORDER — TALC
6 POWDER (GRAM) TOPICAL NIGHTLY PRN
Status: DISCONTINUED | OUTPATIENT
Start: 2024-12-28 | End: 2025-01-10 | Stop reason: HOSPADM

## 2024-12-28 RX ORDER — SULFAMETHOXAZOLE AND TRIMETHOPRIM 800; 160 MG/1; MG/1
1 TABLET ORAL 2 TIMES DAILY
Status: DISCONTINUED | OUTPATIENT
Start: 2024-12-28 | End: 2024-12-31

## 2024-12-28 RX ADMIN — SULFAMETHOXAZOLE AND TRIMETHOPRIM 1 TABLET: 800; 160 TABLET ORAL at 09:12

## 2024-12-28 RX ADMIN — ENOXAPARIN SODIUM 40 MG: 40 INJECTION SUBCUTANEOUS at 04:12

## 2024-12-28 RX ADMIN — BISOPROLOL FUMARATE AND HYDROCHLOROTHIAZIDE 1 TABLET: 6.25; 2.5 TABLET ORAL at 10:12

## 2024-12-28 RX ADMIN — SULFAMETHOXAZOLE AND TRIMETHOPRIM 1 TABLET: 800; 160 TABLET ORAL at 11:12

## 2024-12-28 RX ADMIN — FLUTICASONE PROPIONATE 100 MCG: 50 SPRAY, METERED NASAL at 10:12

## 2024-12-28 RX ADMIN — ASPIRIN 81 MG: 81 TABLET, COATED ORAL at 10:12

## 2024-12-28 RX ADMIN — MELATONIN TAB 3 MG 6 MG: 3 TAB at 09:12

## 2024-12-28 RX ADMIN — CIPROFLOXACIN HYDROCHLORIDE 750 MG: 750 TABLET, FILM COATED ORAL at 10:12

## 2024-12-28 NOTE — PROGRESS NOTES
[facility] Medicine  Progress Note    Patient Name: Lucy Staples  MRN: [id number]  Patient Class: OP- Observation   Admission Date: 2024  Length of Stay: 0 days  Attending Physician: Veena Gibbons MD  Primary Care Provider: Lamin Webber MD        Subjective     Principal Problem:UTI (urinary tract infection)        HPI:  This is an 79-year-old woman who lives alone with hypertension, hyperlipidemia who was confused and called the police department, ultimately ended up in the hospital and was found to have evidence of a UTI     Patient is accompanied by her friend from [facility], Dominick Loco ([phone]) who explains patient has a good support network with friends and neighbors who help to take care of her at home by checking on her intermittently.  It is unclear what happened today, the patient does not remember, but per the ED notes the patient could not locate her keys and called the police department who then called for an ambulance.  The patient is asymptomatic at this time and of note denies dysuria or urinary frequency.  She states that she still drives and then explained that her license has .      It sounds like the home situation may not be tenable anymore as the patient likely has undiagnosed dementia and difficulty caring for herself.  Does have a PCP but does not see her regularly.  On the day of appointments she gets worried and then does not go.  Been and her friends have been trying to get her to see the doctor unsuccessfully.      In the ED, vitals notable for some hypertension with blood pressure is 160/70s.  CBC and CMP were normal.  Urinalysis with 11 WBCs per high-power field and some leukocyte esterase.  She was diagnosed with a urinary tract infection and referred for admission.  She was started on ciprofloxacin    Overview/Hospital Course:  The patient presented to the ED after calling the police. She was noted to have confusion. Police  department called EMS. Workup consistent with UTI. Patient placed on empiric antibiotics. Psychiatry consulted as patient does not appear to have capacity. Urine culture with multiple organisms. Repeat UA. Participated with therapy. 24 hour supervision recommended. Patient does not have 24 hour supervision. She has friends who check on her but are unable to provide 24 hour supervision. Skilled placement being pursued.     Interval History: f/u memory loss  no acute issues overnight. Changed antibiotics as UA still with signs of infection    Review of Systems  Objective:     Vital Signs (Most Recent):  Temp: 97.9 °F (36.6 °C) (12/28/24 0746)  Pulse: 76 (12/28/24 0746)  Resp: 18 (12/28/24 0746)  BP: 137/75 (12/28/24 0746)  SpO2: 99 % (12/28/24 0746) Vital Signs (24h Range):  Temp:  [97.6 °F (36.4 °C)-98.2 °F (36.8 °C)] 97.9 °F (36.6 °C)  Pulse:  [57-86] 76  Resp:  [16-20] 18  SpO2:  [98 %-100 %] 99 %  BP: (103-141)/(53-83) 137/75     Weight: 49.9 kg (110 lb 0.2 oz)  Body mass index is 18.88 kg/m².    Intake/Output Summary (Last 24 hours) at 12/28/2024 1043  Last data filed at 12/28/2024 0914  Gross per 24 hour   Intake 960 ml   Output --   Net 960 ml         Physical Exam  HENT:      Head: Normocephalic and atraumatic.   Cardiovascular:      Rate and Rhythm: Normal rate and regular rhythm.      Heart sounds: No murmur heard.  Pulmonary:      Effort: Pulmonary effort is normal. No respiratory distress.      Breath sounds: Normal breath sounds. No wheezing.   Abdominal:      General: Bowel sounds are normal. There is no distension.      Palpations: Abdomen is soft.      Tenderness: There is no abdominal tenderness.   Musculoskeletal:         General: No swelling.   Skin:     General: Skin is warm and dry.   Neurological:      Mental Status: She is alert. Mental status is at baseline.             Significant Labs: All pertinent labs within the past 24 hours have been reviewed.  Recent Lab Results         12/27/24  7496         Appearance, UA Hazy       Bacteria, UA Occasional       Bilirubin (UA) Negative       Color, UA Colorless       Glucose, UA Negative       Ketones, UA Negative       Leukocyte Esterase, UA 3+       Microscopic Comment SEE COMMENT  Comment: Other formed elements not mentioned in the report are not   present in the microscopic examination.          NITRITE UA Negative       Blood, UA 1+       pH, UA 5.0       Protein, UA Negative  Comment: Recommend a 24 hour urine protein or a urine   protein/creatinine ratio if globulin induced proteinuria is  clinically suspected.         RBC, UA 10       Spec Grav UA 1.005       Specimen UA Urine, Clean Catch       Squam Epithel, UA 8       UROBILINOGEN UA Negative       WBC, UA 62               Significant Imaging: I have reviewed all pertinent imaging results/findings within the past 24 hours.    Assessment and Plan     * UTI (urinary tract infection)  Change cipro to bactrim as repeat UA with signs of infection      Moderate malnutrition  Nutrition consulted. Most recent weight and BMI monitored-     Measurements:  Wt Readings from Last 1 Encounters:   12/26/24 49.9 kg (110 lb 0.2 oz)   Body mass index is 18.88 kg/m².    Patient has been screened and assessed by RD.    Malnutrition Type:  Context: chronic illness  Level: moderate    Malnutrition Characteristic Summary:  Weight Loss (Malnutrition): 20% in 1 year  Energy Intake (Malnutrition): less than or equal to 75% for greater than or equal to 1 month    Interventions/Recommendations (treatment strategy):  1. General Healthful Diet  2. Commercial beverage medical food supplement therapy      Dementia  -likely  -confusional state noted in last few medical encounters  -psych evaluated, lacks capacity  -will need safe plan for discharge. Discussed with friends. Consulted case management for skilled placement    Hypertension  Patient's blood pressure range in the last 24 hours was: BP  Min: 103/53  Max: 141/69.The patient's  inpatient anti-hypertensive regimen is listed below:  Current Antihypertensives  bisoprolol-hydrochlorothiazide 2.5-6.25 mg per tablet 1 tablet, Daily, Oral    Not previously taking medication as no recent PCP follow-up.       VTE Risk Mitigation (From admission, onward)           Ordered     enoxaparin injection 40 mg  Daily         12/24/24 1720     IP VTE HIGH RISK PATIENT  Once         12/24/24 1720     Place sequential compression device  Until discontinued         12/24/24 1720                    Discharge Planning   MEJIA:      Code Status: Full Code   Medical Readiness for Discharge Date:   Discharge Plan A: Home Health                Please place Justification for DME        Veena Gibbosn MD  Department of Hospital Medicine   [facility] - Telemetry ([facility])

## 2024-12-28 NOTE — SUBJECTIVE & OBJECTIVE
Interval History: f/u memory loss  no acute issues overnight. Changed antibiotics as UA still with signs of infection    Review of Systems  Objective:     Vital Signs (Most Recent):  Temp: 97.9 °F (36.6 °C) (12/28/24 0746)  Pulse: 76 (12/28/24 0746)  Resp: 18 (12/28/24 0746)  BP: 137/75 (12/28/24 0746)  SpO2: 99 % (12/28/24 0746) Vital Signs (24h Range):  Temp:  [97.6 °F (36.4 °C)-98.2 °F (36.8 °C)] 97.9 °F (36.6 °C)  Pulse:  [57-86] 76  Resp:  [16-20] 18  SpO2:  [98 %-100 %] 99 %  BP: (103-141)/(53-83) 137/75     Weight: 49.9 kg (110 lb 0.2 oz)  Body mass index is 18.88 kg/m².    Intake/Output Summary (Last 24 hours) at 12/28/2024 1043  Last data filed at 12/28/2024 0914  Gross per 24 hour   Intake 960 ml   Output --   Net 960 ml         Physical Exam  HENT:      Head: Normocephalic and atraumatic.   Cardiovascular:      Rate and Rhythm: Normal rate and regular rhythm.      Heart sounds: No murmur heard.  Pulmonary:      Effort: Pulmonary effort is normal. No respiratory distress.      Breath sounds: Normal breath sounds. No wheezing.   Abdominal:      General: Bowel sounds are normal. There is no distension.      Palpations: Abdomen is soft.      Tenderness: There is no abdominal tenderness.   Musculoskeletal:         General: No swelling.   Skin:     General: Skin is warm and dry.   Neurological:      Mental Status: She is alert. Mental status is at baseline.             Significant Labs: All pertinent labs within the past 24 hours have been reviewed.  Recent Lab Results         12/27/24  2314        Appearance, UA Hazy       Bacteria, UA Occasional       Bilirubin (UA) Negative       Color, UA Colorless       Glucose, UA Negative       Ketones, UA Negative       Leukocyte Esterase, UA 3+       Microscopic Comment SEE COMMENT  Comment: Other formed elements not mentioned in the report are not   present in the microscopic examination.          NITRITE UA Negative       Blood, UA 1+       pH, UA 5.0       Protein,  UA Negative  Comment: Recommend a 24 hour urine protein or a urine   protein/creatinine ratio if globulin induced proteinuria is  clinically suspected.         RBC, UA 10       Spec Grav UA 1.005       Specimen UA Urine, Clean Catch       Squam Epithel, UA 8       UROBILINOGEN UA Negative       WBC, UA 62               Significant Imaging: I have reviewed all pertinent imaging results/findings within the past 24 hours.

## 2024-12-28 NOTE — PLAN OF CARE
Pt oriented x3. Vital signs stable  Pt remained afebrile throughout this shift.   All meds administered per order.   Pt remained free of falls this shift.   Plan of care reviewed. Patient verbalizes understanding.   Pt moving/turning independently.   Bed in lowest position, upper side side rails up x 2, wheels locked  Call light in reach, bed alarm refused. Sitter at bedside.  Patient instructed to call staff for mobility/assistance.  Nonskid socks on when out of bed.  Patient education provided.  No further concerns voiced at this time.    Problem: Adult Inpatient Plan of Care  Goal: Plan of Care Review  Outcome: Progressing  Goal: Patient-Specific Goal (Individualized)  Outcome: Progressing  Goal: Absence of Hospital-Acquired Illness or Injury  Outcome: Progressing  Goal: Optimal Comfort and Wellbeing  Outcome: Progressing  Goal: Readiness for Transition of Care  Outcome: Progressing     Problem: Fall Injury Risk  Goal: Absence of Fall and Fall-Related Injury  Outcome: Progressing     Problem: UTI (Urinary Tract Infection)  Goal: Improved Infection Symptoms  Outcome: Progressing     Problem: Confusion Acute  Goal: Optimal Cognitive Function  Outcome: Progressing     Problem: Skin Injury Risk Increased  Goal: Skin Health and Integrity  Outcome: Progressing     Problem: Wound  Goal: Optimal Coping  Outcome: Progressing  Goal: Optimal Functional Ability  Outcome: Progressing  Goal: Absence of Infection Signs and Symptoms  Outcome: Progressing  Goal: Improved Oral Intake  Outcome: Progressing  Goal: Optimal Pain Control and Function  Outcome: Progressing  Goal: Skin Health and Integrity  Outcome: Progressing  Goal: Optimal Wound Healing  Outcome: Progressing

## 2024-12-29 PROCEDURE — 97116 GAIT TRAINING THERAPY: CPT

## 2024-12-29 PROCEDURE — 25000003 PHARM REV CODE 250: Performed by: INTERNAL MEDICINE

## 2024-12-29 PROCEDURE — 97110 THERAPEUTIC EXERCISES: CPT

## 2024-12-29 PROCEDURE — 25000003 PHARM REV CODE 250: Performed by: STUDENT IN AN ORGANIZED HEALTH CARE EDUCATION/TRAINING PROGRAM

## 2024-12-29 PROCEDURE — G0378 HOSPITAL OBSERVATION PER HR: HCPCS

## 2024-12-29 PROCEDURE — 96372 THER/PROPH/DIAG INJ SC/IM: CPT | Performed by: STUDENT IN AN ORGANIZED HEALTH CARE EDUCATION/TRAINING PROGRAM

## 2024-12-29 PROCEDURE — 63600175 PHARM REV CODE 636 W HCPCS: Performed by: STUDENT IN AN ORGANIZED HEALTH CARE EDUCATION/TRAINING PROGRAM

## 2024-12-29 RX ADMIN — ASPIRIN 81 MG: 81 TABLET, COATED ORAL at 09:12

## 2024-12-29 RX ADMIN — SULFAMETHOXAZOLE AND TRIMETHOPRIM 1 TABLET: 800; 160 TABLET ORAL at 08:12

## 2024-12-29 RX ADMIN — SULFAMETHOXAZOLE AND TRIMETHOPRIM 1 TABLET: 800; 160 TABLET ORAL at 09:12

## 2024-12-29 RX ADMIN — BISOPROLOL FUMARATE AND HYDROCHLOROTHIAZIDE 1 TABLET: 6.25; 2.5 TABLET ORAL at 09:12

## 2024-12-29 RX ADMIN — FLUTICASONE PROPIONATE 100 MCG: 50 SPRAY, METERED NASAL at 09:12

## 2024-12-29 RX ADMIN — ENOXAPARIN SODIUM 40 MG: 40 INJECTION SUBCUTANEOUS at 06:12

## 2024-12-29 NOTE — PT/OT/SLP PROGRESS
"Physical Therapy  Treatment    Lucy Staples   MRN: [id number]   Admitting Diagnosis: UTI (urinary tract infection)    PT Received On: 12/29/24  PT Start Time: 0715     PT Stop Time: 0738    PT Total Time (min): 23 min       Billable Minutes:  Gait Training 12 and Therapeutic Exercise 11    Treatment Type: Treatment  PT/PTA: PT     Number of PTA visits since last PT visit: 0       General Precautions: Standard, fall  Orthopedic Precautions: N/A  Braces: N/A  Respiratory Status: Room air    Spiritual, Cultural Beliefs, Lutheran Practices, Values that Affect Care: no    Subjective:  Communicated with LULA Hayden prior to session.  Patient stated, "yall are doing well for me."    Pain/Comfort  Pain Rating 1: 0/10    Objective:   Patient found with: Other (comments), telemetry, peripheral IV (Sitter)    Functional Mobility:  Bed Mobility:   NT as patient was seated in bedside chair on PT arrival    Transfers:  STS: CGA with no AD  Chair tx: CGA with cues to reach back for arm rest of chair with lowering body into chair    Gait:   80 feet x2, CGA with no AD. Patient ambulated with good xavier and stride length with no LOB      Balance:   Static Sit: GOOD+: Takes MAXIMAL challenges from all directions.    Dynamic Sit: GOOD: Maintains balance through MODERATE excursions of active trunk movement  Static Stand: GOOD-: Takes MODERATE challenges from all directions inconsistently  Dynamic stand: FAIR: Needs CONTACT GUARD during gait       Treatment and Education:    Patient educated on importance of out of bed activity with maintaining strength and endurance with functional mobility.    Patient completed seated therex:   Ankle pumps  LAQ  Marching  Hip abd/add  1 x 10     AM-PAC 6 CLICK MOBILITY  How much help from another person does this patient currently need?   1 = Unable, Total/Dependent Assistance  2 = A lot, Maximum/Moderate Assistance  3 = A little, Minimum/Contact Guard/Supervision  4 = None, Modified " [place]/Independent    Turning over in bed (including adjusting bedclothes, sheets and blankets)?: 1 (NT)  Sitting down on and standing up from a chair with arms (e.g., wheelchair, bedside commode, etc.): 3  Moving from lying on back to sitting on the side of the bed?: 1 (NT)  Moving to and from a bed to a chair (including a wheelchair)?: 3  Need to walk in hospital room?: 3  Climbing 3-5 steps with a railing?: 1 (NT)  Basic Mobility Total Score: 12    AM-PAC Raw Score CMS G-Code Modifier Level of Impairment Assistance   6 % Total / Unable   7 - 9 CM 80 - 100% Maximal Assist   10 - 14 CL 60 - 80% Moderate Assist   15 - 19 CK 40 - 60% Moderate Assist   20 - 22 CJ 20 - 40% Minimal Assist   23 CI 1-20% SBA / CGA   24 CH 0% Independent/ Mod I     Patient left up in chair with all lines intact, call button in reach, chair alarm on, and sitter present.    Assessment:  Lucy Staples is a 79 y.o. female with a medical diagnosis of UTI (urinary tract infection).    Rehab identified problem list/impairments: weakness, impaired balance, decreased safety awareness, impaired endurance, impaired self care skills, decreased coordination, decreased ROM, impaired functional mobility, decreased upper extremity function, gait instability, decreased lower extremity function    Rehab potential is good.    Activity tolerance: Good    Discharge recommendations: Low Intensity Therapy      Barriers to discharge:  NA    Equipment recommendations: none     GOALS:   Multidisciplinary Problems       Physical Therapy Goals          Problem: Physical Therapy    Goal Priority Disciplines Outcome Interventions   Physical Therapy Goal     PT, PT/OT Progressing    Description: Goals to be met by 1/9/25.  1. Pt will complete bed mobility MOD I.  2. Pt will complete sit to stand MOD I.  3. Pt will ambulate 200ft MOD I.  4. Pt will increase AMPAC score by 2 points to progress functional mobility.                       PLAN:     Patient to be seen 3 x/week to address the above listed problems via therapeutic exercises, therapeutic activities, gait training  Plan of Care expires: 01/09/25  Plan of Care reviewed with: patient         12/29/2024

## 2024-12-29 NOTE — PLAN OF CARE
Patient continues to make progress with therapy. Patient ambulated 80 feet x2, CGA with no AD. Continue per POC.

## 2024-12-29 NOTE — PROGRESS NOTES
[facility] Medicine  Progress Note    Patient Name: Lucy Staples  MRN: [id number]  Patient Class: OP- Observation   Admission Date: 2024  Length of Stay: 0 days  Attending Physician: Veena Gibbons MD  Primary Care Provider: Lamin Webber MD        Subjective     Principal Problem:UTI (urinary tract infection)        HPI:  This is an 79-year-old woman who lives alone with hypertension, hyperlipidemia who was confused and called the police department, ultimately ended up in the hospital and was found to have evidence of a UTI     Patient is accompanied by her friend from [facility], Dominick Loco ([phone]) who explains patient has a good support network with friends and neighbors who help to take care of her at home by checking on her intermittently.  It is unclear what happened today, the patient does not remember, but per the ED notes the patient could not locate her keys and called the police department who then called for an ambulance.  The patient is asymptomatic at this time and of note denies dysuria or urinary frequency.  She states that she still drives and then explained that her license has .      It sounds like the home situation may not be tenable anymore as the patient likely has undiagnosed dementia and difficulty caring for herself.  Does have a PCP but does not see her regularly.  On the day of appointments she gets worried and then does not go.  Been and her friends have been trying to get her to see the doctor unsuccessfully.      In the ED, vitals notable for some hypertension with blood pressure is 160/70s.  CBC and CMP were normal.  Urinalysis with 11 WBCs per high-power field and some leukocyte esterase.  She was diagnosed with a urinary tract infection and referred for admission.  She was started on ciprofloxacin    Overview/Hospital Course:  The patient presented to the ED after calling the police. She was noted to have confusion. Police  department called EMS. Workup consistent with UTI. Patient placed on empiric antibiotics. Psychiatry consulted as patient does not appear to have capacity. Urine culture with multiple organisms. Repeat UA. Participated with therapy. 24 hour supervision recommended. Patient does not have 24 hour supervision. She has friends who check on her but are unable to provide 24 hour supervision. Skilled placement being pursued.     Interval History: f/u memory loss no acute issues overnight. Encourage oral intake.     Review of Systems  Objective:     Vital Signs (Most Recent):  Temp: 98.4 °F (36.9 °C) (12/29/24 0738)  Pulse: 64 (12/29/24 0738)  Resp: 19 (12/29/24 0738)  BP: 136/62 (12/29/24 0738)  SpO2: 99 % (12/29/24 0738) Vital Signs (24h Range):  Temp:  [97.9 °F (36.6 °C)-98.5 °F (36.9 °C)] 98.4 °F (36.9 °C)  Pulse:  [58-75] 64  Resp:  [17-19] 19  SpO2:  [98 %-99 %] 99 %  BP: (124-138)/(52-69) 136/62     Weight: 44.1 kg (97 lb 3.6 oz)  Body mass index is 16.69 kg/m².    Intake/Output Summary (Last 24 hours) at 12/29/2024 0905  Last data filed at 12/28/2024 1300  Gross per 24 hour   Intake 360 ml   Output --   Net 360 ml         Physical Exam  HENT:      Head: Normocephalic and atraumatic.   Cardiovascular:      Rate and Rhythm: Normal rate and regular rhythm.      Heart sounds: No murmur heard.  Pulmonary:      Effort: Pulmonary effort is normal. No respiratory distress.      Breath sounds: Normal breath sounds. No wheezing.   Abdominal:      General: Bowel sounds are normal. There is no distension.      Palpations: Abdomen is soft.      Tenderness: There is no abdominal tenderness.   Musculoskeletal:         General: No swelling.   Skin:     General: Skin is warm and dry.   Neurological:      Mental Status: She is alert. Mental status is at baseline.             Significant Labs: All pertinent labs within the past 24 hours have been reviewed.        Significant Imaging: I have reviewed all pertinent imaging  results/findings within the past 24 hours.    Assessment and Plan     * UTI (urinary tract infection)  Continue bactrim      Moderate malnutrition  Nutrition consulted. Most recent weight and BMI monitored-     Measurements:  Wt Readings from Last 1 Encounters:   12/26/24 49.9 kg (110 lb 0.2 oz)   Body mass index is 18.88 kg/m².    Patient has been screened and assessed by RD.    Malnutrition Type:  Context: chronic illness  Level: moderate    Malnutrition Characteristic Summary:  Weight Loss (Malnutrition): 20% in 1 year  Energy Intake (Malnutrition): less than or equal to 75% for greater than or equal to 1 month    Interventions/Recommendations (treatment strategy):  1. General Healthful Diet  2. Commercial beverage medical food supplement therapy      Dementia  -likely  -confusional state noted in last few medical encounters  -psych evaluated, lacks capacity  -will need safe plan for discharge. Discussed with friends. Consulted case management for skilled placement as patient unable to have 24 hour supervision at home    Hypertension  Patient's blood pressure range in the last 24 hours was: BP  Min: 124/52  Max: 138/66.The patient's inpatient anti-hypertensive regimen is listed below:  Current Antihypertensives  bisoprolol-hydrochlorothiazide 2.5-6.25 mg per tablet 1 tablet, Daily, Oral    Not previously taking medication as no recent PCP follow-up.       VTE Risk Mitigation (From admission, onward)           Ordered     enoxaparin injection 40 mg  Daily         12/24/24 1720     IP VTE HIGH RISK PATIENT  Once         12/24/24 1720     Place sequential compression device  Until discontinued         12/24/24 1720                    Discharge Planning   MEJIA:      Code Status: Full Code   Medical Readiness for Discharge Date:   Discharge Plan A: Home Health                Please place Justification for DME        Veena Gibbons MD  Department of Hospital Medicine   [facility] - Telemetry ([facility])

## 2024-12-29 NOTE — ASSESSMENT & PLAN NOTE
Nutrition consulted. Most recent weight and BMI monitored-     Measurements:  Wt Readings from Last 1 Encounters:   12/29/24 44.1 kg (97 lb 3.6 oz)   Body mass index is 16.69 kg/m².    Patient has been screened and assessed by RD.    Malnutrition Type:  Context: chronic illness  Level: moderate    Malnutrition Characteristic Summary:  Weight Loss (Malnutrition): 20% in 1 year  Energy Intake (Malnutrition): less than or equal to 75% for greater than or equal to 1 month    Interventions/Recommendations (treatment strategy):  1. General Healthful Diet  2. Commercial beverage medical food supplement therapy

## 2024-12-29 NOTE — ASSESSMENT & PLAN NOTE
Patient's blood pressure range in the last 24 hours was: BP  Min: 124/52  Max: 138/66.The patient's inpatient anti-hypertensive regimen is listed below:  Current Antihypertensives  bisoprolol-hydrochlorothiazide 2.5-6.25 mg per tablet 1 tablet, Daily, Oral    Not previously taking medication as no recent PCP follow-up.

## 2024-12-29 NOTE — ASSESSMENT & PLAN NOTE
-likely  -confusional state noted in last few medical encounters  -psych evaluated, lacks capacity  -will need safe plan for discharge. Discussed with friends. Consulted case management for skilled placement as patient unable to have 24 hour supervision at home

## 2024-12-30 PROCEDURE — 96372 THER/PROPH/DIAG INJ SC/IM: CPT | Performed by: INTERNAL MEDICINE

## 2024-12-30 PROCEDURE — 25000003 PHARM REV CODE 250: Performed by: INTERNAL MEDICINE

## 2024-12-30 PROCEDURE — G0378 HOSPITAL OBSERVATION PER HR: HCPCS

## 2024-12-30 PROCEDURE — 97116 GAIT TRAINING THERAPY: CPT | Mod: CQ

## 2024-12-30 PROCEDURE — 97530 THERAPEUTIC ACTIVITIES: CPT | Mod: CQ

## 2024-12-30 PROCEDURE — 25000003 PHARM REV CODE 250: Performed by: HOSPITALIST

## 2024-12-30 PROCEDURE — 63600175 PHARM REV CODE 636 W HCPCS: Performed by: INTERNAL MEDICINE

## 2024-12-30 PROCEDURE — 25000003 PHARM REV CODE 250: Performed by: STUDENT IN AN ORGANIZED HEALTH CARE EDUCATION/TRAINING PROGRAM

## 2024-12-30 RX ORDER — ENOXAPARIN SODIUM 100 MG/ML
30 INJECTION SUBCUTANEOUS EVERY 24 HOURS
Status: DISCONTINUED | OUTPATIENT
Start: 2024-12-30 | End: 2025-01-10 | Stop reason: HOSPADM

## 2024-12-30 RX ADMIN — SULFAMETHOXAZOLE AND TRIMETHOPRIM 1 TABLET: 800; 160 TABLET ORAL at 08:12

## 2024-12-30 RX ADMIN — ASPIRIN 81 MG: 81 TABLET, COATED ORAL at 09:12

## 2024-12-30 RX ADMIN — SULFAMETHOXAZOLE AND TRIMETHOPRIM 1 TABLET: 800; 160 TABLET ORAL at 09:12

## 2024-12-30 RX ADMIN — MELATONIN TAB 3 MG 6 MG: 3 TAB at 08:12

## 2024-12-30 RX ADMIN — BISOPROLOL FUMARATE AND HYDROCHLOROTHIAZIDE 1 TABLET: 6.25; 2.5 TABLET ORAL at 09:12

## 2024-12-30 RX ADMIN — ENOXAPARIN SODIUM 30 MG: 30 INJECTION SUBCUTANEOUS at 05:12

## 2024-12-30 RX ADMIN — FLUTICASONE PROPIONATE 100 MCG: 50 SPRAY, METERED NASAL at 09:12

## 2024-12-30 NOTE — PLAN OF CARE
SW received email from Dominick Loco, patient's friend regarding SNF placement. Patient's friend stated he wished for [facility] or [facility] for placement.  SW stated that both facilities are full and unable to accept patient for care.   RUPAL stated that the next closest facility would be [facility] for placement and inquired to see if that would be agreeable for placement.  RUPAL pending response.    12 00 SW received response from Dominick Loco, inquiring about [facility]. RUPAL stated that [facility] was accept patient also. RUPAL stated that she would just need final decision made between [facility] vs [facility] to submit for insurance authorization.   RUPAL pending response.     RUPAL will continue to follow and assist as needed.

## 2024-12-30 NOTE — CONSULTS
"   12/30/24 1213   Post-Acute Status   Post-Acute Authorization Placement   Post-Acute Placement Status Pending payor review/awaiting authorization (if required)   Coverage [employer]   Discharge Delays None known at this time   Discharge Plan   Discharge Plan A Skilled Nursing Facility     Patient's friend, Dominick Loco, responded to patient's email, stating they wished for placement at [facility] for SNF.   SW responded and stated she would reach out to their liaison regarding submitting for insurance authorization.    RUPAL reached out to Marichuy, with [facility], to have them submit for insurance authorization.    RUPAL pending response from Marichuy.    Patient's PASRR/ 142 received.     12 13 Per Marichuy, with [facility], they are going to submit insurance authorization for SNF placement for Patient. SW verbalized understanding.    13 45 SW meet with Patient and friends (Dominick and Karely Claudy) at bedside regarding discharge plan. SW and friends reiterated to patient that she is going to go to a facility for a "transitional" period prior to going back home. RUPAL stated that hospital staff and friends care about patient's safety and will work with patient's insurance to get her to [facility]. Patient and friends verbalized understanding.    SW meet with patient's friend, Dominick Loco, at the nurses station. SW provided authorization to release financial information form to assist patient's friend in getting financial information if needed. Patient's friend stated the patient's  is aware of Patient's current situation and could help friends if needed, upon time of NH placement. RUPAL stated we can provide information needed at request to assist with patient's care. Patient's friend verbalized understanding.     SW will continue to follow and assist as needed.   "

## 2024-12-30 NOTE — PROGRESS NOTES
[facility] Medicine  Progress Note    Patient Name: Lucy Staples  MRN: [id number]  Patient Class: OP- Observation   Admission Date: 2024  Length of Stay: 0 days  Attending Physician: Veena Gibbons MD  Primary Care Provider: Lamin Webber MD        Subjective     Principal Problem:UTI (urinary tract infection)        HPI:  This is an 79-year-old woman who lives alone with hypertension, hyperlipidemia who was confused and called the police department, ultimately ended up in the hospital and was found to have evidence of a UTI     Patient is accompanied by her friend from [facility], Dominick Loco ([phone]) who explains patient has a good support network with friends and neighbors who help to take care of her at home by checking on her intermittently.  It is unclear what happened today, the patient does not remember, but per the ED notes the patient could not locate her keys and called the police department who then called for an ambulance.  The patient is asymptomatic at this time and of note denies dysuria or urinary frequency.  She states that she still drives and then explained that her license has .      It sounds like the home situation may not be tenable anymore as the patient likely has undiagnosed dementia and difficulty caring for herself.  Does have a PCP but does not see her regularly.  On the day of appointments she gets worried and then does not go.  Been and her friends have been trying to get her to see the doctor unsuccessfully.      In the ED, vitals notable for some hypertension with blood pressure is 160/70s.  CBC and CMP were normal.  Urinalysis with 11 WBCs per high-power field and some leukocyte esterase.  She was diagnosed with a urinary tract infection and referred for admission.  She was started on ciprofloxacin    Overview/Hospital Course:  The patient presented to the ED after calling the police. She was noted to have confusion. Police  department called EMS. Workup consistent with UTI. Patient placed on empiric antibiotics. Psychiatry consulted as patient does not appear to have capacity. Urine culture with multiple organisms. Repeat UA. Participated with therapy. 24 hour supervision recommended. Patient does not have 24 hour supervision. She has friends who check on her but are unable to provide 24 hour supervision. Skilled placement being pursued.     Interval History: f/u memory loss no acute issues overnight accepted to some facilities for placement    Review of Systems  Objective:     Vital Signs (Most Recent):  Temp: 98.3 °F (36.8 °C) (12/30/24 0713)  Pulse: 60 (12/30/24 0713)  Resp: 19 (12/30/24 0713)  BP: 130/63 (12/30/24 0927)  SpO2: 99 % (12/30/24 0713) Vital Signs (24h Range):  Temp:  [97.7 °F (36.5 °C)-98.5 °F (36.9 °C)] 98.3 °F (36.8 °C)  Pulse:  [50-69] 60  Resp:  [18-19] 19  SpO2:  [97 %-99 %] 99 %  BP: (126-145)/(57-80) 130/63     Weight: 40.2 kg (88 lb 8.2 oz)  Body mass index is 15.19 kg/m².    Intake/Output Summary (Last 24 hours) at 12/30/2024 0939  Last data filed at 12/30/2024 0841  Gross per 24 hour   Intake 480 ml   Output 1 ml   Net 479 ml         Physical Exam  HENT:      Head: Normocephalic and atraumatic.   Cardiovascular:      Rate and Rhythm: Normal rate and regular rhythm.      Heart sounds: No murmur heard.  Pulmonary:      Effort: Pulmonary effort is normal. No respiratory distress.      Breath sounds: Normal breath sounds. No wheezing.   Abdominal:      General: Bowel sounds are normal. There is no distension.      Palpations: Abdomen is soft.      Tenderness: There is no abdominal tenderness.   Musculoskeletal:         General: No swelling.   Skin:     General: Skin is warm and dry.   Neurological:      Mental Status: She is alert. Mental status is at baseline.           Significant Labs: All pertinent labs within the past 24 hours have been reviewed.        Significant Imaging: I have reviewed all pertinent  imaging results/findings within the past 24 hours.    Assessment and Plan     * UTI (urinary tract infection)  Continue bactrim      Moderate malnutrition  Nutrition consulted. Most recent weight and BMI monitored-     Measurements:  Wt Readings from Last 1 Encounters:   12/29/24 44.1 kg (97 lb 3.6 oz)   Body mass index is 16.69 kg/m².    Patient has been screened and assessed by RD.    Malnutrition Type:  Context: chronic illness  Level: moderate    Malnutrition Characteristic Summary:  Weight Loss (Malnutrition): 20% in 1 year  Energy Intake (Malnutrition): less than or equal to 75% for greater than or equal to 1 month    Interventions/Recommendations (treatment strategy):  1. General Healthful Diet  2. Commercial beverage medical food supplement therapy      Dementia  -likely  -confusional state noted in last few medical encounters  -psych evaluated, lacks capacity  -safe plan for discharge. Discussed with friends. Consulted case management for skilled placement as patient unable to have 24 hour supervision at home. Patient to be discharged to skilled facility     Hypertension  Patient's blood pressure range in the last 24 hours was: BP  Min: 121/81  Max: 138/80.The patient's inpatient anti-hypertensive regimen is listed below:  Current Antihypertensives  bisoprolol-hydrochlorothiazide 2.5-6.25 mg per tablet 1 tablet, Daily, Oral    Not previously taking medication as no recent PCP follow-up.       VTE Risk Mitigation (From admission, onward)           Ordered     enoxaparin injection 30 mg  Daily         12/30/24 0808     IP VTE HIGH RISK PATIENT  Once         12/24/24 1720     Place sequential compression device  Until discontinued         12/24/24 1720                    Discharge Planning   MEJIA:      Code Status: Full Code   Medical Readiness for Discharge Date:   Discharge Plan A: Skilled Nursing Facility   Discharge Delays: None known at this time            Please place Justification for SNEHA Curiel  SVITLANA Gibbons MD  Department of Hospital Medicine   '[facility] - Telemetry ([facility])

## 2024-12-30 NOTE — ASSESSMENT & PLAN NOTE
Patient's blood pressure range in the last 24 hours was: BP  Min: 121/81  Max: 138/80.The patient's inpatient anti-hypertensive regimen is listed below:  Current Antihypertensives  bisoprolol-hydrochlorothiazide 2.5-6.25 mg per tablet 1 tablet, Daily, Oral    Not previously taking medication as no recent PCP follow-up.

## 2024-12-30 NOTE — SUBJECTIVE & OBJECTIVE
Interval History: f/u memory loss no acute issues overnight accepted to some facilities for placement    Review of Systems  Objective:     Vital Signs (Most Recent):  Temp: 98.3 °F (36.8 °C) (12/30/24 0713)  Pulse: 60 (12/30/24 0713)  Resp: 19 (12/30/24 0713)  BP: 130/63 (12/30/24 0927)  SpO2: 99 % (12/30/24 0713) Vital Signs (24h Range):  Temp:  [97.7 °F (36.5 °C)-98.5 °F (36.9 °C)] 98.3 °F (36.8 °C)  Pulse:  [50-69] 60  Resp:  [18-19] 19  SpO2:  [97 %-99 %] 99 %  BP: (126-145)/(57-80) 130/63     Weight: 40.2 kg (88 lb 8.2 oz)  Body mass index is 15.19 kg/m².    Intake/Output Summary (Last 24 hours) at 12/30/2024 0939  Last data filed at 12/30/2024 0841  Gross per 24 hour   Intake 480 ml   Output 1 ml   Net 479 ml         Physical Exam  HENT:      Head: Normocephalic and atraumatic.   Cardiovascular:      Rate and Rhythm: Normal rate and regular rhythm.      Heart sounds: No murmur heard.  Pulmonary:      Effort: Pulmonary effort is normal. No respiratory distress.      Breath sounds: Normal breath sounds. No wheezing.   Abdominal:      General: Bowel sounds are normal. There is no distension.      Palpations: Abdomen is soft.      Tenderness: There is no abdominal tenderness.   Musculoskeletal:         General: No swelling.   Skin:     General: Skin is warm and dry.   Neurological:      Mental Status: She is alert. Mental status is at baseline.           Significant Labs: All pertinent labs within the past 24 hours have been reviewed.        Significant Imaging: I have reviewed all pertinent imaging results/findings within the past 24 hours.

## 2024-12-30 NOTE — PT/OT/SLP PROGRESS
Physical Therapy  Treatment    Lucy Staples   MRN: [id number]   Admitting Diagnosis: UTI (urinary tract infection)    PT Received On: 12/30/24  PT Start Time: 0730     PT Stop Time: 0755    PT Total Time (min): 25 min       Billable Minutes:  Gait Training 15 and Therapeutic Activity 10    Treatment Type: Treatment  PT/PTA: PTA     Number of PTA visits since last PT visit: 1       General Precautions: Standard, fall  Orthopedic Precautions: N/A  Braces: N/A  Respiratory Status: Room air    Spiritual, Cultural Beliefs, Lutheran Practices, Values that Affect Care: no    Subjective:  Communicated with patient's nurse, Juan, and completed Epic chart review prior to session.  Patient agreed to PT session.     Pain/Comfort  Pain Rating 1: 0/10  Pain Rating Post-Intervention 1: 0/10    Objective:   Patient found with: peripheral IV, telemetry, Other (comments) (SITTER)    Patient found standing at sink combing her hair with sitter present.     420ft No AD SBA    Stand pivot T/F to chair x2 trials No AD: SBA    STS from chair No AD: SBA    Stand at sink x5 min total focusing on increased tolerance to upright position, WB through BLE and standing balance while completing self care ADL tasks.   UE support not needed. SBA given throughout.    Reviewed AROM TE to BLE including: hip flex/ext, knee flex/ext, ankle PF/DF  To be completed a minimum of 10 reps for each LE in order to promote return of function, strength and ROM.     Educated patient on importance of increased tolerance to upright position and direct impact on CV endurance and strength. Patient encouraged to sit up in chair/ EOB, for a minimum of 2 consecutive hours, 3x per day. Encouraged patient to perform AROM TE to BLE throughout the day within all available planes of motion. Re enforced importance of utilizing call light to meet needs in room and not attempt to get up without staff assistance. Patient verbalized understanding and agreed to comply.        AM-PAC 6 CLICK MOBILITY  How much help from another person does this patient currently need?   1 = Unable, Total/Dependent Assistance  2 = A lot, Maximum/Moderate Assistance  3 = A little, Minimum/Contact Guard/Supervision  4 = None, Modified [place]/Independent    Turning over in bed (including adjusting bedclothes, sheets and blankets)?: 1 (NT)  Sitting down on and standing up from a chair with arms (e.g., wheelchair, bedside commode, etc.): 3  Moving from lying on back to sitting on the side of the bed?: 1 (NT)  Moving to and from a bed to a chair (including a wheelchair)?: 3  Need to walk in hospital room?: 3  Climbing 3-5 steps with a railing?: 1 (NT)  Basic Mobility Total Score: 12    AM-PAC Raw Score CMS G-Code Modifier Level of Impairment Assistance   6 % Total / Unable   7 - 9 CM 80 - 100% Maximal Assist   10 - 14 CL 60 - 80% Moderate Assist   15 - 19 CK 40 - 60% Moderate Assist   20 - 22 CJ 20 - 40% Minimal Assist   23 CI 1-20% SBA / CGA   24 CH 0% Independent/ Mod I     Patient left up in chair with call button in reach and sitter present.    Assessment:  Lucy Staples is a 79 y.o. female with a medical diagnosis of UTI (urinary tract infection) and presents with overall decline in functional mobility. Patient would continue to benefit from skilled PT to address functional limitations listed below in order to return to PLOF/decrease caregiver burden.     Rehab identified problem list/impairments: gait instability, impaired balance, impaired cognition, decreased safety awareness    Rehab potential is good.    Activity tolerance: Good    Discharge recommendations: Low Intensity Therapy      Barriers to discharge:      Equipment recommendations: none     GOALS:   Multidisciplinary Problems       Physical Therapy Goals          Problem: Physical Therapy    Goal Priority Disciplines Outcome Interventions   Physical Therapy Goal     PT, PT/OT Progressing    Description: Goals to  be met by 1/9/25.  1. Pt will complete bed mobility MOD I.  2. Pt will complete sit to stand MOD I.  3. Pt will ambulate 200ft MOD I.  4. Pt will increase AMPAC score by 2 points to progress functional mobility.                       PLAN:    Patient to be seen 3 x/week to address the above listed problems via gait training, therapeutic activities, therapeutic exercises  Plan of Care expires: 01/09/25  Plan of Care reviewed with: patient         12/30/2024

## 2024-12-30 NOTE — PLAN OF CARE
Problem: Adult Inpatient Plan of Care  Goal: Plan of Care Review  Outcome: Progressing  Goal: Patient-Specific Goal (Individualized)  Outcome: Progressing  Goal: Absence of Hospital-Acquired Illness or Injury  Outcome: Progressing  Goal: Optimal Comfort and Wellbeing  Outcome: Progressing  Goal: Readiness for Transition of Care  Outcome: Progressing     Problem: Fall Injury Risk  Goal: Absence of Fall and Fall-Related Injury  Outcome: Progressing     Problem: UTI (Urinary Tract Infection)  Goal: Improved Infection Symptoms  Outcome: Progressing     Problem: Confusion Acute  Goal: Optimal Cognitive Function  Outcome: Progressing     Problem: Skin Injury Risk Increased  Goal: Skin Health and Integrity  Outcome: Progressing     Problem: Wound  Goal: Optimal Coping  Outcome: Progressing  Goal: Optimal Functional Ability  Outcome: Progressing  Goal: Absence of Infection Signs and Symptoms  Outcome: Progressing  Goal: Improved Oral Intake  Outcome: Progressing  Goal: Optimal Pain Control and Function  Outcome: Progressing  Goal: Skin Health and Integrity  Outcome: Progressing  Goal: Optimal Wound Healing  Outcome: Progressing

## 2024-12-30 NOTE — CONSULTS
[facility] - Telemetry ([facility])  Wound Care    Patient Name:  Lucy Staples   MRN:  [id number]  Date: 12/30/2024  Diagnosis: UTI (urinary tract infection)    History:     Past Medical History:   Diagnosis Date    History of oral surgery     Hyperlipidemia     Hypertension     IBS (irritable bowel syndrome)     Rectocele     Recurrent UTI        Social History     Socioeconomic History    Marital status:    Tobacco Use    Smoking status: Never    Smokeless tobacco: Never   Substance and Sexual Activity    Alcohol use: No    Drug use: No    Sexual activity: Not Currently     Social Drivers of Health     Financial Resource Strain: Low Risk  (12/24/2024)    Overall Financial Resource Strain (CARDIA)     Difficulty of Paying Living Expenses: Not very hard   Food Insecurity: No Food Insecurity (12/24/2024)    Hunger Vital Sign     Worried About Running Out of Food in the Last Year: Never true     Ran Out of Food in the Last Year: Never true   Transportation Needs: No Transportation Needs (12/24/2024)    TRANSPORTATION NEEDS     Transportation : No   Stress: No Stress Concern Present (12/24/2024)    [place]is[place] Issue of Occupational Health - Occupational Stress Questionnaire     Feeling of Stress : Not at all   Housing Stability: Low Risk  (12/24/2024)    Housing Stability Vital Sign     Unable to Pay for Housing in the Last Year: No     Homeless in the Last Year: No       Precautions:     Allergies as of 12/24/2024 - Reviewed 12/24/2024   Allergen Reaction Noted    Pcn [penicillins] Hives 09/22/2016       [facility] Assessment Details/Treatment     Consulted on this 80 y/o F patient due to suspected pressure injury to coccygeal/buttock region. Patient admitted 12/24 with AMS and has PMH significant for HTN, HLP. She was noted to have UTI on admit. Per chart review, lives alone and has friends who stop by and check up on her. Pending Skilled placement due to increased care needs.  Patient currently sitting up in  chair, denies pain. Stands independently for sacral assessment. Blanchable redness is noted to coccyx/bilateral buttock region, no pressure injuries present. Painted with cavilon and sacral foam dressing applied for prevention. Discussed frequent position changes with patient while up in chair, sit on pillow, shift weight hip to hip frequently, stand hourly for pressure relief, and turn side to side when in bed.  Will sign off. Please reconsult prn    Blanchable Redness to coccyx/bilateral buttock: (NO PRESSURE INJURY)    12/30/2024

## 2024-12-30 NOTE — ASSESSMENT & PLAN NOTE
-likely  -confusional state noted in last few medical encounters  -psych evaluated, lacks capacity  -safe plan for discharge. Discussed with friends. Consulted case management for skilled placement as patient unable to have 24 hour supervision at home. Patient to be discharged to skilled facility

## 2024-12-31 PROCEDURE — 63600175 PHARM REV CODE 636 W HCPCS: Performed by: INTERNAL MEDICINE

## 2024-12-31 PROCEDURE — 25000003 PHARM REV CODE 250: Performed by: HOSPITALIST

## 2024-12-31 PROCEDURE — 25000003 PHARM REV CODE 250: Performed by: INTERNAL MEDICINE

## 2024-12-31 PROCEDURE — 96372 THER/PROPH/DIAG INJ SC/IM: CPT | Performed by: INTERNAL MEDICINE

## 2024-12-31 PROCEDURE — 25000003 PHARM REV CODE 250: Performed by: STUDENT IN AN ORGANIZED HEALTH CARE EDUCATION/TRAINING PROGRAM

## 2024-12-31 PROCEDURE — 25000242 PHARM REV CODE 250 ALT 637 W/ HCPCS: Performed by: INTERNAL MEDICINE

## 2024-12-31 PROCEDURE — G0378 HOSPITAL OBSERVATION PER HR: HCPCS

## 2024-12-31 RX ORDER — SULFAMETHOXAZOLE AND TRIMETHOPRIM 800; 160 MG/1; MG/1
1 TABLET ORAL 2 TIMES DAILY
Status: COMPLETED | OUTPATIENT
Start: 2024-12-31 | End: 2025-01-01

## 2024-12-31 RX ADMIN — FLUTICASONE PROPIONATE 100 MCG: 50 SPRAY, METERED NASAL at 08:12

## 2024-12-31 RX ADMIN — SULFAMETHOXAZOLE AND TRIMETHOPRIM 1 TABLET: 800; 160 TABLET ORAL at 08:12

## 2024-12-31 RX ADMIN — ASPIRIN 81 MG: 81 TABLET, COATED ORAL at 08:12

## 2024-12-31 RX ADMIN — BISOPROLOL FUMARATE AND HYDROCHLOROTHIAZIDE 1 TABLET: 6.25; 2.5 TABLET ORAL at 08:12

## 2024-12-31 RX ADMIN — MELATONIN TAB 3 MG 6 MG: 3 TAB at 08:12

## 2024-12-31 RX ADMIN — ENOXAPARIN SODIUM 30 MG: 30 INJECTION SUBCUTANEOUS at 06:12

## 2024-12-31 NOTE — CONSULTS
[facility] - Telemetry ([facility])  Adult Nutrition  Consult Note    SUMMARY     Recommendations    1. Continue liberalized regular diet. Encourage intake.   2. Will change supplement plan to chocolate Boost once/day per pt request.  3. Will continue to monitor % intake meals + supplements, weight.    Goals: Patient to consume >75% of EEN prior to RD follow up  Nutrition Goal Status: progressing  Communication of RD Recs: other (comment) (sticky note, poc)    Assessment and Plan    Endocrine  Moderate malnutrition  Malnutrition Type:  Context: chronic illness  Level: moderate    Related to (etiology):   Inadequate energy intake     Signs and Symptoms (as evidenced by):   Weight loss   Change in appetite     Malnutrition Characteristic Summary:  Weight Loss (Malnutrition): 20% in 1 year  Energy Intake (Malnutrition): less than or equal to 75% for greater than or equal to 1 month      Interventions(treatment strategy):  1. General Healthful Diet  2. Commercial beverage medical food supplement therapy    Nutrition Diagnosis Status:   New         Malnutrition Assessment  Malnutrition Context: chronic illness  Malnutrition Level: moderate      Micronutrient Evaluation Summary: suspected deficiency   Weight Loss (Malnutrition): 20% in 1 year  Energy Intake (Malnutrition): less than or equal to 75% for greater than or equal to 1 month                         Reason for Assessment    Reason For Assessment: consult, identified at risk by screening criteria; follow-up    Diagnosis: infection/sepsis  Patient Active Problem List   Diagnosis    Rectocele    UTI (urinary tract infection)    Hypertension    Dementia    Moderate malnutrition     Past Medical History:   Diagnosis Date    History of oral surgery     Hyperlipidemia     Hypertension     IBS (irritable bowel syndrome)     Rectocele     Recurrent UTI        General Information Comments:  12/26/2024:  Patient is on a regular oral diet with decreased appetite and noted  "decreased po intake of 25% at meal times.  Patient admitted with UTI and PMH of dementia.  Labs reviewed.  NKFA.  LBM: 2024.  Patient with significant weight loss within the past year.  Patient is positive for malnutrition.  RD to recommend commercial beverages: boost and to consider addtion of appetite stimulant.  RD to continue to monitor po intake and tolerance.    24: Dietitian working remotely. Spoke with pt over phone. Pt reports good appetite/intake. Eating % of most meals per flowsheet documentation. No c/o intolerance issues. Last documented BM 24. Current weight 22 lb below admit weight of 110 lb. Pt unable to recall her UBW/recent weight PTA (of note, pt disoriented x 3 per most recent flowsheet documentation). Has Boost Plus ordered but pt states she has not been receiving supplements with her meal trays - states she has never tried Boost before and would only like one/day for now, would like to try chocolate flavor.    Nutrition Discharge Planning: Liberalized Regular diet with Boost Plus PRN    Nutrition Risk Screen     Nutrition Related Social Determinants of Health: SDOH: Unable to assess at this time.  and None Identified      Nutrition/Diet History    Spiritual, Cultural Beliefs, Zoroastrianism Practices, Values that Affect Care: no  Food Allergies: NKFA  Factors Affecting Nutritional Intake: None identified at this time    Anthropometrics    Temp: 97.7 °F (36.5 °C)  Height Method: Stated  Height: 5' 4" (162.6 cm)  Height (inches): 64 in  Weight Method: Standard Scale (RN stated weight from standing scale)  Weight: 40.2 kg (88 lb 8.2 oz)  Weight (lb): 88.52 lb  Ideal Body Weight (IBW), Female: 120 lb  % Ideal Body Weight, Female (lb): 91.68 %  BMI (Calculated): 15.2  BMI Grade: 18.5-24.9 - normal  Weight Loss: unintentional  Usual Body Weight (UBW), k kg (within 1 year)  % Usual Body Weight: 79.37  % Weight Change From Usual Weight: -20.79 %   " "    Lab/Procedures/Meds    Pertinent Labs Reviewed: reviewed  BMP  Lab Results   Component Value Date     12/25/2024    K 3.7 12/25/2024     12/25/2024    CO2 25 12/25/2024    BUN 14 12/25/2024    CREATININE 0.8 12/25/2024    CALCIUM 9.0 12/25/2024    ANIONGAP 8 12/25/2024    EGFRNORACEVR >60 12/25/2024     No results found for: "LABA1C", "HGBA1C"  Lab Results   Component Value Date    CALCIUM 9.0 12/25/2024       Pertinent Medications Reviewed: reviewed  Scheduled Meds:   aspirin  81 mg Oral Daily    bisoprolol-hydrochlorothiazide 2.5-6.25 mg  1 tablet Oral Daily    enoxparin  30 mg Subcutaneous Daily    fluticasone propionate  2 spray Each Nostril Daily    sulfamethoxazole-trimethoprim 800-160mg  1 tablet Oral BID     Continuous Infusions:  PRN Meds:.  Current Facility-Administered Medications:     dextrose 50%, 12.5 g, Intravenous, PRN    dextrose 50%, 25 g, Intravenous, PRN    glucagon (human recombinant), 1 mg, Intramuscular, PRN    glucose, 16 g, Oral, PRN    glucose, 24 g, Oral, PRN    melatonin, 6 mg, Oral, Nightly PRN    naloxone, 0.02 mg, Intravenous, PRN    sodium chloride 0.9%, 10 mL, Intravenous, Q12H PRN    Physical Findings/Assessment         Estimated/Assessed Needs    Weight Used For Calorie Calculations: 40.2 kg (88 lb 10 oz) (CBW)  Energy Calorie Requirements (kcal): 6664-4332 kcal (30-35 kcal/kg)  Energy Need Method: Kcal/kg  Protein Requirements: 48-60 g (1.2-1.5 g/kg)  Weight Used For Protein Calculations: 40.2 kg (88 lb 10 oz) (CBW)  Fluid Requirements (mL): 0696-4282 mL (1 mL/kcal) or per MD  Estimated Fluid Requirement Method: RDA Method  RDA Method (mL): 1206  CHO Requirement: 150-175 g (50% of estimated needs)  Adjusted 12/31/24 d/t new weight recording    Nutrition Prescription Ordered    Current Diet Order: Regular  Oral Nutrition Supplement: Boost Plus TID    Evaluation of Received Nutrient/Fluid Intake    % Kcal Needs: %  % Protein Needs: %  I/O: 12/31/24: " +4480.7 mL  Energy Calories Required: meeting needs  Protein Required: meeting needs  Fluid Required: not meeting needs  Comments: LBM 12/29/24  Tolerance: tolerating  % Intake of Estimated Energy Needs: 25 - 50 %  % Meal Intake: 25 - 50 %    Nutrition Risk    Level of Risk/Frequency of Follow-up: moderate       Monitor and Evaluation    Food and Nutrient Intake: energy intake, food and beverage intake  Food and Nutrient Adminstration: diet order  Knowledge/Beliefs/Attitudes: food and nutrition knowledge/skill, beliefs and attitudes  Anthropometric Measurements: height/length, weight, weight change, body mass index  Biochemical Data, Medical Tests and Procedures: gastrointestinal profile  Nutrition-Focused Physical Findings: overall appearance       Nutrition Follow-Up    RD Follow-up?: Yes  Tasha Alcaraz, RD, LDN, CNSC

## 2024-12-31 NOTE — PLAN OF CARE
12/31/24 1043   Post-Acute Status   Post-Acute Authorization Placement  (SNF)   Post-Acute Placement Status Pending payor review/awaiting authorization (if required)   Coverage [employer]   Discharge Delays None known at this time   Discharge Plan   Discharge Plan A Skilled Nursing Facility       RUPAL received word from [facility] that their facility does have a bed available for Patient and can submit to Patient's insurance.  RUPAL selected [facility] and requested Tracie, submit for insurance authorization.   Tracie verbalized understanding and stated they can submit for authorization today.     RUPAL updated friend, Dominick Loco, over the phone regarding [facility] placement.    RUPAL will continue to follow and assist as needed.

## 2024-12-31 NOTE — PLAN OF CARE
Nutrition Recommendations/Interventions on 12/31/24:   1. Continue liberalized regular diet. Encourage intake.   2. Will change supplement plan to chocolate Boost once/day per pt request.  3. Will continue to monitor % intake meals + supplements, weight.    Goals:   Goals: Patient to consume >75% of EEN prior to RD follow up  Nutrition Goal Status: progressing  Communication of RD Recs: other (comment) (sticky note, poc)      Tasha Alcaraz, RD, LDN, CNSC

## 2025-01-01 LAB
ANION GAP SERPL CALC-SCNC: 11 MMOL/L (ref 8–16)
BASOPHILS # BLD AUTO: 0.07 K/UL (ref 0–0.2)
BASOPHILS NFR BLD: 1.4 % (ref 0–1.9)
BUN SERPL-MCNC: 15 MG/DL (ref 8–23)
CALCIUM SERPL-MCNC: 9.5 MG/DL (ref 8.7–10.5)
CHLORIDE SERPL-SCNC: 103 MMOL/L (ref 95–110)
CO2 SERPL-SCNC: 23 MMOL/L (ref 23–29)
CREAT SERPL-MCNC: 1.1 MG/DL (ref 0.5–1.4)
DIFFERENTIAL METHOD BLD: ABNORMAL
EOSINOPHIL # BLD AUTO: 0.1 K/UL (ref 0–0.5)
EOSINOPHIL NFR BLD: 1 % (ref 0–8)
ERYTHROCYTE [DISTWIDTH] IN BLOOD BY AUTOMATED COUNT: 12.7 % (ref 11.5–14.5)
EST. GFR  (NO RACE VARIABLE): 50 ML/MIN/1.73 M^2
GLUCOSE SERPL-MCNC: 83 MG/DL (ref 70–110)
HCT VFR BLD AUTO: 39.2 % (ref 37–48.5)
HGB BLD-MCNC: 12.8 G/DL (ref 12–16)
IMM GRANULOCYTES # BLD AUTO: 0.02 K/UL (ref 0–0.04)
IMM GRANULOCYTES NFR BLD AUTO: 0.4 % (ref 0–0.5)
LYMPHOCYTES # BLD AUTO: 1.3 K/UL (ref 1–4.8)
LYMPHOCYTES NFR BLD: 26.8 % (ref 18–48)
MCH RBC QN AUTO: 31.1 PG (ref 27–31)
MCHC RBC AUTO-ENTMCNC: 32.7 G/DL (ref 32–36)
MCV RBC AUTO: 95 FL (ref 82–98)
MONOCYTES # BLD AUTO: 0.5 K/UL (ref 0.3–1)
MONOCYTES NFR BLD: 10.5 % (ref 4–15)
NEUTROPHILS # BLD AUTO: 3 K/UL (ref 1.8–7.7)
NEUTROPHILS NFR BLD: 59.9 % (ref 38–73)
NRBC BLD-RTO: 0 /100 WBC
PLATELET # BLD AUTO: 250 K/UL (ref 150–450)
PMV BLD AUTO: 10.1 FL (ref 9.2–12.9)
POTASSIUM SERPL-SCNC: 3.9 MMOL/L (ref 3.5–5.1)
RBC # BLD AUTO: 4.11 M/UL (ref 4–5.4)
SODIUM SERPL-SCNC: 137 MMOL/L (ref 136–145)
WBC # BLD AUTO: 4.96 K/UL (ref 3.9–12.7)

## 2025-01-01 PROCEDURE — 97535 SELF CARE MNGMENT TRAINING: CPT

## 2025-01-01 PROCEDURE — 85025 COMPLETE CBC W/AUTO DIFF WBC: CPT

## 2025-01-01 PROCEDURE — 36415 COLL VENOUS BLD VENIPUNCTURE: CPT

## 2025-01-01 PROCEDURE — 63600175 PHARM REV CODE 636 W HCPCS: Performed by: INTERNAL MEDICINE

## 2025-01-01 PROCEDURE — G0378 HOSPITAL OBSERVATION PER HR: HCPCS

## 2025-01-01 PROCEDURE — 25000003 PHARM REV CODE 250: Performed by: STUDENT IN AN ORGANIZED HEALTH CARE EDUCATION/TRAINING PROGRAM

## 2025-01-01 PROCEDURE — 96372 THER/PROPH/DIAG INJ SC/IM: CPT | Performed by: INTERNAL MEDICINE

## 2025-01-01 PROCEDURE — 97116 GAIT TRAINING THERAPY: CPT | Mod: CQ

## 2025-01-01 PROCEDURE — 25000003 PHARM REV CODE 250: Performed by: FAMILY MEDICINE

## 2025-01-01 PROCEDURE — 25000003 PHARM REV CODE 250: Performed by: INTERNAL MEDICINE

## 2025-01-01 PROCEDURE — 25000242 PHARM REV CODE 250 ALT 637 W/ HCPCS: Performed by: INTERNAL MEDICINE

## 2025-01-01 PROCEDURE — 80048 BASIC METABOLIC PNL TOTAL CA: CPT

## 2025-01-01 PROCEDURE — 97110 THERAPEUTIC EXERCISES: CPT

## 2025-01-01 RX ORDER — POLYETHYLENE GLYCOL 3350 17 G/17G
17 POWDER, FOR SOLUTION ORAL 2 TIMES DAILY
Status: COMPLETED | OUTPATIENT
Start: 2025-01-01 | End: 2025-01-03

## 2025-01-01 RX ORDER — DOCUSATE SODIUM 100 MG/1
100 CAPSULE, LIQUID FILLED ORAL DAILY PRN
Status: DISCONTINUED | OUTPATIENT
Start: 2025-01-01 | End: 2025-01-10 | Stop reason: HOSPADM

## 2025-01-01 RX ADMIN — ENOXAPARIN SODIUM 30 MG: 30 INJECTION SUBCUTANEOUS at 04:01

## 2025-01-01 RX ADMIN — SULFAMETHOXAZOLE AND TRIMETHOPRIM 1 TABLET: 800; 160 TABLET ORAL at 09:01

## 2025-01-01 RX ADMIN — SULFAMETHOXAZOLE AND TRIMETHOPRIM 1 TABLET: 800; 160 TABLET ORAL at 08:01

## 2025-01-01 RX ADMIN — FLUTICASONE PROPIONATE 100 MCG: 50 SPRAY, METERED NASAL at 08:01

## 2025-01-01 RX ADMIN — BISOPROLOL FUMARATE AND HYDROCHLOROTHIAZIDE 1 TABLET: 6.25; 2.5 TABLET ORAL at 08:01

## 2025-01-01 RX ADMIN — DOCUSATE SODIUM 100 MG: 100 CAPSULE, LIQUID FILLED ORAL at 04:01

## 2025-01-01 RX ADMIN — POLYETHYLENE GLYCOL 3350 17 G: 17 POWDER, FOR SOLUTION ORAL at 09:01

## 2025-01-01 RX ADMIN — ASPIRIN 81 MG: 81 TABLET, COATED ORAL at 08:01

## 2025-01-01 NOTE — PLAN OF CARE
A241/A241 LUZ ELENA tSaples is a 79 y.o.female admitted on 12/24/2024 for UTI (urinary tract infection)   Code Status: Full Code MRN: [id number]   Review of patient's allergies indicates:   Allergen Reactions    Pcn [penicillins] Hives     Past Medical History:   Diagnosis Date    History of oral surgery     Hyperlipidemia     Hypertension     IBS (irritable bowel syndrome)     Rectocele     Recurrent UTI       PRN meds    dextrose 50%, 12.5 g, PRN  dextrose 50%, 25 g, PRN  glucagon (human recombinant), 1 mg, PRN  glucose, 16 g, PRN  glucose, 24 g, PRN  melatonin, 6 mg, Nightly PRN  naloxone, 0.02 mg, PRN  sodium chloride 0.9%, 10 mL, Q12H PRN         Pt intermittently oriented to self and situation.   Pt remained afebrile throughout this shift.   All meds administered per order.   Pt remained free of falls this shift.   Plan of care reviewed. Patient verbalizes understanding.   Pt moving/turning independently. Meplex added to sacrum and heels.   Bed low, side rails up x 2, wheels locked, call light in reach.   Patient instructed to call for assistance.  Patient education provided               Orientation: disoriented to, place, time, situation  Blanka Coma Scale Score: 14     Lead Monitored: Lead II Rhythm: sinus bradycardia    Cardiac/Telemetry Box Number: [id number]  VTE Core Measure: Pharmacological prophylaxis initiated/maintained Last Bowel Movement: 12/29/25  Diet Adult Regular  Voiding Characteristics: frequency  Kraig Score: 20  Fall Risk Score: 17  Accucheck []   Freq?      Lines/Drains/Airways       None

## 2025-01-01 NOTE — PROGRESS NOTES
[facility] Medicine  Progress Note    Patient Name: Lucy Staples  MRN: [id number]  Patient Class: OP- Observation   Admission Date: 2024  Length of Stay: 0 days  Attending Physician: Carmen Nguyen MD  Primary Care Provider: Lamin Webber MD        Subjective     Principal Problem:UTI (urinary tract infection)        HPI:  This is an 79-year-old woman who lives alone with hypertension, hyperlipidemia who was confused and called the police department, ultimately ended up in the hospital and was found to have evidence of a UTI     Patient is accompanied by her friend from [facility], Dominick Loco ([phone]) who explains patient has a good support network with friends and neighbors who help to take care of her at home by checking on her intermittently.  It is unclear what happened today, the patient does not remember, but per the ED notes the patient could not locate her keys and called the police department who then called for an ambulance.  The patient is asymptomatic at this time and of note denies dysuria or urinary frequency.  She states that she still drives and then explained that her license has .      It sounds like the home situation may not be tenable anymore as the patient likely has undiagnosed dementia and difficulty caring for herself.  Does have a PCP but does not see her regularly.  On the day of appointments she gets worried and then does not go.  Been and her friends have been trying to get her to see the doctor unsuccessfully.      In the ED, vitals notable for some hypertension with blood pressure is 160/70s.  CBC and CMP were normal.  Urinalysis with 11 WBCs per high-power field and some leukocyte esterase.  She was diagnosed with a urinary tract infection and referred for admission.  She was started on ciprofloxacin    Overview/Hospital Course:  The patient presented to the ED after calling the police. She was noted to have confusion. Police  department called EMS. Workup consistent with UTI. Patient placed on empiric antibiotics. Psychiatry consulted as patient does not appear to have capacity. Urine culture with multiple organisms. Repeat UA. Participated with therapy. 24 hour supervision recommended. Patient does not have 24 hour supervision. She has friends who check on her but are unable to provide 24 hour supervision. Skilled placement being pursued. Patient accepted to facility, awaiting insurance authorization.    Alert to self, in bed. Psych albertoal initially recommends discussing discharge options with patient, ideally with identified support person present. Wonder if intermediate option such as home health would be more in line with patient's expressed preferences. After more discussion with staff, patient's friends and caregiver, psych does not feel that patient currently has the capacity to make decisions regarding discharge plan due to likely major neurocognitive disorder, which is unlikely to improve with treatment of UTI : Able to state she was in hospital, but unable to say which or where. Some confusion regarding current events.CM working on insurance authorization for SNF placement.    Interval History: No acute events overnight. See Hospital Course.    Review of Systems   Constitutional:  Positive for unexpected weight change. Negative for activity change.   HENT:  Negative for congestion.    Eyes:  Negative for visual disturbance.   Respiratory:  Negative for cough, chest tightness and shortness of breath.    Cardiovascular:  Negative for chest pain, palpitations and leg swelling.   Gastrointestinal:  Negative for abdominal distention, nausea and vomiting.   Genitourinary:  Negative for difficulty urinating.   Neurological:  Positive for weakness. Negative for dizziness, light-headedness and headaches.     Objective:     Vital Signs (Most Recent):  Temp: 97.9 °F (36.6 °C) (01/01/25 1144)  Pulse: 62 (01/01/25 1144)  Resp: 18 (01/01/25  1144)  BP: (!) 105/53 (01/01/25 1144)  SpO2: 98 % (01/01/25 1144) Vital Signs (24h Range):  Temp:  [97.6 °F (36.4 °C)-99 °F (37.2 °C)] 97.9 °F (36.6 °C)  Pulse:  [55-68] 62  Resp:  [18-19] 18  SpO2:  [96 %-100 %] 98 %  BP: ()/(48-71) 105/53     Weight: 40.5 kg (89 lb 4.6 oz)  Body mass index is 15.33 kg/m².  No intake or output data in the 24 hours ending 01/01/25 1430      Physical Exam  Vitals and nursing note reviewed.   Constitutional:       General: She is not in acute distress.     Appearance: She is underweight.   HENT:      Mouth/Throat:      Mouth: Mucous membranes are moist.   Eyes:      Pupils: Pupils are equal, round, and reactive to light.   Cardiovascular:      Rate and Rhythm: Normal rate and regular rhythm.      Pulses: Normal pulses.           Radial pulses are 2+ on the right side and 2+ on the left side.        Dorsalis pedis pulses are 2+ on the right side and 2+ on the left side.      Heart sounds: Normal heart sounds, S1 normal and S2 normal. No murmur heard.  Pulmonary:      Effort: Pulmonary effort is normal. No respiratory distress.      Breath sounds: Normal breath sounds and air entry.   Abdominal:      General: Abdomen is flat.      Palpations: Abdomen is soft.   Musculoskeletal:      Right lower leg: No edema.      Left lower leg: No edema.   Skin:     General: Skin is warm and dry.      Capillary Refill: Capillary refill takes 2 to 3 seconds.   Neurological:      General: No focal deficit present.      Mental Status: She is alert. Mental status is at baseline. She is confused.   Psychiatric:         Attention and Perception: Attention normal.         Speech: Speech normal.         Behavior: Behavior normal. Behavior is cooperative.         Cognition and Memory: Cognition is impaired. She exhibits impaired recent memory.             Significant Labs: All pertinent labs within the past 24 hours have been reviewed.  BMP:   Recent Labs   Lab 01/01/25  1102   GLU 83      K 3.9       CO2 23   BUN 15   CREATININE 1.1   CALCIUM 9.5     CBC:   Recent Labs   Lab 01/01/25  1102   WBC 4.96   HGB 12.8   HCT 39.2          Significant Imaging: I have reviewed all pertinent imaging results/findings within the past 24 hours.    Assessment and Plan     * UTI (urinary tract infection)  Continue bactrim, will complete course after antibiotics tomorrow      Moderate malnutrition  Nutrition consulted. Most recent weight and BMI monitored-     Measurements:  Wt Readings from Last 1 Encounters:   12/31/24 40.5 kg (89 lb 4.6 oz)   Body mass index is 15.33 kg/m².    Patient has been screened and assessed by RD.    Malnutrition Type:  Context: chronic illness  Level: moderate    Malnutrition Characteristic Summary:  Weight Loss (Malnutrition): 20% in 1 year  Energy Intake (Malnutrition): less than or equal to 75% for greater than or equal to 1 month    Interventions/Recommendations (treatment strategy):  1. Liberalized regular diet 2. Commercial beverage      Dementia    -confusional state noted in last few medical encounters  -psych evaluated: Recommend discussing discharge options with patient, ideally with identified support person present. Wonder if intermediate option such as home health would be more in line with patient's expressed preferences. Do not feel that patient currently has the capacity to make decisions regarding discharge plan due to likely major neurocognitive disorder, which is unlikely to improve with treatment of UTI   3. Recommend outpatient follow up with neurology for dementia evaluation. , lacks capacity   working on insurance approval for SNF placement.    Hypertension  Patient's blood pressure range in the last 24 hours was: BP  Min: 93/48  Max: 144/71.The patient's inpatient anti-hypertensive regimen is listed below:  Current Antihypertensives  bisoprolol-hydrochlorothiazide 2.5-6.25 mg per tablet 1 tablet, Daily, Oral    Not previously taking home  medication as no recent PCP follow-up.       VTE Risk Mitigation (From admission, onward)           Ordered     enoxaparin injection 30 mg  Daily         12/30/24 0808     IP VTE HIGH RISK PATIENT  Once         12/24/24 1720     Place sequential compression device  Until discontinued         12/24/24 1720                    Discharge Planning   MEJIA:      Code Status: Full Code   Medical Readiness for Discharge Date:   Discharge Plan A: Skilled Nursing Facility   Discharge Delays: None known at this time            Please place Justification for DME        SONDRA DominiqueC  Department of Hospital Medicine   [facility] - Telemetry ([facility])

## 2025-01-01 NOTE — PT/OT/SLP PROGRESS
Physical Therapy Treatment    Patient Name:  Lucy Staples   MRN:  [id number]    Recommendations:     Discharge Recommendations: Low Intensity Therapy  Discharge Equipment Recommendations: none  Barriers to discharge: None    Assessment:     Lucy Staples is a 79 y.o. female admitted with a medical diagnosis of UTI (urinary tract infection).  She presents with the following impairments/functional limitations: gait instability, impaired balance, impaired cognition, decreased safety awareness, weakness.    Pt continues to show good progress towards prior level of function with improving safety awareness.    Rehab Prognosis: Good; patient would benefit from acute skilled PT services to address these deficits and reach maximum level of function.    Recent Surgery: * No surgery found *      Plan:     During this hospitalization, patient to be seen 3 x/week to address the identified rehab impairments via gait training, therapeutic activities, therapeutic exercises and progress toward the following goals:    Plan of Care Expires:  01/09/25    Subjective     Chief Complaint: none stated  Patient/Family Comments/goals: pt is motivated to participate  Pain/Comfort:  Pain Rating 1: 0/10  Pain Rating Post-Intervention 1: 0/10      Objective:     Communicated with pt's nurseKristen, prior to session.  Patient found HOB elevated with peripheral IV, bed alarm upon PT entry to room.     General Precautions: Standard, fall  Orthopedic Precautions: N/A  Braces: N/A  Respiratory Status: Room air     Functional Mobility:  Bed Mobility:     Supine <> Sit: supervision, HOB elevated  Transfers:     Sit <> Stand from EOB: supervision with no AD  Gait: Patient ambulates 420' throughout the hallway with stand by assistance of one person and no AD. Patient demonstrates good xavier with appropriate step length. Decreased heel strike and occasional narrow GIANNI observed. Otherwise, no SOB or LOB noted. Gait belt donned  throughout trial.  Balance: Good      AM-PAC 6 CLICK MOBILITY  Turning over in bed (including adjusting bedclothes, sheets and blankets)?: 4  Sitting down on and standing up from a chair with arms (e.g., wheelchair, bedside commode, etc.): 4  Moving from lying on back to sitting on the side of the bed?: 4  Moving to and from a bed to a chair (including a wheelchair)?: 4  Need to walk in hospital room?: 3  Climbing 3-5 steps with a railing?: 2 (NT)  Basic Mobility Total Score: 21       Treatment & Education:  Pt was educated on the goals of the session, safety awareness techniques including fall prevention, and PT POC. Pt with good understanding.  Instructed on the use of call button and calling nursing staff for assistance with mobility.  Pt questions and concerns addressed within PTA scope of practice.    Pt was able to independently don shoes prior to ambulation trial.   Pt requests to return to bed following gait training.    Patient left HOB elevated with all lines intact, call button in reach, bed alarm on, and nursing notified.    GOALS:   Multidisciplinary Problems       Physical Therapy Goals          Problem: Physical Therapy    Goal Priority Disciplines Outcome Interventions   Physical Therapy Goal     PT, PT/OT Progressing    Description: Goals to be met by 1/9/25.  1. Pt will complete bed mobility MOD I.  2. Pt will complete sit to stand MOD I.  3. Pt will ambulate 200ft MOD I.  4. Pt will increase AMPAC score by 2 points to progress functional mobility.                       Time Tracking:     PT Received On: 01/01/25  PT Start Time: 0808     PT Stop Time: 0820  PT Total Time (min): 12 min     Billable Minutes: Gait Training 12    Treatment Type: Treatment  PT/PTA: PTA     Number of PTA visits since last PT visit: 2     01/01/2025

## 2025-01-01 NOTE — PROGRESS NOTES
[facility] Medicine  Progress Note    Patient Name: Lucy Staples  MRN: [id number]  Patient Class: OP- Observation   Admission Date: 2024  Length of Stay: 0 days  Attending Physician: Veena Gibbons MD  Primary Care Provider: Lamin Webber MD        Subjective     Principal Problem:UTI (urinary tract infection)        HPI:  This is an 79-year-old woman who lives alone with hypertension, hyperlipidemia who was confused and called the police department, ultimately ended up in the hospital and was found to have evidence of a UTI     Patient is accompanied by her friend from [facility], Dominick Loco ([phone]) who explains patient has a good support network with friends and neighbors who help to take care of her at home by checking on her intermittently.  It is unclear what happened today, the patient does not remember, but per the ED notes the patient could not locate her keys and called the police department who then called for an ambulance.  The patient is asymptomatic at this time and of note denies dysuria or urinary frequency.  She states that she still drives and then explained that her license has .      It sounds like the home situation may not be tenable anymore as the patient likely has undiagnosed dementia and difficulty caring for herself.  Does have a PCP but does not see her regularly.  On the day of appointments she gets worried and then does not go.  Been and her friends have been trying to get her to see the doctor unsuccessfully.      In the ED, vitals notable for some hypertension with blood pressure is 160/70s.  CBC and CMP were normal.  Urinalysis with 11 WBCs per high-power field and some leukocyte esterase.  She was diagnosed with a urinary tract infection and referred for admission.  She was started on ciprofloxacin    Overview/Hospital Course:  The patient presented to the ED after calling the police. She was noted to have confusion. Police  department called EMS. Workup consistent with UTI. Patient placed on empiric antibiotics. Psychiatry consulted as patient does not appear to have capacity. Urine culture with multiple organisms. Repeat UA. Participated with therapy. 24 hour supervision recommended. Patient does not have 24 hour supervision. She has friends who check on her but are unable to provide 24 hour supervision. Skilled placement being pursued. Patient accepted to facility, awaiting insurance authorization.    Interval History: f/u memory loss, UTI patient reports it feels better down there. Patient accepted to a facility for palcement    Review of Systems  Objective:     Vital Signs (Most Recent):  Temp: 97.7 °F (36.5 °C) (12/31/24 1531)  Pulse: 66 (12/31/24 1531)  Resp: 18 (12/31/24 1531)  BP: (!) 144/71 (12/31/24 1531)  SpO2: 97 % (12/31/24 1531) Vital Signs (24h Range):  Temp:  [97.7 °F (36.5 °C)-98.1 °F (36.7 °C)] 97.7 °F (36.5 °C)  Pulse:  [57-87] 66  Resp:  [18] 18  SpO2:  [97 %-100 %] 97 %  BP: (122-144)/(60-71) 144/71     Weight: 40.5 kg (89 lb 4.6 oz)  Body mass index is 15.33 kg/m².  No intake or output data in the 24 hours ending 12/31/24 1855      Physical Exam  HENT:      Head: Normocephalic and atraumatic.   Cardiovascular:      Rate and Rhythm: Normal rate and regular rhythm.      Heart sounds: No murmur heard.  Pulmonary:      Effort: Pulmonary effort is normal. No respiratory distress.      Breath sounds: Normal breath sounds. No wheezing.   Abdominal:      General: Bowel sounds are normal. There is no distension.      Palpations: Abdomen is soft.      Tenderness: There is no abdominal tenderness.   Musculoskeletal:         General: No swelling.   Skin:     General: Skin is warm and dry.   Neurological:      Mental Status: She is alert and oriented to person, place, and time. Mental status is at baseline.             Significant Labs: All pertinent labs within the past 24 hours have been reviewed.    Significant Imaging: I  have reviewed all pertinent imaging results/findings within the past 24 hours.    Assessment and Plan     * UTI (urinary tract infection)  Continue bactrim, will complete course after antibiotics tomorrow      Moderate malnutrition  Nutrition consulted. Most recent weight and BMI monitored-     Measurements:  Wt Readings from Last 1 Encounters:   12/31/24 40.5 kg (89 lb 4.6 oz)   Body mass index is 15.33 kg/m².    Patient has been screened and assessed by RD.    Malnutrition Type:  Context: chronic illness  Level: moderate    Malnutrition Characteristic Summary:  Weight Loss (Malnutrition): 20% in 1 year  Energy Intake (Malnutrition): less than or equal to 75% for greater than or equal to 1 month    Interventions/Recommendations (treatment strategy):  1. Liberalized regular diet 2. Commercial beverage      Dementia  -likely  -confusional state noted in last few medical encounters  -psych evaluated, lacks capacity  -safe plan for discharge. Discussed with friends. Consulted case management for skilled placement as patient unable to have 24 hour supervision at home. Patient to be discharged to skilled facility     Hypertension  Patient's blood pressure range in the last 24 hours was: BP  Min: 122/60  Max: 144/71.The patient's inpatient anti-hypertensive regimen is listed below:  Current Antihypertensives  bisoprolol-hydrochlorothiazide 2.5-6.25 mg per tablet 1 tablet, Daily, Oral    Not previously taking medication as no recent PCP follow-up.       VTE Risk Mitigation (From admission, onward)           Ordered     enoxaparin injection 30 mg  Daily         12/30/24 0808     IP VTE HIGH RISK PATIENT  Once         12/24/24 1720     Place sequential compression device  Until discontinued         12/24/24 1720                    Discharge Planning   MEJIA:      Code Status: Full Code   Medical Readiness for Discharge Date:   Discharge Plan A: Skilled Nursing Facility   Discharge Delays: None known at this  time            Please place Justification for DME        Veena Gibbons MD  Department of Hospital Medicine   [facility] - Telemetry ([facility])

## 2025-01-01 NOTE — SUBJECTIVE & OBJECTIVE
Interval History: f/u memory loss, UTI patient reports it feels better down there. Patient accepted to a facility for palcement    Review of Systems  Objective:     Vital Signs (Most Recent):  Temp: 97.7 °F (36.5 °C) (12/31/24 1531)  Pulse: 66 (12/31/24 1531)  Resp: 18 (12/31/24 1531)  BP: (!) 144/71 (12/31/24 1531)  SpO2: 97 % (12/31/24 1531) Vital Signs (24h Range):  Temp:  [97.7 °F (36.5 °C)-98.1 °F (36.7 °C)] 97.7 °F (36.5 °C)  Pulse:  [57-87] 66  Resp:  [18] 18  SpO2:  [97 %-100 %] 97 %  BP: (122-144)/(60-71) 144/71     Weight: 40.5 kg (89 lb 4.6 oz)  Body mass index is 15.33 kg/m².  No intake or output data in the 24 hours ending 12/31/24 1855      Physical Exam  HENT:      Head: Normocephalic and atraumatic.   Cardiovascular:      Rate and Rhythm: Normal rate and regular rhythm.      Heart sounds: No murmur heard.  Pulmonary:      Effort: Pulmonary effort is normal. No respiratory distress.      Breath sounds: Normal breath sounds. No wheezing.   Abdominal:      General: Bowel sounds are normal. There is no distension.      Palpations: Abdomen is soft.      Tenderness: There is no abdominal tenderness.   Musculoskeletal:         General: No swelling.   Skin:     General: Skin is warm and dry.   Neurological:      Mental Status: She is alert and oriented to person, place, and time. Mental status is at baseline.             Significant Labs: All pertinent labs within the past 24 hours have been reviewed.    Significant Imaging: I have reviewed all pertinent imaging results/findings within the past 24 hours.

## 2025-01-01 NOTE — ASSESSMENT & PLAN NOTE
-confusional state noted in last few medical encounters  -psych evaluated: Recommend discussing discharge options with patient, ideally with identified support person present. Wonder if intermediate option such as home health would be more in line with patient's expressed preferences. Do not feel that patient currently has the capacity to make decisions regarding discharge plan due to likely major neurocognitive disorder, which is unlikely to improve with treatment of UTI   3. Recommend outpatient follow up with neurology for dementia evaluation. , lacks capacity   working on insurance approval for SNF placement.

## 2025-01-01 NOTE — ASSESSMENT & PLAN NOTE
Nutrition consulted. Most recent weight and BMI monitored-     Measurements:  Wt Readings from Last 1 Encounters:   12/31/24 40.5 kg (89 lb 4.6 oz)   Body mass index is 15.33 kg/m².    Patient has been screened and assessed by RD.    Malnutrition Type:  Context: chronic illness  Level: moderate    Malnutrition Characteristic Summary:  Weight Loss (Malnutrition): 20% in 1 year  Energy Intake (Malnutrition): less than or equal to 75% for greater than or equal to 1 month    Interventions/Recommendations (treatment strategy):  1. Liberalized regular diet 2. Commercial beverage

## 2025-01-01 NOTE — PT/OT/SLP PROGRESS
Occupational Therapy  Treatment    Lucy Staples   MRN: [id number]   Admitting Diagnosis: UTI (urinary tract infection)    OT Date of Treatment: 01/01/25   OT Start Time: 1415  OT Stop Time: 1440  OT Total Time (min): 25 min    Billable Minutes:  Self Care/Home Management 15 minutes  and Therapeutic Exercise 10 minutes    OT/INDIO: OT     Number of INDIO visits since last OT visit: 0    General Precautions: Standard, fall  Orthopedic Precautions: N/A  Braces: N/A  Respiratory Status: Room air         Subjective:  Communicated with nurse and epic chart review prior to session.    Pain/Comfort  Pain Rating 1: 0/10    Objective:        Functional Mobility:  Transfers:   Mod (I) with step<pivot transfer  Mod (I)  with ambulating 10 feet x 2     Functional Ambulation:     Activities of Daily Living:  Toileting  (s) to mod (I)   G/h task  Mod (I)  with hair grooming   S with hand hygiene  Balance:     Static Sit: GOOD: Takes MODERATE challenges from all directions  Dynamic Sit: GOOD: Maintains balance through MODERATE excursions of active trunk movement  Static Stand: FAIR+: Takes MINIMAL challenges from all directions  Dynamic stand: FAIR+: Needs CLOSE SUPERVISION during gait and is able to right self with minor LOB    Therapeutic Activities and Exercises:  Educated patient on importance of increased tolerance to upright position and direct impact on CV endurance and strength. Patient encouraged to sit up in chair/ EOB, for a minimum of 2 consecutive hours including for all meals.. Pt educated on HEP. Pt performed 1 set x 10 reps b ue rom exercise  with fair+ standing balance ( shoulder flex, chest press, elbow flex/ext hand/ digits flex/ext). Encouraged patient to perform AROM TE to BUE throughout the day within all available planes of motion. Re enforced importance of utilizing call light to meet needs in room and not attempt to get up without staff assistance. Patient verbalized understanding and agreed to  "comply.           AM-PAC 6 CLICK ADL   How much help from another person does this patient currently need?   1 = Unable, Total/Dependent Assistance  2 = A lot, Maximum/Moderate Assistance  3 = A little, Minimum/Contact Guard/Supervision  4 = None, Modified [place]/Independent    Putting on and taking off regular lower body clothing? : 3  Bathing (including washing, rinsing, drying)?: 3  Toileting, which includes using toilet, bedpan, or urinal? : 3  Putting on and taking off regular upper body clothing?: 3  Taking care of personal grooming such as brushing teeth?: 4  Eating meals?: 4  Daily Activity Total Score: 20     AM-PAC Raw Score CMS "G-Code Modifier Level of Impairment Assistance   6 % Total / Unable   7 - 8 CM 80 - 100% Maximal Assist   9-13 CL 60 - 80% Moderate Assist   14 - 19 CK 40 - 60% Moderate Assist   20 - 22 CJ 20 - 40% Minimal Assist   23 CI 1-20% SBA / CGA   24 CH 0% Independent/ Mod I       Patient left sitting edge of bed with all lines intact, call button in reach, nurse notified, and nurse present    ASSESSMENT:  Lucy Staples is a 79 y.o. female with a medical diagnosis of UTI (urinary tract infection) and presents with nurse  .    Rehab identified problem list/impairments:  weakness, impaired functional mobility, impaired cognition, decreased safety awareness, impaired endurance, gait instability, impaired self care skills, visual deficits, decreased lower extremity function, decreased ROM    Rehab potential is good.    Activity tolerance: Good    Discharge recommendations: Low Intensity Therapy   Barriers to discharge:      Equipment recommendations: to be determined by next level of care    GOALS:   Multidisciplinary Problems       Occupational Therapy Goals          Problem: Occupational Therapy    Goal Priority Disciplines Outcome Interventions   Occupational Therapy Goal     OT, PT/OT Progressing    Description: Goals to be met by: 1/9/25     Patient will increase " functional independence with ADLs by performing:    UE Dressing with [place].  Grooming while standing at sink with [place].  Toileting from toilet with [place] for hygiene and clothing management.   Toilet transfer to toilet with [place].  Upper extremity exercise program x15 reps per handout, with independence.                         Plan:  Patient to be seen 2 x/week to address the above listed problems via self-care/home management, therapeutic activities, therapeutic exercises  Plan of Care expires: 01/09/25  Plan of Care reviewed with: patient         01/01/2025

## 2025-01-01 NOTE — SUBJECTIVE & OBJECTIVE
Interval History: No acute events overnight. See Hospital Course.    Review of Systems   Constitutional:  Positive for unexpected weight change. Negative for activity change.   HENT:  Negative for congestion.    Eyes:  Negative for visual disturbance.   Respiratory:  Negative for cough, chest tightness and shortness of breath.    Cardiovascular:  Negative for chest pain, palpitations and leg swelling.   Gastrointestinal:  Negative for abdominal distention, nausea and vomiting.   Genitourinary:  Negative for difficulty urinating.   Neurological:  Positive for weakness. Negative for dizziness, light-headedness and headaches.     Objective:     Vital Signs (Most Recent):  Temp: 97.9 °F (36.6 °C) (01/01/25 1144)  Pulse: 62 (01/01/25 1144)  Resp: 18 (01/01/25 1144)  BP: (!) 105/53 (01/01/25 1144)  SpO2: 98 % (01/01/25 1144) Vital Signs (24h Range):  Temp:  [97.6 °F (36.4 °C)-99 °F (37.2 °C)] 97.9 °F (36.6 °C)  Pulse:  [55-68] 62  Resp:  [18-19] 18  SpO2:  [96 %-100 %] 98 %  BP: ()/(48-71) 105/53     Weight: 40.5 kg (89 lb 4.6 oz)  Body mass index is 15.33 kg/m².  No intake or output data in the 24 hours ending 01/01/25 1430      Physical Exam  Vitals and nursing note reviewed.   Constitutional:       General: She is not in acute distress.     Appearance: She is underweight.   HENT:      Mouth/Throat:      Mouth: Mucous membranes are moist.   Eyes:      Pupils: Pupils are equal, round, and reactive to light.   Cardiovascular:      Rate and Rhythm: Normal rate and regular rhythm.      Pulses: Normal pulses.           Radial pulses are 2+ on the right side and 2+ on the left side.        Dorsalis pedis pulses are 2+ on the right side and 2+ on the left side.      Heart sounds: Normal heart sounds, S1 normal and S2 normal. No murmur heard.  Pulmonary:      Effort: Pulmonary effort is normal. No respiratory distress.      Breath sounds: Normal breath sounds and air entry.   Abdominal:      General: Abdomen is flat.       Palpations: Abdomen is soft.   Musculoskeletal:      Right lower leg: No edema.      Left lower leg: No edema.   Skin:     General: Skin is warm and dry.      Capillary Refill: Capillary refill takes 2 to 3 seconds.   Neurological:      General: No focal deficit present.      Mental Status: She is alert. Mental status is at baseline. She is confused.   Psychiatric:         Attention and Perception: Attention normal.         Speech: Speech normal.         Behavior: Behavior normal. Behavior is cooperative.         Cognition and Memory: Cognition is impaired. She exhibits impaired recent memory.             Significant Labs: All pertinent labs within the past 24 hours have been reviewed.  BMP:   Recent Labs   Lab 01/01/25  1102   GLU 83      K 3.9      CO2 23   BUN 15   CREATININE 1.1   CALCIUM 9.5     CBC:   Recent Labs   Lab 01/01/25  1102   WBC 4.96   HGB 12.8   HCT 39.2          Significant Imaging: I have reviewed all pertinent imaging results/findings within the past 24 hours.

## 2025-01-01 NOTE — ASSESSMENT & PLAN NOTE
Patient's blood pressure range in the last 24 hours was: BP  Min: 93/48  Max: 144/71.The patient's inpatient anti-hypertensive regimen is listed below:  Current Antihypertensives  bisoprolol-hydrochlorothiazide 2.5-6.25 mg per tablet 1 tablet, Daily, Oral    Not previously taking home medication as no recent PCP follow-up.

## 2025-01-01 NOTE — ASSESSMENT & PLAN NOTE
Patient's blood pressure range in the last 24 hours was: BP  Min: 122/60  Max: 144/71.The patient's inpatient anti-hypertensive regimen is listed below:  Current Antihypertensives  bisoprolol-hydrochlorothiazide 2.5-6.25 mg per tablet 1 tablet, Daily, Oral    Not previously taking medication as no recent PCP follow-up.

## 2025-01-02 PROCEDURE — G0378 HOSPITAL OBSERVATION PER HR: HCPCS

## 2025-01-02 PROCEDURE — 25000003 PHARM REV CODE 250: Performed by: HOSPITALIST

## 2025-01-02 PROCEDURE — 63600175 PHARM REV CODE 636 W HCPCS: Performed by: INTERNAL MEDICINE

## 2025-01-02 PROCEDURE — 96372 THER/PROPH/DIAG INJ SC/IM: CPT | Performed by: INTERNAL MEDICINE

## 2025-01-02 PROCEDURE — 25000003 PHARM REV CODE 250: Performed by: FAMILY MEDICINE

## 2025-01-02 PROCEDURE — 25000003 PHARM REV CODE 250: Performed by: STUDENT IN AN ORGANIZED HEALTH CARE EDUCATION/TRAINING PROGRAM

## 2025-01-02 RX ADMIN — BISOPROLOL FUMARATE AND HYDROCHLOROTHIAZIDE 1 TABLET: 6.25; 2.5 TABLET ORAL at 09:01

## 2025-01-02 RX ADMIN — ASPIRIN 81 MG: 81 TABLET, COATED ORAL at 09:01

## 2025-01-02 RX ADMIN — MELATONIN TAB 3 MG 6 MG: 3 TAB at 09:01

## 2025-01-02 RX ADMIN — FLUTICASONE PROPIONATE 100 MCG: 50 SPRAY, METERED NASAL at 09:01

## 2025-01-02 RX ADMIN — ENOXAPARIN SODIUM 30 MG: 30 INJECTION SUBCUTANEOUS at 05:01

## 2025-01-02 RX ADMIN — POLYETHYLENE GLYCOL 3350 17 G: 17 POWDER, FOR SOLUTION ORAL at 09:01

## 2025-01-02 NOTE — SUBJECTIVE & OBJECTIVE
Interval History: No acute events overnight, awake, lying in bed. Confused but communicative with staff. Denies discomfort or distress.    Review of Systems   Constitutional:  Positive for unexpected weight change. Negative for activity change.   HENT:  Negative for congestion.    Eyes:  Negative for visual disturbance.   Respiratory:  Negative for cough, chest tightness and shortness of breath.    Cardiovascular:  Negative for chest pain, palpitations and leg swelling.   Gastrointestinal:  Negative for abdominal distention, nausea and vomiting.   Genitourinary:  Negative for difficulty urinating.   Neurological:  Positive for weakness. Negative for dizziness, light-headedness and headaches.     Objective:     Vital Signs (Most Recent):  Temp: 97.7 °F (36.5 °C) (01/02/25 0837)  Pulse: 62 (01/02/25 0837)  Resp: 16 (01/02/25 0837)  BP: (!) 126/58 (01/02/25 0837)  SpO2: 99 % (01/02/25 0837) Vital Signs (24h Range):  Temp:  [97.5 °F (36.4 °C)-98.5 °F (36.9 °C)] 97.7 °F (36.5 °C)  Pulse:  [57-74] 62  Resp:  [16-18] 16  SpO2:  [96 %-100 %] 99 %  BP: (100-126)/(44-59) 126/58     Weight: 43.7 kg (96 lb 5.5 oz)  Body mass index is 16.54 kg/m².  No intake or output data in the 24 hours ending 01/02/25 0959      Physical Exam  Vitals and nursing note reviewed.   Constitutional:       General: She is not in acute distress.     Appearance: She is underweight.   HENT:      Mouth/Throat:      Mouth: Mucous membranes are moist.   Eyes:      Pupils: Pupils are equal, round, and reactive to light.   Cardiovascular:      Rate and Rhythm: Normal rate and regular rhythm.      Pulses: Normal pulses.           Radial pulses are 2+ on the right side and 2+ on the left side.        Dorsalis pedis pulses are 2+ on the right side and 2+ on the left side.      Heart sounds: Normal heart sounds, S1 normal and S2 normal. No murmur heard.  Pulmonary:      Effort: Pulmonary effort is normal. No respiratory distress.      Breath sounds: Normal  breath sounds and air entry.   Abdominal:      General: Abdomen is flat.      Palpations: Abdomen is soft.   Musculoskeletal:      Right lower leg: No edema.      Left lower leg: No edema.   Skin:     General: Skin is warm and dry.      Capillary Refill: Capillary refill takes 2 to 3 seconds.   Neurological:      General: No focal deficit present.      Mental Status: She is alert. Mental status is at baseline. She is confused.   Psychiatric:         Attention and Perception: Attention normal.         Speech: Speech normal.         Behavior: Behavior normal. Behavior is cooperative.         Cognition and Memory: Cognition is impaired. She exhibits impaired recent memory.             Significant Labs: All pertinent labs within the past 24 hours have been reviewed.  BMP:   Recent Labs   Lab 01/01/25  1102   GLU 83      K 3.9      CO2 23   BUN 15   CREATININE 1.1   CALCIUM 9.5     CBC:   Recent Labs   Lab 01/01/25  1102   WBC 4.96   HGB 12.8   HCT 39.2          Significant Imaging: I have reviewed all pertinent imaging results/findings within the past 24 hours.

## 2025-01-02 NOTE — PROGRESS NOTES
[facility] Medicine  Progress Note    Patient Name: Lucy Staples  MRN: [id number]  Patient Class: OP- Observation   Admission Date: 2024  Length of Stay: 0 days  Attending Physician: Carmen Nguyen MD  Primary Care Provider: Lamin Webber MD        Subjective     Principal Problem:UTI (urinary tract infection)        HPI:  This is an 79-year-old woman who lives alone with hypertension, hyperlipidemia who was confused and called the police department, ultimately ended up in the hospital and was found to have evidence of a UTI     Patient is accompanied by her friend from [facility], Dominick Loco ([phone]) who explains patient has a good support network with friends and neighbors who help to take care of her at home by checking on her intermittently.  It is unclear what happened today, the patient does not remember, but per the ED notes the patient could not locate her keys and called the police department who then called for an ambulance.  The patient is asymptomatic at this time and of note denies dysuria or urinary frequency.  She states that she still drives and then explained that her license has .      It sounds like the home situation may not be tenable anymore as the patient likely has undiagnosed dementia and difficulty caring for herself.  Does have a PCP but does not see her regularly.  On the day of appointments she gets worried and then does not go.  Been and her friends have been trying to get her to see the doctor unsuccessfully.      In the ED, vitals notable for some hypertension with blood pressure is 160/70s.  CBC and CMP were normal.  Urinalysis with 11 WBCs per high-power field and some leukocyte esterase.  She was diagnosed with a urinary tract infection and referred for admission.  She was started on ciprofloxacin    Overview/Hospital Course:  The patient presented to the ED after calling the police. She was noted to have confusion. Police  department called EMS. Workup consistent with UTI. Patient placed on empiric antibiotics. Psychiatry consulted as patient does not appear to have capacity. Urine culture with multiple organisms. Repeat UA. Participated with therapy. 24 hour supervision recommended. Patient does not have 24 hour supervision. She has friends who check on her but are unable to provide 24 hour supervision. Skilled placement being pursued. Patient accepted to facility, awaiting insurance authorization.    Psych albertoal initially recommends discussing discharge options with patient, ideally with identified support person present. Wonder if intermediate option such as home health would be more in line with patient's expressed preferences. After more discussion with staff, patient's friends and caregiver, psych does not feel that patient currently has the capacity to make decisions regarding discharge plan due to likely major neurocognitive disorder, which is unlikely to improve with treatment of UTI. CM working on insurance authorization for SNF placement.     continues to work on insurance verification for placement.     Interval History: No acute events overnight, awake, lying in bed. Confused but communicative with staff. Denies discomfort or distress.    Review of Systems   Constitutional:  Positive for unexpected weight change. Negative for activity change.   HENT:  Negative for congestion.    Eyes:  Negative for visual disturbance.   Respiratory:  Negative for cough, chest tightness and shortness of breath.    Cardiovascular:  Negative for chest pain, palpitations and leg swelling.   Gastrointestinal:  Negative for abdominal distention, nausea and vomiting.   Genitourinary:  Negative for difficulty urinating.   Neurological:  Positive for weakness. Negative for dizziness, light-headedness and headaches.     Objective:     Vital Signs (Most Recent):  Temp: 97.7 °F (36.5 °C) (01/02/25 0837)  Pulse: 62 (01/02/25 0837)  Resp: 16  (01/02/25 0837)  BP: (!) 126/58 (01/02/25 0837)  SpO2: 99 % (01/02/25 0837) Vital Signs (24h Range):  Temp:  [97.5 °F (36.4 °C)-98.5 °F (36.9 °C)] 97.7 °F (36.5 °C)  Pulse:  [57-74] 62  Resp:  [16-18] 16  SpO2:  [96 %-100 %] 99 %  BP: (100-126)/(44-59) 126/58     Weight: 43.7 kg (96 lb 5.5 oz)  Body mass index is 16.54 kg/m².  No intake or output data in the 24 hours ending 01/02/25 0959      Physical Exam  Vitals and nursing note reviewed.   Constitutional:       General: She is not in acute distress.     Appearance: She is underweight.   HENT:      Mouth/Throat:      Mouth: Mucous membranes are moist.   Eyes:      Pupils: Pupils are equal, round, and reactive to light.   Cardiovascular:      Rate and Rhythm: Normal rate and regular rhythm.      Pulses: Normal pulses.           Radial pulses are 2+ on the right side and 2+ on the left side.        Dorsalis pedis pulses are 2+ on the right side and 2+ on the left side.      Heart sounds: Normal heart sounds, S1 normal and S2 normal. No murmur heard.  Pulmonary:      Effort: Pulmonary effort is normal. No respiratory distress.      Breath sounds: Normal breath sounds and air entry.   Abdominal:      General: Abdomen is flat.      Palpations: Abdomen is soft.   Musculoskeletal:      Right lower leg: No edema.      Left lower leg: No edema.   Skin:     General: Skin is warm and dry.      Capillary Refill: Capillary refill takes 2 to 3 seconds.   Neurological:      General: No focal deficit present.      Mental Status: She is alert. Mental status is at baseline. She is confused.   Psychiatric:         Attention and Perception: Attention normal.         Speech: Speech normal.         Behavior: Behavior normal. Behavior is cooperative.         Cognition and Memory: Cognition is impaired. She exhibits impaired recent memory.             Significant Labs: All pertinent labs within the past 24 hours have been reviewed.  BMP:   Recent Labs   Lab 01/01/25  1102   GLU 83   NA  137   K 3.9      CO2 23   BUN 15   CREATININE 1.1   CALCIUM 9.5     CBC:   Recent Labs   Lab 01/01/25  1102   WBC 4.96   HGB 12.8   HCT 39.2          Significant Imaging: I have reviewed all pertinent imaging results/findings within the past 24 hours.    Assessment and Plan     * UTI (urinary tract infection)  Complete course of antibiotics 12/28 and .  Resolved.      Moderate malnutrition  Nutrition consulted. Most recent weight and BMI monitored-     Measurements:  Wt Readings from Last 1 Encounters:   01/02/25 43.7 kg (96 lb 5.5 oz)   Body mass index is 16.54 kg/m².    Patient has been screened and assessed by RD.    Malnutrition Type:  Context: chronic illness  Level: moderate    Malnutrition Characteristic Summary:  Weight Loss (Malnutrition): 20% in 1 year  Energy Intake (Malnutrition): less than or equal to 75% for greater than or equal to 1 month    Interventions/Recommendations (treatment strategy):  1. Liberalized regular diet 2. Commercial beverage    Being discharged to SNF facility for better [facility] care.      Dementia    -confusional state noted in last few medical encounters  -psych evaluated: Recommend discussing discharge options with patient, ideally with identified support person present. Wonder if intermediate option such as home health would be more in line with patient's expressed preferences. Do not feel that patient currently has the capacity to make decisions regarding discharge plan due to likely major neurocognitive disorder, which is unlikely to improve with treatment of UTI   3. Recommend outpatient follow up with neurology for dementia evaluation. , lacks capacity   working on insurance approval for SNF placement.    OP follow-up with neurology set up.    Hypertension  Patient's blood pressure range in the last 24 hours was: BP  Min: 100/44  Max: 126/58.The patient's inpatient anti-hypertensive regimen is listed below:  Current  Antihypertensives  bisoprolol-hydrochlorothiazide 2.5-6.25 mg per tablet 1 tablet, Daily, Oral    Will set up with PCP for medication management, being discharged to SNF facility       VTE Risk Mitigation (From admission, onward)           Ordered     enoxaparin injection 30 mg  Daily         12/30/24 0808     IP VTE HIGH RISK PATIENT  Once         12/24/24 1720     Place sequential compression device  Until discontinued         12/24/24 1720                    Discharge Planning   MEJIA:      Code Status: Full Code   Medical Readiness for Discharge Date:   Discharge Plan A: Skilled Nursing Facility   Discharge Delays: None known at this time            Please place Justification for DME        ABBI Dominique-C  Department of Hospital Medicine   [facility] - Telemetry ([facility])

## 2025-01-02 NOTE — ASSESSMENT & PLAN NOTE
Nutrition consulted. Most recent weight and BMI monitored-     Measurements:  Wt Readings from Last 1 Encounters:   01/02/25 43.7 kg (96 lb 5.5 oz)   Body mass index is 16.54 kg/m².    Patient has been screened and assessed by RD.    Malnutrition Type:  Context: chronic illness  Level: moderate    Malnutrition Characteristic Summary:  Weight Loss (Malnutrition): 20% in 1 year  Energy Intake (Malnutrition): less than or equal to 75% for greater than or equal to 1 month    Interventions/Recommendations (treatment strategy):  1. Liberalized regular diet 2. Commercial beverage    Being discharged to SNF facility for better [facility] care.

## 2025-01-02 NOTE — ASSESSMENT & PLAN NOTE
-confusional state noted in last few medical encounters  -psych evaluated: Recommend discussing discharge options with patient, ideally with identified support person present. Wonder if intermediate option such as home health would be more in line with patient's expressed preferences. Do not feel that patient currently has the capacity to make decisions regarding discharge plan due to likely major neurocognitive disorder, which is unlikely to improve with treatment of UTI   3. Recommend outpatient follow up with neurology for dementia evaluation. , lacks capacity   working on insurance approval for SNF placement.    OP follow-up with neurology set up.

## 2025-01-02 NOTE — PLAN OF CARE
"Oriented to self only. Compliant with fall risks throughout this shift, easily redirected to remain in bed & wait for staff assistance to exit bed.  VSS  NAD    Bed in low & locked position with call light in reach. Continuing to monitor. 24h cc complete.    BP (!) 108/55 (BP Location: Left arm, Patient Position: Lying)   Pulse (!) 57   Temp 97.5 °F (36.4 °C) (Oral)   Resp 18   Ht 5' 4" (1.626 m)   Wt 43.7 kg (96 lb 5.5 oz)   SpO2 100%   BMI 16.54 kg/m²     "

## 2025-01-02 NOTE — ASSESSMENT & PLAN NOTE
Patient's blood pressure range in the last 24 hours was: BP  Min: 100/44  Max: 126/58.The patient's inpatient anti-hypertensive regimen is listed below:  Current Antihypertensives  bisoprolol-hydrochlorothiazide 2.5-6.25 mg per tablet 1 tablet, Daily, Oral    Will set up with PCP for medication management, being discharged to SNF facility

## 2025-01-02 NOTE — PLAN OF CARE
01/02/25 0946   Post-Acute Status   Post-Acute Authorization Placement   Post-Acute Placement Status Pending payor review/awaiting authorization (if required)   Coverage [employer]   Discharge Delays None known at this time   Discharge Plan   Discharge Plan A Skilled Nursing Facility       Per Tracie, with [facility], Patient's insurance authorization is still pending for SNF.   RUPAL requested Tracie let SW know if she receives authorization. Tracie verbalized understanding.     10 15 Per Tracie, with [facility], she reached out to [facility] who stated they should have decision on SNF by the end of the day. RUPAL inquired if MD could place DC orders and finalize AVS for anticipated DC. RUPAL pending response from Tracie, with [facility].     13 31 RUPAL reached out to [employer], regarding Patient's SNF authorization. Per Awais, with [employer],  - Stephenie, with [employer] called Tracie, with [facility] to request P2P for SNF admission. RUPAL attempted to reach out to Tracie with [facility] regarding P2P information. Tracie did not answer, but Attending agreeable to completing P2P.  RUPAL will reach out to patient's friend, Dominick, regarding group home Nursing Home Placement.     14 02 RUPAL contacted Patient's friend, Dominick Loco, regarding discharge plan at this time. RUPAL stated that Patient's insurance was offering a P2P for Skilled Nursing placement, in order to get Patient into [facility] - quicker than just group home - Nursing Home Placement, however if Insurance did denied SNF placement, group home would be possible. RUPAL stated that if SNF placement was not an option the facility would need the Long-Term Medicaid application and 3 months of Patient's Bank Statements. RUPAL stated she would email patient's friend, the application and once it was completed, he could just email it back to RUPAL. Patient's friend verbalized understanding and stated he would work on the application and work on getting  financial information.     14 28 SW contacted [facility]'s P2P line to schedule P2P for patient's SNF placement. RUPAL spoke to [facility] and scheduled P2P with Attending and [facility] Medical Director Kristin Solano MD, at 4 pm today.   RUPAL updated Attending and Tracie with [facility] [place] [facility] of P2P being scheduled.     SW will continue to follow and assist as needed.

## 2025-01-03 PROCEDURE — 97110 THERAPEUTIC EXERCISES: CPT

## 2025-01-03 PROCEDURE — 25000003 PHARM REV CODE 250: Performed by: STUDENT IN AN ORGANIZED HEALTH CARE EDUCATION/TRAINING PROGRAM

## 2025-01-03 PROCEDURE — 97530 THERAPEUTIC ACTIVITIES: CPT

## 2025-01-03 PROCEDURE — 25000003 PHARM REV CODE 250: Performed by: NURSE PRACTITIONER

## 2025-01-03 PROCEDURE — 63600175 PHARM REV CODE 636 W HCPCS: Performed by: INTERNAL MEDICINE

## 2025-01-03 PROCEDURE — 96372 THER/PROPH/DIAG INJ SC/IM: CPT | Performed by: INTERNAL MEDICINE

## 2025-01-03 PROCEDURE — 25000003 PHARM REV CODE 250: Performed by: HOSPITALIST

## 2025-01-03 PROCEDURE — G0378 HOSPITAL OBSERVATION PER HR: HCPCS

## 2025-01-03 PROCEDURE — 25000003 PHARM REV CODE 250: Performed by: FAMILY MEDICINE

## 2025-01-03 PROCEDURE — 97116 GAIT TRAINING THERAPY: CPT

## 2025-01-03 RX ORDER — TRAZODONE HYDROCHLORIDE 50 MG/1
50 TABLET ORAL NIGHTLY PRN
Status: DISCONTINUED | OUTPATIENT
Start: 2025-01-03 | End: 2025-01-10 | Stop reason: HOSPADM

## 2025-01-03 RX ADMIN — FLUTICASONE PROPIONATE 100 MCG: 50 SPRAY, METERED NASAL at 08:01

## 2025-01-03 RX ADMIN — ENOXAPARIN SODIUM 30 MG: 30 INJECTION SUBCUTANEOUS at 05:01

## 2025-01-03 RX ADMIN — POLYETHYLENE GLYCOL 3350 17 G: 17 POWDER, FOR SOLUTION ORAL at 08:01

## 2025-01-03 RX ADMIN — MELATONIN TAB 3 MG 6 MG: 3 TAB at 09:01

## 2025-01-03 RX ADMIN — ASPIRIN 81 MG: 81 TABLET, COATED ORAL at 08:01

## 2025-01-03 RX ADMIN — TRAZODONE HYDROCHLORIDE 50 MG: 50 TABLET ORAL at 09:01

## 2025-01-03 RX ADMIN — BISOPROLOL FUMARATE AND HYDROCHLOROTHIAZIDE 1 TABLET: 6.25; 2.5 TABLET ORAL at 08:01

## 2025-01-03 NOTE — PT/OT/SLP PROGRESS
"Occupational Therapy   Treatment    Name: Lucy Staples  MRN: [id number]  Admitting Diagnosis:  UTI (urinary tract infection)       Recommendations:     Discharge Recommendations: Low Intensity Therapy  Discharge Equipment Recommendations:  to be determined by next level of care  Barriers to discharge:  None    Assessment:     Lucy Staples is a 79 y.o. female with a medical diagnosis of UTI (urinary tract infection).  She presents with the following performance deficits affecting function are weakness, impaired endurance, impaired functional mobility, gait instability, decreased safety awareness.     Rehab Prognosis:  Good; patient would benefit from acute skilled OT services to address these deficits and reach maximum level of function.       Plan:     Patient to be seen 2 x/week to address the above listed problems via self-care/home management, therapeutic activities, therapeutic exercises  Plan of Care Expires: 01/09/25  Plan of Care Reviewed with: patient    Subjective     Chief Complaint: "Ready to go home!"  Patient/Family Comments/goals: Increase independence  Pain/Comfort:  Pain Rating 1: 0/10    Objective:     Communicated with: Kathleen and DANIEL prior to session.  Patient found up in chair with   upon OT entry to room.    General Precautions: Standard, fall    Orthopedic Precautions:N/A  Braces: N/A  Respiratory Status: Room air     Occupational Performance:       Functional Mobility/Transfers:  Pt stated she had walked plenty and did not with to have to do it again when PT stopped by.    Activities of Daily Living:  Pt denied any ADLs assistance    [facility] 6 Click ADL: 20    Treatment & Education:  OT role, plan of care, progression of goals, importance of continued OOB activity, ADL/functional transfer and mobility retraining, call don't fall, safety precautions, fall prevention.  Encouraged completion of B UE AROM therex throughout the day to increase functional strength and activity " tolerance needed for ADL completion.  Pt completed the following therex with red theraband 3x10 to promote increase BM, FM, and independent transfers:   Shoulder abduction   Elbow flexion   Elbow extension   Chair presses   Shoulder flexion      Patient left up in chair with all lines intact, call button in reach, and chair alarm on    GOALS:   Multidisciplinary Problems       Occupational Therapy Goals          Problem: Occupational Therapy    Goal Priority Disciplines Outcome Interventions   Occupational Therapy Goal     OT, PT/OT Progressing    Description: Goals to be met by: 1/9/25     Patient will increase functional independence with ADLs by performing:    UE Dressing with [place].  Grooming while standing at sink with [place].  Toileting from toilet with [place] for hygiene and clothing management.   Toilet transfer to toilet with [place].  Upper extremity exercise program x15 reps per handout, with independence.                         Time Tracking:     OT Date of Treatment: 01/03/25  OT Start Time: 1015  OT Stop Time: 1040  OT Total Time (min): 25 min    Billable Minutes:Therapeutic Activity 10  Therapeutic Exercise 15    OT/INDIO: INDIO     Number of INDIO visits since last OT visit: 1  A client care conference was performed between the CARSON and MARTHA, prior to treatment by MARTHA, to discuss the patient's status, treatment plan and established goals.  MARTHA Patterson  1/3/2025

## 2025-01-03 NOTE — SUBJECTIVE & OBJECTIVE
Interval History: No acute events overnight, awake, lying in bed. Confused but communicative with staff. Denies discomfort or distress.    Review of Systems   Constitutional:  Positive for unexpected weight change. Negative for activity change.   HENT:  Negative for congestion.    Eyes:  Negative for visual disturbance.   Respiratory:  Negative for cough, chest tightness and shortness of breath.    Cardiovascular:  Negative for chest pain, palpitations and leg swelling.   Gastrointestinal:  Negative for abdominal distention, nausea and vomiting.   Genitourinary:  Negative for difficulty urinating.   Neurological:  Positive for weakness. Negative for dizziness, light-headedness and headaches.     Objective:     Vital Signs (Most Recent):  Temp: 98.5 °F (36.9 °C) (01/03/25 0720)  Pulse: 63 (01/03/25 0720)  Resp: 16 (01/03/25 0720)  BP: (!) 114/56 (01/03/25 0720)  SpO2: 96 % (01/03/25 0720) Vital Signs (24h Range):  Temp:  [97.3 °F (36.3 °C)-98.5 °F (36.9 °C)] 98.5 °F (36.9 °C)  Pulse:  [60-74] 63  Resp:  [16-20] 16  SpO2:  [96 %-99 %] 96 %  BP: ()/(54-69) 114/56     Weight: 43.7 kg (96 lb 5.5 oz)  Body mass index is 16.54 kg/m².  No intake or output data in the 24 hours ending 01/03/25 1119      Physical Exam  Vitals and nursing note reviewed.   Constitutional:       General: She is not in acute distress.     Appearance: She is underweight.   HENT:      Mouth/Throat:      Mouth: Mucous membranes are moist.   Eyes:      Pupils: Pupils are equal, round, and reactive to light.   Cardiovascular:      Rate and Rhythm: Normal rate and regular rhythm.      Pulses: Normal pulses.           Radial pulses are 2+ on the right side and 2+ on the left side.        Dorsalis pedis pulses are 2+ on the right side and 2+ on the left side.      Heart sounds: Normal heart sounds, S1 normal and S2 normal. No murmur heard.  Pulmonary:      Effort: Pulmonary effort is normal. No respiratory distress.      Breath sounds: Normal breath  "sounds and air entry.   Abdominal:      General: Abdomen is flat.      Palpations: Abdomen is soft.   Musculoskeletal:      Right lower leg: No edema.      Left lower leg: No edema.   Skin:     General: Skin is warm and dry.      Capillary Refill: Capillary refill takes 2 to 3 seconds.   Neurological:      General: No focal deficit present.      Mental Status: She is alert. Mental status is at baseline. She is confused.   Psychiatric:         Attention and Perception: Attention normal.         Speech: Speech normal.         Behavior: Behavior normal. Behavior is cooperative.         Cognition and Memory: Cognition is impaired. She exhibits impaired recent memory.             Significant Labs: All pertinent labs within the past 24 hours have been reviewed.  BMP:   No results for input(s): "GLU", "NA", "K", "CL", "CO2", "BUN", "CREATININE", "CALCIUM", "MG" in the last 48 hours.    CBC:   No results for input(s): "WBC", "HGB", "HCT", "PLT" in the last 48 hours.      Significant Imaging: I have reviewed all pertinent imaging results/findings within the past 24 hours.  "

## 2025-01-03 NOTE — PROGRESS NOTES
[facility] Medicine  Progress Note    Patient Name: Lucy Staples  MRN: [id number]  Patient Class: OP- Observation   Admission Date: 2024  Length of Stay: 0 days  Attending Physician: Carmen Nguyen MD  Primary Care Provider: Lamin Webber MD        Subjective     Principal Problem:UTI (urinary tract infection)        HPI:  This is an 79-year-old woman who lives alone with hypertension, hyperlipidemia who was confused and called the police department, ultimately ended up in the hospital and was found to have evidence of a UTI     Patient is accompanied by her friend from [facility], Dominick Loco ([phone]) who explains patient has a good support network with friends and neighbors who help to take care of her at home by checking on her intermittently.  It is unclear what happened today, the patient does not remember, but per the ED notes the patient could not locate her keys and called the police department who then called for an ambulance.  The patient is asymptomatic at this time and of note denies dysuria or urinary frequency.  She states that she still drives and then explained that her license has .      It sounds like the home situation may not be tenable anymore as the patient likely has undiagnosed dementia and difficulty caring for herself.  Does have a PCP but does not see her regularly.  On the day of appointments she gets worried and then does not go.  Been and her friends have been trying to get her to see the doctor unsuccessfully.      In the ED, vitals notable for some hypertension with blood pressure is 160/70s.  CBC and CMP were normal.  Urinalysis with 11 WBCs per high-power field and some leukocyte esterase.  She was diagnosed with a urinary tract infection and referred for admission.  She was started on ciprofloxacin    Overview/Hospital Course:  The patient presented to the ED after calling the police. She was noted to have confusion. Police  department called EMS. Workup consistent with UTI. Patient placed on empiric antibiotics. Psychiatry consulted as patient does not appear to have capacity. Urine culture with multiple organisms. Repeat UA. Participated with therapy. 24 hour supervision recommended. Patient does not have 24 hour supervision. She has friends who check on her but are unable to provide 24 hour supervision. Skilled placement being pursued. Patient accepted to facility, awaiting insurance authorization.    Alert to self, in bed. Psych albertoal initially recommends discussing discharge options with patient, ideally with identified support person present. Wonder if intermediate option such as home health would be more in line with patient's expressed preferences. After more discussion with staff, patient's friends and caregiver, psych does not feel that patient currently has the capacity to make decisions regarding discharge plan due to likely major neurocognitive disorder, which is unlikely to improve with treatment of UTI : Able to state she was in hospital, but unable to say which or where. Some confusion regarding current events.CM working on insurance authorization for SNF placement.     continues to work on insurance verification for placement. Was denied SNF placement by insurance. CM will move to potential nursing home placement.    Interval History: No acute events overnight, awake, lying in bed. Confused but communicative with staff. Denies discomfort or distress.    Review of Systems   Constitutional:  Positive for unexpected weight change. Negative for activity change.   HENT:  Negative for congestion.    Eyes:  Negative for visual disturbance.   Respiratory:  Negative for cough, chest tightness and shortness of breath.    Cardiovascular:  Negative for chest pain, palpitations and leg swelling.   Gastrointestinal:  Negative for abdominal distention, nausea and vomiting.   Genitourinary:  Negative for difficulty  urinating.   Neurological:  Positive for weakness. Negative for dizziness, light-headedness and headaches.     Objective:     Vital Signs (Most Recent):  Temp: 98.5 °F (36.9 °C) (01/03/25 0720)  Pulse: 63 (01/03/25 0720)  Resp: 16 (01/03/25 0720)  BP: (!) 114/56 (01/03/25 0720)  SpO2: 96 % (01/03/25 0720) Vital Signs (24h Range):  Temp:  [97.3 °F (36.3 °C)-98.5 °F (36.9 °C)] 98.5 °F (36.9 °C)  Pulse:  [60-74] 63  Resp:  [16-20] 16  SpO2:  [96 %-99 %] 96 %  BP: ()/(54-69) 114/56     Weight: 43.7 kg (96 lb 5.5 oz)  Body mass index is 16.54 kg/m².  No intake or output data in the 24 hours ending 01/03/25 1119      Physical Exam  Vitals and nursing note reviewed.   Constitutional:       General: She is not in acute distress.     Appearance: She is underweight.   HENT:      Mouth/Throat:      Mouth: Mucous membranes are moist.   Eyes:      Pupils: Pupils are equal, round, and reactive to light.   Cardiovascular:      Rate and Rhythm: Normal rate and regular rhythm.      Pulses: Normal pulses.           Radial pulses are 2+ on the right side and 2+ on the left side.        Dorsalis pedis pulses are 2+ on the right side and 2+ on the left side.      Heart sounds: Normal heart sounds, S1 normal and S2 normal. No murmur heard.  Pulmonary:      Effort: Pulmonary effort is normal. No respiratory distress.      Breath sounds: Normal breath sounds and air entry.   Abdominal:      General: Abdomen is flat.      Palpations: Abdomen is soft.   Musculoskeletal:      Right lower leg: No edema.      Left lower leg: No edema.   Skin:     General: Skin is warm and dry.      Capillary Refill: Capillary refill takes 2 to 3 seconds.   Neurological:      General: No focal deficit present.      Mental Status: She is alert. Mental status is at baseline. She is confused.   Psychiatric:         Attention and Perception: Attention normal.         Speech: Speech normal.         Behavior: Behavior normal. Behavior is cooperative.          "Cognition and Memory: Cognition is impaired. She exhibits impaired recent memory.             Significant Labs: All pertinent labs within the past 24 hours have been reviewed.  BMP:   No results for input(s): "GLU", "NA", "K", "CL", "CO2", "BUN", "CREATININE", "CALCIUM", "MG" in the last 48 hours.    CBC:   No results for input(s): "WBC", "HGB", "HCT", "PLT" in the last 48 hours.      Significant Imaging: I have reviewed all pertinent imaging results/findings within the past 24 hours.    Assessment and Plan     * UTI (urinary tract infection)  Complete course of antibiotics 12/28 and .  Resolved.      Moderate malnutrition  Nutrition consulted. Most recent weight and BMI monitored-     Measurements:  Wt Readings from Last 1 Encounters:   01/02/25 43.7 kg (96 lb 5.5 oz)   Body mass index is 16.54 kg/m².    Patient has been screened and assessed by RD.    Malnutrition Type:  Context: chronic illness  Level: moderate    Malnutrition Characteristic Summary:  Weight Loss (Malnutrition): 20% in 1 year  Energy Intake (Malnutrition): less than or equal to 75% for greater than or equal to 1 month    Interventions/Recommendations (treatment strategy):  1. Liberalized regular diet 2. Commercial beverage          Dementia    -confusional state noted in last few medical encounters  -psych evaluated: Recommend discussing discharge options with patient, ideally with identified support person present. Wonder if intermediate option such as home health would be more in line with patient's expressed preferences. Do not feel that patient currently has the capacity to make decisions regarding discharge plan due to likely major neurocognitive disorder, which is unlikely to improve with treatment of UTI   3. Recommend outpatient follow up with neurology for dementia evaluation. , lacks capacity   working on insurance approval for SNF placement.    OP follow-up with neurology set up for dishcarge    Hypertension  Patient's " blood pressure range in the last 24 hours was: BP  Min: 96/54  Max: 138/66.The patient's inpatient anti-hypertensive regimen is listed below:  Current Antihypertensives  bisoprolol-hydrochlorothiazide 2.5-6.25 mg per tablet 1 tablet, Daily, Oral    Will set up with PCP for medication management at discharge       VTE Risk Mitigation (From admission, onward)           Ordered     enoxaparin injection 30 mg  Daily         12/30/24 0808     IP VTE HIGH RISK PATIENT  Once         12/24/24 1720     Place sequential compression device  Until discontinued         12/24/24 1720                    Discharge Planning   MEJIA:      Code Status: Full Code   Medical Readiness for Discharge Date:   Discharge Plan A: Skilled Nursing Facility   Discharge Delays: None known at this time            Please place Justification for DME        HUMBERTO Dominique  Department of Hospital Medicine   [facility] - Telemetry ([facility])

## 2025-01-03 NOTE — ASSESSMENT & PLAN NOTE
-confusional state noted in last few medical encounters  -psych evaluated: Recommend discussing discharge options with patient, ideally with identified support person present. Wonder if intermediate option such as home health would be more in line with patient's expressed preferences. Do not feel that patient currently has the capacity to make decisions regarding discharge plan due to likely major neurocognitive disorder, which is unlikely to improve with treatment of UTI   3. Recommend outpatient follow up with neurology for dementia evaluation. , lacks capacity   working on insurance approval for SNF placement.    OP follow-up with neurology set up for [facility]

## 2025-01-03 NOTE — PROGRESS NOTES
[facility] - Telemetry ([facility])  Adult Nutrition  Progress Note    SUMMARY       Recommendations    Recommendation/Intervention:   1. Recommend pt continues on a Regular diet   2. Recommend pt continues Boost plus TID as warranted   3. Encourage PO intake and recommend feeding assistance/ tray set up   4. Weigh twice weekly     Goals:  1. Patient will tolerate and consume > 75% of EEN and EPN prior to RD follow up (Improving)  Nutrition Goal Status: progressing towards goal  Communication of RD Recs: other (comment) (POC, sticky note)    Assessment and Plan    Endocrine  Moderate malnutrition  Malnutrition Type:  Context: chronic illness  Level: severe    Related to (etiology):   Psychological causes     Signs and Symptoms (as evidenced by):   BMI < 22 (older adult)  Underweight with loss of fat and muscle  Unable or unwilling to eat sufficient energy/protein to maintain a healthy weight    Malnutrition Characteristic Summary:  Weight Loss (Malnutrition): 20% in 1 year  Energy Intake (Malnutrition): less than or equal to 75% for greater than or equal to 1 month  Subcutaneous Fat (Malnutrition): severe depletion  Muscle Mass (Malnutrition): severe depletion    Interventions(treatment strategy):  1. General healthful diet  2. Commercial beverage medical food supplement therapy  3. Feeding assistance management  4. Collaboration by nutrition professional with other providers    Nutrition Diagnosis Status:   Continues         Malnutrition Assessment (1/3/25):  Malnutrition Context: chronic illness  Malnutrition Level: severe  Skin (Micronutrient): bruised, red (Kraig score = 21 (no risk)  Teeth (Micronutrient): dentures   Micronutrient Evaluation Summary: suspected deficiency   Energy Intake (Malnutrition): less than or equal to 75% for greater than or equal to 1 month  Subcutaneous Fat (Malnutrition): severe depletion  Muscle Mass (Malnutrition): severe depletion   Orbital Region (Subcutaneous Fat Loss): severe  depletion  Upper Arm Region (Subcutaneous Fat Loss): severe depletion   [place] Region (Muscle Loss): severe depletion  Clavicle Bone Region (Muscle Loss): severe depletion  Clavicle and Acromion Bone Region (Muscle Loss): severe depletion  Dorsal Hand (Muscle Loss): severe depletion  Patellar Region (Muscle Loss): moderate depletion  Anterior Thigh Region (Muscle Loss): moderate depletion  Posterior Calf Region (Muscle Loss): moderate depletion                 Reason for Assessment    Reason For Assessment: RD follow-up  Diagnosis: other (see comments) (UTI (urinary tract infection))  General Information Comments:   12/26/24:Patient is on a regular oral diet with decreased appetite and noted decreased po intake of 25% at meal times.  Patient admitted with UTI and PMH of dementia.  Labs reviewed.  NKFA.  LBM: 12/23/2024.  Patient with significant weight loss within the past year.  Patient is positive for malnutrition.  RD to recommend commercial beverages: boost and to consider addtion of appetite stimulant.  RD to continue to monitor po intake and tolerance.    12/31/24: Dietitian working remotely. Spoke with pt over phone. Pt reports good appetite/intake. Eating % of most meals per flowsheet documentation. No c/o intolerance issues. Last documented BM 12/29/24. Current weight 22 lb below admit weight of 110 lb. Pt unable to recall her UBW/recent weight PTA (of note, pt disoriented x 3 per most recent flowsheet documentation). Has Boost Plus ordered but pt states she has not been receiving supplements with her meal trays - states she has never tried Boost before and would only like one/day for now, would like to try chocolate flavor.     Follow up:   1/3/25: RD follow up. Pt is currently on a Regular diet + Boost plus once a day. EMR noted continued confusion/AMS, UTI resolved. Visited pt at bedside, pt sitting up in chair, just finished eating lunch, visually confirmed 50% consumed, AMS noted. Pt reported  "that she had a good appetite and 2 meals/day PTA, confirmed also currently has a good appetite, stated she knows that she does not eat enough. Discussed Boost plus, pt stated that she "loves" them, requested to have a snacks, encouraged PO intake and ONS as snacks, pt expressed understanding. Provided pt with a menu to help encourage pt preferred food choices as well. Pt expressed desire to consume adequate nutrition, no food avoidance present. Pt denied N/V/D, abd pain or difficulty chewing/swallowing, stated she is unsure of her UBW. NFPE performed, moderate/severe malnutrition noted. Reviewed chart: LBM  (x 5 days no BM). Labs, meds, weights reviewed. Weight charted 24 88 lbs, 25 96 lbs (BMI 16.54, Protein-energy malnutrition grade II), +8 lb wt gain x 2 days, re weigh for accuracy warranted, note last previous weight charted from , no current previous weights charted. RD will continue to follow and monitor pt's nutritional status during admit.     Nutrition Discharge Planning: General Healthful Diet + Feeding assistance/encouragement  + Boost plus as warranted     Nutrition Risk Screen       Nutrition Related Social Determinants of Health: SDOH: Adequate food in home environment and None Identified    Nutrition/Diet History    Spiritual, Cultural Beliefs, Sabianist Practices, Values that Affect Care: no  Food Allergies: NKFA  Factors Affecting Nutritional Intake: None identified at this time    Anthropometrics    Temp: 98.6 °F (37 °C)  Height Method: Stated  Height: 5' 4" (162.6 cm)  Height (inches): 64 in  Weight Method: Bed Scale  Weight: 43.7 kg (96 lb 5.5 oz)  Weight (lb): 96.34 lb  Ideal Body Weight (IBW), Female: 120 lb  % Ideal Body Weight, Female (lb): 91.68 %  BMI (Calculated): 16.5  BMI Grade: 18.5-24.9 - normal  Weight Loss: unintentional  Usual Body Weight (UBW), k kg (within 1 year)  % Usual Body Weight: 79.37  % Weight Change From Usual Weight: -20.79 %     Wt Readings from " "Last 15 Encounters:   01/02/25 43.7 kg (96 lb 5.5 oz)   01/16/20 69.1 kg (152 lb 5.4 oz)   10/18/19 68.1 kg (150 lb 2.1 oz)   09/05/19 53.7 kg (118 lb 6.2 oz)   08/08/19 68.6 kg (151 lb 3.8 oz)   06/06/19 67.6 kg (149 lb 0.5 oz)   04/26/19 65.9 kg (145 lb 4.5 oz)   03/28/19 64.8 kg (142 lb 13.7 oz)   02/28/19 63.5 kg (139 lb 15.9 oz)   01/31/19 61.6 kg (135 lb 12.9 oz)   01/22/19 61.5 kg (135 lb 9.3 oz)   01/11/18 64.8 kg (142 lb 13.7 oz)   12/01/17 64.5 kg (142 lb 3.2 oz)   11/09/17 64.3 kg (141 lb 12.1 oz)   12/30/16 65.9 kg (145 lb 4.5 oz)     Lab/Procedures/Meds    Pertinent Labs Reviewed: reviewed  Pertinent Medications Reviewed: reviewed    BMP  Lab Results   Component Value Date     01/01/2025    K 3.9 01/01/2025     01/01/2025    CO2 23 01/01/2025    BUN 15 01/01/2025    CREATININE 1.1 01/01/2025    CALCIUM 9.5 01/01/2025    ANIONGAP 11 01/01/2025    EGFRNORACEVR 50 (A) 01/01/2025     Lab Results   Component Value Date    CALCIUM 9.5 01/01/2025     Lab Results   Component Value Date    ALBUMIN 4.4 12/24/2024     Lab Results   Component Value Date    ALT 22 12/24/2024    AST 30 12/24/2024    ALKPHOS 56 12/24/2024    BILITOT 0.8 12/24/2024     No results for input(s): "POCTGLUCOSE" in the last 24 hours.    No results found for: "LABA1C", "HGBA1C"    Lab Results   Component Value Date    WBC 4.96 01/01/2025    HGB 12.8 01/01/2025    HCT 39.2 01/01/2025    MCV 95 01/01/2025     01/01/2025       Scheduled Meds:   aspirin  81 mg Oral Daily    bisoprolol-hydrochlorothiazide 2.5-6.25 mg  1 tablet Oral Daily    enoxparin  30 mg Subcutaneous Daily    fluticasone propionate  2 spray Each Nostril Daily     Continuous Infusions:  PRN Meds:.  Current Facility-Administered Medications:     dextrose 50%, 12.5 g, Intravenous, PRN    dextrose 50%, 25 g, Intravenous, PRN    docusate sodium, 100 mg, Oral, Daily PRN    glucagon (human recombinant), 1 mg, Intramuscular, PRN    glucose, 16 g, Oral, PRN    " glucose, 24 g, Oral, PRN    melatonin, 6 mg, Oral, Nightly PRN    naloxone, 0.02 mg, Intravenous, PRN    sodium chloride 0.9%, 10 mL, Intravenous, Q12H PRN      Physical Findings/Assessment         Estimated/Assessed Needs    Weight Used For Calorie Calculations: 43.7 kg (96 lb 5.5 oz)  Energy Calorie Requirements (kcal): 9597-4042 kcals (30-35 kcals/kg ABW (Underweight)  Energy Need Method: Kcal/kg  Protein Requirements: 52-66 g (1.2-1.5 g/kg ABW (Underweight)  Weight Used For Protein Calculations: 43.7 kg (96 lb 5.5 oz)  Fluid Requirements (mL): 5963-7517 mL (1 mL/kcal)  Estimated Fluid Requirement Method: RDA Method  RDA Method (mL): 1311  CHO Requirement: 163-191 g (8308-0573 kcals/8)      Nutrition Prescription Ordered    Current Diet Order: Regular diet  Oral Nutrition Supplement: Boost plus TID    Evaluation of Received Nutrient/Fluid Intake  I/O: (Net since admit):  12/31/24: +4480.7 mL  1/3/25: +4660.7 mL    Energy Calories Required: meeting needs  Protein Required: not meeting needs  Fluid Required: not meeting needs  Total Fluid Intake (mL): 180  Tolerance: tolerating  % Intake of Estimated Energy Needs: 50 - 75 %  % Meal Intake: 50 - 75 %    Nutrition Risk    Level of Risk/Frequency of Follow-up: high (F/u x 2 weekly)     Monitor and Evaluation    Food and Nutrient Intake: energy intake, food and beverage intake  Food and Nutrient Adminstration: diet order  Knowledge/Beliefs/Attitudes: food and nutrition knowledge/skill, beliefs and attitudes  Anthropometric Measurements: height/length, weight, weight change, body mass index  Biochemical Data, Medical Tests and Procedures: gastrointestinal profile  Nutrition-Focused Physical Findings: overall appearance     Nutrition Follow-Up    RD Follow-up?: Yes  Joanna Russo, BS, RDN, LDN

## 2025-01-03 NOTE — PLAN OF CARE
Pt tolerated interventions well. Pt ambulated 400ft INDEP with no AD. At this time, patient is functioning at their prior level of function and does not require further acute PT services. Please re-consult if situation changes.

## 2025-01-03 NOTE — PLAN OF CARE
RUPAL notified that P2P was denied yesterday evening. [facility] unable to accept pt for [facility] care.   SW awaiting response from [facility] and [facility] about clinical acceptance for [facility] care. Updated clinicals sent to facility for review.  RUPAL spoke with pt's friend/decision maker, Dominick, to provide the above update. Dominick reports he is actively working on LTC medicaid application and has pt's bank statements.  RUPAL provided Dominick with CM phone number and email for updates.  SW to follow.

## 2025-01-03 NOTE — PLAN OF CARE
"More oriented this shift, DO to time & situation  NAD  VSS    More compliant about staying in bed & calling staff prior to getting OOB. Bed in low & locked position with call light in reach. Continuing to monitor. 24h cc complete.    BP (!) 96/54   Pulse 61   Temp 97.9 °F (36.6 °C) (Oral)   Resp 20   Ht 5' 4" (1.626 m)   Wt 43.7 kg (96 lb 5.5 oz)   SpO2 98%   BMI 16.54 kg/m²     "

## 2025-01-03 NOTE — PT/OT/SLP PROGRESS
Physical Therapy Treatment and Discharge    Patient Name:  Lucy Staples   MRN:  [id number]    Recommendations:     Discharge Recommendations: No Therapy Indicated  Discharge Equipment Recommendations: none  Barriers to discharge: None    Assessment:     Lucy Staples is a 79 y.o. female admitted with a medical diagnosis of UTI (urinary tract infection).  At this time, patient is functioning at their prior level of function and does not require further acute PT services.    Rehab Prognosis: Good  Recent Surgery: * No surgery found *      Plan:     During this hospitalization, patient does not require further acute PT services.  Please re-consult if situation changes.    Subjective     Chief Complaint: Pt is motivated to participate  Patient/Family Comments/goals: none stated  Pain/Comfort:  Pain Rating 1: 0/10      Objective:     Communicated with nurse and epic chart review prior to session.  Patient found up in chair with chair check upon PT entry to room.     General Precautions: Standard, fall  Orthopedic Precautions: N/A  Braces: N/A  Respiratory Status: Room air     Functional Mobility:  Gait Belt Applied - Yes   Socks/Shoes Donned - Yes  Bed Mobility  Seated in chair at start of session and returned to chair  Transfers  Sit to Stand: independence with no AD  Gait  Patient ambulated 400ft with no AD and independence. Patient demonstrates steady gait. No c/o dizziness or SOB. Pt conversational and turning head to look around the halls throughout ambulation with no LOB. All lines remained intact throughout ambulation trial.  Balance  Sitting: independence  Standing: independence    AM-PAC 6 CLICK MOBILITY  Turning over in bed (including adjusting bedclothes, sheets and blankets)?: 4  Sitting down on and standing up from a chair with arms (e.g., wheelchair, bedside commode, etc.): 4  Moving from lying on back to sitting on the side of the bed?: 4  Moving to and from a bed to a chair  "(including a wheelchair)?: 4  Need to walk in hospital room?: 4  Climbing 3-5 steps with a railing?: 1 (NT)  Basic Mobility Total Score: 21       Treatment & Education:  Reviewed role of PT in acute care. Pt tolerated interventions well. Reviewed importance of OOB activities, activity pacing, and HEP (marching/hip flex, hip abd, heel slides/LAQ, quad sets, ankle pumps) in order to maintain/regain strength. Printout of exercises given to pt. Encouraged to sit up in chair for all meals. Reviewed "call don't fall" policy and increased risk of falling due to weakness, instructed to utilize call bell for assistance with all transfers. Re-enforced education to pt due to memory deficits. Pt agreeable to all requests.    Patient left up in chair with all lines intact, call button in reach, and chair alarm on..    GOALS:   Multidisciplinary Problems       Physical Therapy Goals       Not on file              Multidisciplinary Problems (Resolved)          Problem: Physical Therapy    Goal Priority Disciplines Outcome Interventions   Physical Therapy Goal   (Resolved)     PT, PT/OT Met    Description: Goals to be met by 1/9/25.  1. Pt will complete bed mobility MOD I.  2. Pt will complete sit to stand MOD I.  3. Pt will ambulate 200ft MOD I.  4. Pt will increase AMPAC score by 2 points to progress functional mobility.                       Time Tracking:     PT Received On: 01/03/25  PT Start Time: 1045     PT Stop Time: 1110  PT Total Time (min): 25 min     Billable Minutes: Gait Training 15min and Therapeutic Activity 10min    Treatment Type: Treatment  PT/PTA: PT     Number of PTA visits since last PT visit: 0     01/03/2025  "

## 2025-01-03 NOTE — ASSESSMENT & PLAN NOTE
Nutrition consulted. Most recent weight and BMI monitored-     Measurements:  Wt Readings from Last 1 Encounters:   01/02/25 43.7 kg (96 lb 5.5 oz)   Body mass index is 16.54 kg/m².    Patient has been screened and assessed by RD.    Malnutrition Type:  Context: chronic illness  Level: moderate    Malnutrition Characteristic Summary:  Weight Loss (Malnutrition): 20% in 1 year  Energy Intake (Malnutrition): less than or equal to 75% for greater than or equal to 1 month    Interventions/Recommendations (treatment strategy):  1. Liberalized regular diet 2. Commercial beverage

## 2025-01-03 NOTE — PLAN OF CARE
A241/A241 LUZ ELENA Staples is a 79 y.o.female admitted on 12/24/2024 for UTI (urinary tract infection)   Code Status: Full Code MRN: [id number]   Review of patient's allergies indicates:   Allergen Reactions    Pcn [penicillins] Hives     Past Medical History:   Diagnosis Date    History of oral surgery     Hyperlipidemia     Hypertension     IBS (irritable bowel syndrome)     Rectocele     Recurrent UTI       PRN meds    dextrose 50%, 12.5 g, PRN  dextrose 50%, 25 g, PRN  docusate sodium, 100 mg, Daily PRN  glucagon (human recombinant), 1 mg, PRN  glucose, 16 g, PRN  glucose, 24 g, PRN  melatonin, 6 mg, Nightly PRN  naloxone, 0.02 mg, PRN  sodium chloride 0.9%, 10 mL, Q12H PRN         Pt oriented x4.  VSS.  Pt remained afebrile throughout this shift.   All meds administered per order.   Pt remained free of falls this shift.   Plan of care reviewed. Patient verbalizes understanding.   Pt moving/turning independently.   Bed low, side rails up x 2, wheels locked, call light in reach.   Patient instructed to call for assistance.  Patient education provided      Chart check completed. Will continue plan of care.      Orientation: disoriented to, situation, time  Blanka Coma Scale Score: 15     Lead Monitored: Lead II Rhythm: normal sinus rhythm    Cardiac/Telemetry Box Number: [id number]  VTE Core Measure: Pharmacological prophylaxis initiated/maintained Last Bowel Movement: 12/29/25  Diet Adult Regular  Voiding Characteristics: frequency  Kraig Score: 21  Fall Risk Score: 9  Accucheck []   Freq?      Lines/Drains/Airways       None

## 2025-01-03 NOTE — PLAN OF CARE
Nutrition Recommendations/Interventions for malnutrition 1/3/25:   1. Recommend pt continues on a Regular diet   2. Recommend pt continues Boost plus TID as warranted   3. Encourage PO intake and recommend feeding assistance/ tray set up   4. Weigh twice weekly   5. Collaboration by nutrition professional with other providers     Goals:  1. Patient will tolerate and consume > 75% of EEN and EPN prior to RD follow up (Improving)    REYNALDO Camarena, RDN, LDN

## 2025-01-03 NOTE — ASSESSMENT & PLAN NOTE
Patient's blood pressure range in the last 24 hours was: BP  Min: 96/54  Max: 138/66.The patient's inpatient anti-hypertensive regimen is listed below:  Current Antihypertensives  bisoprolol-hydrochlorothiazide 2.5-6.25 mg per tablet 1 tablet, Daily, Oral    Will set up with PCP for medication management at discharge

## 2025-01-03 NOTE — PLAN OF CARE
[facility] unable to accept patient d/t concerns of wandering behaviors and lack of dementia diagnosis necessary for placement in their memory care unit.   SW awaiting response from Amparo Hudson.  [facility] contacted remaining accepting SNFs, [facility] and [facility], to inquire about acceptance for [facility] placement.   Franko with [facility] reviewing.   SW to follow.

## 2025-01-04 LAB
ANION GAP SERPL CALC-SCNC: 10 MMOL/L (ref 8–16)
BASOPHILS # BLD AUTO: 0.07 K/UL (ref 0–0.2)
BASOPHILS NFR BLD: 1.5 % (ref 0–1.9)
BUN SERPL-MCNC: 20 MG/DL (ref 8–23)
CALCIUM SERPL-MCNC: 9.4 MG/DL (ref 8.7–10.5)
CHLORIDE SERPL-SCNC: 103 MMOL/L (ref 95–110)
CO2 SERPL-SCNC: 26 MMOL/L (ref 23–29)
CREAT SERPL-MCNC: 0.8 MG/DL (ref 0.5–1.4)
DIFFERENTIAL METHOD BLD: ABNORMAL
EOSINOPHIL # BLD AUTO: 0 K/UL (ref 0–0.5)
EOSINOPHIL NFR BLD: 0.9 % (ref 0–8)
ERYTHROCYTE [DISTWIDTH] IN BLOOD BY AUTOMATED COUNT: 12.7 % (ref 11.5–14.5)
EST. GFR  (NO RACE VARIABLE): >60 ML/MIN/1.73 M^2
GLUCOSE SERPL-MCNC: 93 MG/DL (ref 70–110)
HCT VFR BLD AUTO: 37.1 % (ref 37–48.5)
HGB BLD-MCNC: 12 G/DL (ref 12–16)
IMM GRANULOCYTES # BLD AUTO: 0.01 K/UL (ref 0–0.04)
IMM GRANULOCYTES NFR BLD AUTO: 0.2 % (ref 0–0.5)
LYMPHOCYTES # BLD AUTO: 1.5 K/UL (ref 1–4.8)
LYMPHOCYTES NFR BLD: 32.1 % (ref 18–48)
MCH RBC QN AUTO: 31.3 PG (ref 27–31)
MCHC RBC AUTO-ENTMCNC: 32.3 G/DL (ref 32–36)
MCV RBC AUTO: 97 FL (ref 82–98)
MONOCYTES # BLD AUTO: 0.5 K/UL (ref 0.3–1)
MONOCYTES NFR BLD: 10 % (ref 4–15)
NEUTROPHILS # BLD AUTO: 2.6 K/UL (ref 1.8–7.7)
NEUTROPHILS NFR BLD: 55.3 % (ref 38–73)
NRBC BLD-RTO: 0 /100 WBC
PLATELET # BLD AUTO: 256 K/UL (ref 150–450)
PMV BLD AUTO: 9.9 FL (ref 9.2–12.9)
POTASSIUM SERPL-SCNC: 4.1 MMOL/L (ref 3.5–5.1)
RBC # BLD AUTO: 3.83 M/UL (ref 4–5.4)
SODIUM SERPL-SCNC: 139 MMOL/L (ref 136–145)
WBC # BLD AUTO: 4.68 K/UL (ref 3.9–12.7)

## 2025-01-04 PROCEDURE — G0378 HOSPITAL OBSERVATION PER HR: HCPCS

## 2025-01-04 PROCEDURE — 25000003 PHARM REV CODE 250: Performed by: STUDENT IN AN ORGANIZED HEALTH CARE EDUCATION/TRAINING PROGRAM

## 2025-01-04 PROCEDURE — 85025 COMPLETE CBC W/AUTO DIFF WBC: CPT

## 2025-01-04 PROCEDURE — 80048 BASIC METABOLIC PNL TOTAL CA: CPT

## 2025-01-04 PROCEDURE — 25000003 PHARM REV CODE 250: Performed by: HOSPITALIST

## 2025-01-04 PROCEDURE — 63600175 PHARM REV CODE 636 W HCPCS: Performed by: INTERNAL MEDICINE

## 2025-01-04 PROCEDURE — 96372 THER/PROPH/DIAG INJ SC/IM: CPT | Performed by: INTERNAL MEDICINE

## 2025-01-04 PROCEDURE — 36415 COLL VENOUS BLD VENIPUNCTURE: CPT

## 2025-01-04 RX ADMIN — MELATONIN TAB 3 MG 6 MG: 3 TAB at 10:01

## 2025-01-04 RX ADMIN — BISOPROLOL FUMARATE AND HYDROCHLOROTHIAZIDE 1 TABLET: 6.25; 2.5 TABLET ORAL at 09:01

## 2025-01-04 RX ADMIN — ENOXAPARIN SODIUM 30 MG: 30 INJECTION SUBCUTANEOUS at 05:01

## 2025-01-04 RX ADMIN — FLUTICASONE PROPIONATE 100 MCG: 50 SPRAY, METERED NASAL at 09:01

## 2025-01-04 RX ADMIN — ASPIRIN 81 MG: 81 TABLET, COATED ORAL at 09:01

## 2025-01-04 NOTE — PROGRESS NOTES
[facility] Medicine  Progress Note    Patient Name: Lucy Staples  MRN: [id number]  Patient Class: OP- Observation   Admission Date: 2024  Length of Stay: 0 days  Attending Physician: Carmen Nguyen MD  Primary Care Provider: Lamin Webber MD        Subjective     Principal Problem:UTI (urinary tract infection)        HPI:  This is an 79-year-old woman who lives alone with hypertension, hyperlipidemia who was confused and called the police department, ultimately ended up in the hospital and was found to have evidence of a UTI     Patient is accompanied by her friend from [facility], Dominick Loco ([phone]) who explains patient has a good support network with friends and neighbors who help to take care of her at home by checking on her intermittently.  It is unclear what happened today, the patient does not remember, but per the ED notes the patient could not locate her keys and called the police department who then called for an ambulance.  The patient is asymptomatic at this time and of note denies dysuria or urinary frequency.  She states that she still drives and then explained that her license has .      It sounds like the home situation may not be tenable anymore as the patient likely has undiagnosed dementia and difficulty caring for herself.  Does have a PCP but does not see her regularly.  On the day of appointments she gets worried and then does not go.  Been and her friends have been trying to get her to see the doctor unsuccessfully.      In the ED, vitals notable for some hypertension with blood pressure is 160/70s.  CBC and CMP were normal.  Urinalysis with 11 WBCs per high-power field and some leukocyte esterase.  She was diagnosed with a urinary tract infection and referred for admission.  She was started on ciprofloxacin    Overview/Hospital Course:  The patient presented to the ED after calling the police. She was noted to have confusion. Police  department called EMS. Workup consistent with UTI. Patient placed on empiric antibiotics. Psychiatry consulted as patient does not appear to have capacity. Urine culture with multiple organisms. Repeat UA. Participated with therapy. 24 hour supervision recommended. Patient does not have 24 hour supervision. She has friends who check on her but are unable to provide 24 hour supervision. Skilled placement being pursued. Patient accepted to facility, awaiting insurance authorization.    Alert to self, in bed. Psych albertoal initially recommends discussing discharge options with patient, ideally with identified support person present. Wonder if intermediate option such as home health would be more in line with patient's expressed preferences. After more discussion with staff, patient's friends and caregiver, psych does not feel that patient currently has the capacity to make decisions regarding discharge plan due to likely major neurocognitive disorder, which is unlikely to improve with treatment of UTI : Able to state she was in hospital, but unable to say which or where. Some confusion regarding current events.CM working on insurance authorization for SNF placement.     continues to work on insurance verification for placement. Was denied SNF placement by insurance. CM will move to potential nursing home placement.    Interval History: Sitting in chair, no acute events overnight. Continues to be confused.    Review of Systems   Constitutional:  Negative for chills, fatigue and fever.   HENT:  Negative for congestion and rhinorrhea.    Respiratory:  Negative for cough and shortness of breath.    Cardiovascular:  Negative for chest pain and leg swelling.   Gastrointestinal:  Negative for abdominal pain, constipation, diarrhea, nausea and vomiting.   Genitourinary:  Negative for difficulty urinating and dysuria.   Musculoskeletal:  Negative for arthralgias and back pain.   Skin:  Negative for rash and wound.    Neurological:  Negative for dizziness and headaches.     Objective:     Vital Signs (Most Recent):  Temp: 98.6 °F (37 °C) (01/04/25 1156)  Pulse: 66 (01/04/25 1156)  Resp: 20 (01/04/25 1156)  BP: (!) 95/51 (01/04/25 1156)  SpO2: 98 % (01/04/25 1156) Vital Signs (24h Range):  Temp:  [97.5 °F (36.4 °C)-98.6 °F (37 °C)] 98.6 °F (37 °C)  Pulse:  [54-75] 66  Resp:  [16-20] 20  SpO2:  [97 %-100 %] 98 %  BP: ()/(49-65) 95/51     Weight: 43.3 kg (95 lb 7.4 oz)  Body mass index is 16.39 kg/m².    Intake/Output Summary (Last 24 hours) at 1/4/2025 1159  Last data filed at 1/3/2025 1842  Gross per 24 hour   Intake 120 ml   Output --   Net 120 ml         Physical Exam  Vitals and nursing note reviewed.   Constitutional:       General: She is not in acute distress.     Appearance: She is not ill-appearing, toxic-appearing or diaphoretic.   HENT:      Head: Normocephalic and atraumatic.      Mouth/Throat:      Mouth: Mucous membranes are moist.   Eyes:      General: No scleral icterus.        Right eye: No discharge.         Left eye: No discharge.   Cardiovascular:      Rate and Rhythm: Normal rate and regular rhythm.      Heart sounds: Normal heart sounds.   Pulmonary:      Effort: Pulmonary effort is normal. No respiratory distress.      Breath sounds: Normal breath sounds.   Abdominal:      General: Bowel sounds are normal.      Palpations: Abdomen is soft.      Tenderness: There is no abdominal tenderness.   Musculoskeletal:      Cervical back: No rigidity.      Right lower leg: No edema.      Left lower leg: No edema.   Skin:     General: Skin is warm and dry.      Coloration: Skin is not jaundiced.   Neurological:      Mental Status: She is alert. Mental status is at baseline. She is confused.   Psychiatric:         Mood and Affect: Mood normal.         Behavior: Behavior normal.             Significant Labs: All pertinent labs within the past 24 hours have been reviewed.  BMP:   Recent Labs   Lab 01/04/25  2195    GLU 93      K 4.1      CO2 26   BUN 20   CREATININE 0.8   CALCIUM 9.4     CBC:   Recent Labs   Lab 01/04/25  1239   WBC 4.68   HGB 12.0   HCT 37.1          Significant Imaging: I have reviewed all pertinent imaging results/findings within the past 24 hours.    Assessment and Plan     * UTI (urinary tract infection)  Complete course of antibiotics 12/28 and .  Resolved.      Moderate malnutrition  Nutrition consulted. Most recent weight and BMI monitored-     Measurements:  Wt Readings from Last 1 Encounters:   01/02/25 43.7 kg (96 lb 5.5 oz)   Body mass index is 16.54 kg/m².    Patient has been screened and assessed by RD.    Malnutrition Type:  Context: chronic illness  Level: moderate    Malnutrition Characteristic Summary:  Weight Loss (Malnutrition): 20% in 1 year  Energy Intake (Malnutrition): less than or equal to 75% for greater than or equal to 1 month    Interventions/Recommendations (treatment strategy):  1. Liberalized regular diet 2. Commercial beverage          Dementia    -confusional state noted in last few medical encounters  -psych evaluated: Recommend discussing discharge options with patient, ideally with identified support person present. Wonder if intermediate option such as home health would be more in line with patient's expressed preferences. Do not feel that patient currently has the capacity to make decisions regarding discharge plan due to likely major neurocognitive disorder, which is unlikely to improve with treatment of UTI   3. Recommend outpatient follow up with neurology for dementia evaluation. , lacks capacity   working on insurance approval for SNF placement.    OP follow-up with neurology set up for dishcarge    Hypertension  Patient's blood pressure range in the last 24 hours was: BP  Min: 96/54  Max: 138/66.The patient's inpatient anti-hypertensive regimen is listed below:  Current Antihypertensives  bisoprolol-hydrochlorothiazide 2.5-6.25 mg  per tablet 1 tablet, Daily, Oral    Will set up with PCP for medication management at discharge       VTE Risk Mitigation (From admission, onward)           Ordered     enoxaparin injection 30 mg  Daily         12/30/24 0808     IP VTE HIGH RISK PATIENT  Once         12/24/24 1720     Place sequential compression device  Until discontinued         12/24/24 1720                    Discharge Planning   MEJIA:      Code Status: Full Code   Medical Readiness for Discharge Date:   Discharge Plan A: Skilled Nursing Facility   Discharge Delays: None known at this time            Please place Justification for DME        HUMBERTO Dominique  Department of Hospital Medicine   [facility] - Telemetry ([facility])

## 2025-01-04 NOTE — SUBJECTIVE & OBJECTIVE
Interval History: Sitting in chair, no acute events overnight. Continues to be confused.    Review of Systems   Constitutional:  Negative for chills, fatigue and fever.   HENT:  Negative for congestion and rhinorrhea.    Respiratory:  Negative for cough and shortness of breath.    Cardiovascular:  Negative for chest pain and leg swelling.   Gastrointestinal:  Negative for abdominal pain, constipation, diarrhea, nausea and vomiting.   Genitourinary:  Negative for difficulty urinating and dysuria.   Musculoskeletal:  Negative for arthralgias and back pain.   Skin:  Negative for rash and wound.   Neurological:  Negative for dizziness and headaches.     Objective:     Vital Signs (Most Recent):  Temp: 98.6 °F (37 °C) (01/04/25 1156)  Pulse: 66 (01/04/25 1156)  Resp: 20 (01/04/25 1156)  BP: (!) 95/51 (01/04/25 1156)  SpO2: 98 % (01/04/25 1156) Vital Signs (24h Range):  Temp:  [97.5 °F (36.4 °C)-98.6 °F (37 °C)] 98.6 °F (37 °C)  Pulse:  [54-75] 66  Resp:  [16-20] 20  SpO2:  [97 %-100 %] 98 %  BP: ()/(49-65) 95/51     Weight: 43.3 kg (95 lb 7.4 oz)  Body mass index is 16.39 kg/m².    Intake/Output Summary (Last 24 hours) at 1/4/2025 1159  Last data filed at 1/3/2025 1842  Gross per 24 hour   Intake 120 ml   Output --   Net 120 ml         Physical Exam  Vitals and nursing note reviewed.   Constitutional:       General: She is not in acute distress.     Appearance: She is not ill-appearing, toxic-appearing or diaphoretic.   HENT:      Head: Normocephalic and atraumatic.      Mouth/Throat:      Mouth: Mucous membranes are moist.   Eyes:      General: No scleral icterus.        Right eye: No discharge.         Left eye: No discharge.   Cardiovascular:      Rate and Rhythm: Normal rate and regular rhythm.      Heart sounds: Normal heart sounds.   Pulmonary:      Effort: Pulmonary effort is normal. No respiratory distress.      Breath sounds: Normal breath sounds.   Abdominal:      General: Bowel sounds are normal.       Palpations: Abdomen is soft.      Tenderness: There is no abdominal tenderness.   Musculoskeletal:      Cervical back: No rigidity.      Right lower leg: No edema.      Left lower leg: No edema.   Skin:     General: Skin is warm and dry.      Coloration: Skin is not jaundiced.   Neurological:      Mental Status: She is alert. Mental status is at baseline. She is confused.   Psychiatric:         Mood and Affect: Mood normal.         Behavior: Behavior normal.             Significant Labs: All pertinent labs within the past 24 hours have been reviewed.  BMP:   Recent Labs   Lab 01/04/25  1239   GLU 93      K 4.1      CO2 26   BUN 20   CREATININE 0.8   CALCIUM 9.4     CBC:   Recent Labs   Lab 01/04/25  1239   WBC 4.68   HGB 12.0   HCT 37.1          Significant Imaging: I have reviewed all pertinent imaging results/findings within the past 24 hours.

## 2025-01-04 NOTE — PLAN OF CARE
A241/A241 SVITLANACally Staples is a 79 y.o.female admitted on 12/24/2024 for UTI (urinary tract infection)   Code Status: Full Code MRN: [id number]   Review of patient's allergies indicates:   Allergen Reactions    Pcn [penicillins] Hives     Past Medical History:   Diagnosis Date    History of oral surgery     Hyperlipidemia     Hypertension     IBS (irritable bowel syndrome)     Rectocele     Recurrent UTI       PRN meds    dextrose 50%, 12.5 g, PRN  dextrose 50%, 25 g, PRN  docusate sodium, 100 mg, Daily PRN  glucagon (human recombinant), 1 mg, PRN  glucose, 16 g, PRN  glucose, 24 g, PRN  melatonin, 6 mg, Nightly PRN  naloxone, 0.02 mg, PRN  sodium chloride 0.9%, 10 mL, Q12H PRN  traZODone, 50 mg, Nightly PRN         Pt oriented x4.  VSS.  Pt remained afebrile throughout this shift.   All meds administered per order.   Pt remained free of falls this shift.   Plan of care reviewed. Patient verbalizes understanding.   Pt moving/turning independently.   Bed low, side rails up x 2, wheels locked, call light in reach.   Patient instructed to call for assistance.  Patient education provided      Chart check completed. Will continue plan of care.      Orientation: disoriented to, situation, time  Central Coma Scale Score: 15     Lead Monitored: other (see comments) (NO tele monitor ordered) Rhythm: normal sinus rhythm    Cardiac/Telemetry Box Number: [id number]  VTE Core Measure: Pharmacological prophylaxis initiated/maintained Last Bowel Movement: 01/03/26  Diet Adult Regular  Voiding Characteristics: frequency  Kraig Score: 22  Fall Risk Score: 9  Accucheck []   Freq?      Lines/Drains/Airways       None

## 2025-01-05 PROCEDURE — 25000003 PHARM REV CODE 250: Performed by: STUDENT IN AN ORGANIZED HEALTH CARE EDUCATION/TRAINING PROGRAM

## 2025-01-05 PROCEDURE — 63600175 PHARM REV CODE 636 W HCPCS: Performed by: INTERNAL MEDICINE

## 2025-01-05 PROCEDURE — G0378 HOSPITAL OBSERVATION PER HR: HCPCS

## 2025-01-05 PROCEDURE — 96372 THER/PROPH/DIAG INJ SC/IM: CPT | Performed by: INTERNAL MEDICINE

## 2025-01-05 RX ADMIN — BISOPROLOL FUMARATE AND HYDROCHLOROTHIAZIDE 1 TABLET: 6.25; 2.5 TABLET ORAL at 08:01

## 2025-01-05 RX ADMIN — ASPIRIN 81 MG: 81 TABLET, COATED ORAL at 08:01

## 2025-01-05 RX ADMIN — ENOXAPARIN SODIUM 30 MG: 30 INJECTION SUBCUTANEOUS at 04:01

## 2025-01-05 RX ADMIN — FLUTICASONE PROPIONATE 100 MCG: 50 SPRAY, METERED NASAL at 09:01

## 2025-01-05 NOTE — PROGRESS NOTES
[facility] Medicine  Progress Note    Patient Name: Lucy Staples  MRN: [id number]  Patient Class: OP- Observation   Admission Date: 2024  Length of Stay: 0 days  Attending Physician: Carmen Nguyen MD  Primary Care Provider: Lamin Webber MD        Subjective     Principal Problem:UTI (urinary tract infection)        HPI:  This is an 79-year-old woman who lives alone with hypertension, hyperlipidemia who was confused and called the police department, ultimately ended up in the hospital and was found to have evidence of a UTI     Patient is accompanied by her friend from [facility], Dominick Loco ([phone]) who explains patient has a good support network with friends and neighbors who help to take care of her at home by checking on her intermittently.  It is unclear what happened today, the patient does not remember, but per the ED notes the patient could not locate her keys and called the police department who then called for an ambulance.  The patient is asymptomatic at this time and of note denies dysuria or urinary frequency.  She states that she still drives and then explained that her license has .      It sounds like the home situation may not be tenable anymore as the patient likely has undiagnosed dementia and difficulty caring for herself.  Does have a PCP but does not see her regularly.  On the day of appointments she gets worried and then does not go.  Been and her friends have been trying to get her to see the doctor unsuccessfully.      In the ED, vitals notable for some hypertension with blood pressure is 160/70s.  CBC and CMP were normal.  Urinalysis with 11 WBCs per high-power field and some leukocyte esterase.  She was diagnosed with a urinary tract infection and referred for admission.  She was started on ciprofloxacin    Overview/Hospital Course:  The patient presented to the ED after calling the police. She was noted to have confusion. Police  department called EMS. Workup consistent with UTI. Patient placed on empiric antibiotics. Psychiatry consulted as patient does not appear to have capacity. Urine culture with multiple organisms. Repeat UA. Participated with therapy. 24 hour supervision recommended. Patient does not have 24 hour supervision. She has friends who check on her but are unable to provide 24 hour supervision. Skilled placement being pursued. Patient accepted to facility, awaiting insurance authorization.    Alert to self, in bed. Psych robe initially recommends discussing discharge options with patient, ideally with identified support person present. Wonder if intermediate option such as home health would be more in line with patient's expressed preferences. After more discussion with staff, patient's friends and caregiver, psych does not feel that patient currently has the capacity to make decisions regarding discharge plan due to likely major neurocognitive disorder, which is unlikely to improve with treatment of UTI : Able to state she was in hospital, but unable to say which or where. Some confusion regarding current events.CM working on insurance authorization for SNF placement.     continues to work on insurance verification for placement. Was denied SNF placement by insurance. CM will move to potential nursing home placement.    Interval History: Awake, confused, sitting in chair rummaging through her purse. No acute events over night reported by staff. No distress noted.    Review of Systems   Constitutional:  Negative for chills, fatigue and fever.   HENT:  Negative for congestion and rhinorrhea.    Respiratory:  Negative for cough and shortness of breath.    Cardiovascular:  Negative for chest pain and leg swelling.   Gastrointestinal:  Negative for abdominal pain, constipation, diarrhea, nausea and vomiting.   Genitourinary:  Negative for difficulty urinating and dysuria.   Musculoskeletal:  Negative for arthralgias  and back pain.   Skin:  Negative for rash and wound.   Neurological:  Negative for dizziness and headaches.     Objective:     Vital Signs (Most Recent):  Temp: 98.4 °F (36.9 °C) (01/05/25 1213)  Pulse: 69 (01/05/25 1213)  Resp: 19 (01/05/25 1213)  BP: 130/61 (01/05/25 1213)  SpO2: 98 % (01/05/25 1213) Vital Signs (24h Range):  Temp:  [98.2 °F (36.8 °C)-98.5 °F (36.9 °C)] 98.4 °F (36.9 °C)  Pulse:  [57-75] 69  Resp:  [19-20] 19  SpO2:  [98 %-99 %] 98 %  BP: ()/(50-61) 130/61     Weight: 43.3 kg (95 lb 7.4 oz)  Body mass index is 16.39 kg/m².  No intake or output data in the 24 hours ending 01/05/25 1259      Physical Exam  Vitals and nursing note reviewed.   Constitutional:       General: She is not in acute distress.     Appearance: She is not ill-appearing, toxic-appearing or diaphoretic.   HENT:      Head: Normocephalic and atraumatic.      Mouth/Throat:      Mouth: Mucous membranes are moist.   Eyes:      General: No scleral icterus.        Right eye: No discharge.         Left eye: No discharge.   Cardiovascular:      Rate and Rhythm: Normal rate and regular rhythm.      Heart sounds: Normal heart sounds.   Pulmonary:      Effort: Pulmonary effort is normal. No respiratory distress.      Breath sounds: Normal breath sounds.   Abdominal:      General: Bowel sounds are normal.      Palpations: Abdomen is soft.      Tenderness: There is no abdominal tenderness.   Musculoskeletal:      Cervical back: No rigidity.      Right lower leg: No edema.      Left lower leg: No edema.   Skin:     General: Skin is warm and dry.      Coloration: Skin is not jaundiced.   Neurological:      Mental Status: She is oriented to person, place, and time. Mental status is at baseline.   Psychiatric:         Mood and Affect: Mood normal.         Behavior: Behavior normal.             Significant Labs: All pertinent labs within the past 24 hours have been reviewed.  BMP:   Recent Labs   Lab 01/04/25  1239   GLU 93      K 4.1       CO2 26   BUN 20   CREATININE 0.8   CALCIUM 9.4     CBC:   Recent Labs   Lab 01/04/25  1239   WBC 4.68   HGB 12.0   HCT 37.1          Significant Imaging: I have reviewed all pertinent imaging results/findings within the past 24 hours.    Assessment and Plan     * UTI (urinary tract infection)  Complete course of antibiotics 12/28 and .  Resolved.      Moderate malnutrition  Nutrition consulted. Most recent weight and BMI monitored-     Measurements:  Wt Readings from Last 1 Encounters:   01/03/25 43.3 kg (95 lb 7.4 oz)   Body mass index is 16.39 kg/m².    Patient has been screened and assessed by RD.    Malnutrition Type:  Context: chronic illness  Level: severe    Malnutrition Characteristic Summary:  Weight Loss (Malnutrition): 20% in 1 year  Energy Intake (Malnutrition): less than or equal to 75% for greater than or equal to 1 month  Subcutaneous Fat (Malnutrition): severe depletion  Muscle Mass (Malnutrition): severe depletion    Interventions/Recommendations (treatment strategy):  1. Liberalized regular diet 2. Commercial beverage          Dementia    -confusional state noted in last few medical encounters  -psych evaluated: Recommend discussing discharge options with patient, ideally with identified support person present. Wonder if intermediate option such as home health would be more in line with patient's expressed preferences. Do not feel that patient currently has the capacity to make decisions regarding discharge plan due to likely major neurocognitive disorder, which is unlikely to improve with treatment of UTI   3. Recommend outpatient follow up with neurology for dementia evaluation. , lacks capacity   working on insurance approval for SNF placement.    OP follow-up with neurology set up for dishcarge    Hypertension  Patient's blood pressure range in the last 24 hours was: BP  Min: 96/50  Max: 130/61.The patient's inpatient anti-hypertensive regimen is listed  below:  Current Antihypertensives  bisoprolol-hydrochlorothiazide 2.5-6.25 mg per tablet 1 tablet, Daily, Oral    Will set up with PCP for medication management at discharge       VTE Risk Mitigation (From admission, onward)           Ordered     enoxaparin injection 30 mg  Daily         12/30/24 0808     IP VTE HIGH RISK PATIENT  Once         12/24/24 1720     Place sequential compression device  Until discontinued         12/24/24 1720                    Discharge Planning   MEJIA:      Code Status: Full Code   Medical Readiness for Discharge Date:   Discharge Plan A: Skilled Nursing Facility   Discharge Delays: None known at this time            Please place Justification for DME        SONDRA DominiqeuC  Department of Hospital Medicine   [facility] - Telemetry ([facility])

## 2025-01-05 NOTE — ASSESSMENT & PLAN NOTE
Nutrition consulted. Most recent weight and BMI monitored-     Measurements:  Wt Readings from Last 1 Encounters:   01/03/25 43.3 kg (95 lb 7.4 oz)   Body mass index is 16.39 kg/m².    Patient has been screened and assessed by RD.    Malnutrition Type:  Context: chronic illness  Level: severe    Malnutrition Characteristic Summary:  Weight Loss (Malnutrition): 20% in 1 year  Energy Intake (Malnutrition): less than or equal to 75% for greater than or equal to 1 month  Subcutaneous Fat (Malnutrition): severe depletion  Muscle Mass (Malnutrition): severe depletion    Interventions/Recommendations (treatment strategy):  1. Liberalized regular diet 2. Commercial beverage

## 2025-01-05 NOTE — ASSESSMENT & PLAN NOTE
Patient's blood pressure range in the last 24 hours was: BP  Min: 96/50  Max: 130/61.The patient's inpatient anti-hypertensive regimen is listed below:  Current Antihypertensives  bisoprolol-hydrochlorothiazide 2.5-6.25 mg per tablet 1 tablet, Daily, Oral    Will set up with PCP for medication management at discharge

## 2025-01-05 NOTE — PLAN OF CARE
"Confused  NAD  VSS    Persistent OOB without requesting staff assistance. Redirected back to bed, appeared to rest comfortably throughout majority of shift, with intermittent OOB episodes.     Bed in low & locked position with call light in reach. Continuing to monitor. 24h cc complete.    BP (!) 103/54   Pulse (!) 57   Temp 98.2 °F (36.8 °C)   Resp 20   Ht 5' 4" (1.626 m)   Wt 43.3 kg (95 lb 7.4 oz)   SpO2 98%   BMI 16.39 kg/m²     "

## 2025-01-05 NOTE — ASSESSMENT & PLAN NOTE
-confusional state noted in last few medical encounters  -psych evaluated: Recommend discussing discharge options with patient, ideally with identified support person present. Wonder if intermediate option such as home health would be more in line with patient's expressed preferences. Do not feel that patient currently has the capacity to make decisions regarding discharge plan due to likely major neurocognitive disorder, which is unlikely to improve with treatment of UTI   3. Recommend outpatient follow up with neurology for dementia evaluation. , lacks capacity   working on insurance approval for SNF placement.    OP follow-up with neurology set up for [facility]

## 2025-01-05 NOTE — SUBJECTIVE & OBJECTIVE
Interval History: Awake, confused, sitting in chair rummaging through her purse. No acute events over night reported by staff. No distress noted.    Review of Systems   Constitutional:  Negative for chills, fatigue and fever.   HENT:  Negative for congestion and rhinorrhea.    Respiratory:  Negative for cough and shortness of breath.    Cardiovascular:  Negative for chest pain and leg swelling.   Gastrointestinal:  Negative for abdominal pain, constipation, diarrhea, nausea and vomiting.   Genitourinary:  Negative for difficulty urinating and dysuria.   Musculoskeletal:  Negative for arthralgias and back pain.   Skin:  Negative for rash and wound.   Neurological:  Negative for dizziness and headaches.     Objective:     Vital Signs (Most Recent):  Temp: 98.4 °F (36.9 °C) (01/05/25 1213)  Pulse: 69 (01/05/25 1213)  Resp: 19 (01/05/25 1213)  BP: 130/61 (01/05/25 1213)  SpO2: 98 % (01/05/25 1213) Vital Signs (24h Range):  Temp:  [98.2 °F (36.8 °C)-98.5 °F (36.9 °C)] 98.4 °F (36.9 °C)  Pulse:  [57-75] 69  Resp:  [19-20] 19  SpO2:  [98 %-99 %] 98 %  BP: ()/(50-61) 130/61     Weight: 43.3 kg (95 lb 7.4 oz)  Body mass index is 16.39 kg/m².  No intake or output data in the 24 hours ending 01/05/25 1259      Physical Exam  Vitals and nursing note reviewed.   Constitutional:       General: She is not in acute distress.     Appearance: She is not ill-appearing, toxic-appearing or diaphoretic.   HENT:      Head: Normocephalic and atraumatic.      Mouth/Throat:      Mouth: Mucous membranes are moist.   Eyes:      General: No scleral icterus.        Right eye: No discharge.         Left eye: No discharge.   Cardiovascular:      Rate and Rhythm: Normal rate and regular rhythm.      Heart sounds: Normal heart sounds.   Pulmonary:      Effort: Pulmonary effort is normal. No respiratory distress.      Breath sounds: Normal breath sounds.   Abdominal:      General: Bowel sounds are normal.      Palpations: Abdomen is soft.       Tenderness: There is no abdominal tenderness.   Musculoskeletal:      Cervical back: No rigidity.      Right lower leg: No edema.      Left lower leg: No edema.   Skin:     General: Skin is warm and dry.      Coloration: Skin is not jaundiced.   Neurological:      Mental Status: She is oriented to person, place, and time. Mental status is at baseline.   Psychiatric:         Mood and Affect: Mood normal.         Behavior: Behavior normal.             Significant Labs: All pertinent labs within the past 24 hours have been reviewed.  BMP:   Recent Labs   Lab 01/04/25  1239   GLU 93      K 4.1      CO2 26   BUN 20   CREATININE 0.8   CALCIUM 9.4     CBC:   Recent Labs   Lab 01/04/25  1239   WBC 4.68   HGB 12.0   HCT 37.1          Significant Imaging: I have reviewed all pertinent imaging results/findings within the past 24 hours.

## 2025-01-06 PROBLEM — R41.89 IMPAIRED COGNITION: Status: ACTIVE | Noted: 2024-12-24

## 2025-01-06 LAB
FOLATE SERPL-MCNC: 7.9 NG/ML (ref 4–24)
TREPONEMA PALLIDUM IGG+IGM AB [PRESENCE] IN SERUM OR PLASMA BY IMMUNOASSAY: NONREACTIVE
VIT B12 SERPL-MCNC: 194 PG/ML (ref 210–950)

## 2025-01-06 PROCEDURE — 82607 VITAMIN B-12: CPT | Performed by: FAMILY MEDICINE

## 2025-01-06 PROCEDURE — 96372 THER/PROPH/DIAG INJ SC/IM: CPT | Performed by: INTERNAL MEDICINE

## 2025-01-06 PROCEDURE — 86593 SYPHILIS TEST NON-TREP QUANT: CPT | Performed by: FAMILY MEDICINE

## 2025-01-06 PROCEDURE — G0378 HOSPITAL OBSERVATION PER HR: HCPCS

## 2025-01-06 PROCEDURE — 83520 IMMUNOASSAY QUANT NOS NONAB: CPT | Performed by: FAMILY MEDICINE

## 2025-01-06 PROCEDURE — 82746 ASSAY OF FOLIC ACID SERUM: CPT | Performed by: FAMILY MEDICINE

## 2025-01-06 PROCEDURE — 36415 COLL VENOUS BLD VENIPUNCTURE: CPT | Performed by: FAMILY MEDICINE

## 2025-01-06 PROCEDURE — 63600175 PHARM REV CODE 636 W HCPCS: Performed by: INTERNAL MEDICINE

## 2025-01-06 PROCEDURE — 25000003 PHARM REV CODE 250: Performed by: STUDENT IN AN ORGANIZED HEALTH CARE EDUCATION/TRAINING PROGRAM

## 2025-01-06 PROCEDURE — 25000003 PHARM REV CODE 250: Performed by: NURSE PRACTITIONER

## 2025-01-06 PROCEDURE — 82234 BETA-AMYLOID 1-42 (ABETA 42): CPT | Performed by: FAMILY MEDICINE

## 2025-01-06 RX ADMIN — FLUTICASONE PROPIONATE 100 MCG: 50 SPRAY, METERED NASAL at 09:01

## 2025-01-06 RX ADMIN — ASPIRIN 81 MG: 81 TABLET, COATED ORAL at 09:01

## 2025-01-06 RX ADMIN — TRAZODONE HYDROCHLORIDE 50 MG: 50 TABLET ORAL at 12:01

## 2025-01-06 RX ADMIN — ENOXAPARIN SODIUM 30 MG: 30 INJECTION SUBCUTANEOUS at 04:01

## 2025-01-06 RX ADMIN — BISOPROLOL FUMARATE AND HYDROCHLOROTHIAZIDE 1 TABLET: 6.25; 2.5 TABLET ORAL at 09:01

## 2025-01-06 NOTE — PROGRESS NOTES
[facility] Medicine  Progress Note    Patient Name: Lucy Staples  MRN: [id number]  Patient Class: OP- Observation   Admission Date: 2024  Length of Stay: 0 days  Attending Physician: Carmen Nguyen MD  Primary Care Provider: Lamin Webber MD        Subjective     Principal Problem:UTI (urinary tract infection)        HPI:  This is an 79-year-old woman who lives alone with hypertension, hyperlipidemia who was confused and called the police department, ultimately ended up in the hospital and was found to have evidence of a UTI     Patient is accompanied by her friend from [facility], Dominick Loco ([phone]) who explains patient has a good support network with friends and neighbors who help to take care of her at home by checking on her intermittently.  It is unclear what happened today, the patient does not remember, but per the ED notes the patient could not locate her keys and called the police department who then called for an ambulance.  The patient is asymptomatic at this time and of note denies dysuria or urinary frequency.  She states that she still drives and then explained that her license has .      It sounds like the home situation may not be tenable anymore as the patient likely has undiagnosed dementia and difficulty caring for herself.  Does have a PCP but does not see her regularly.  On the day of appointments she gets worried and then does not go.  Been and her friends have been trying to get her to see the doctor unsuccessfully.      In the ED, vitals notable for some hypertension with blood pressure is 160/70s.  CBC and CMP were normal.  Urinalysis with 11 WBCs per high-power field and some leukocyte esterase.  She was diagnosed with a urinary tract infection and referred for admission.  She was started on ciprofloxacin    Overview/Hospital Course:  The patient presented to the ED after calling the police. She was noted to have confusion. Police  department called EMS. Workup consistent with UTI. Patient placed on empiric antibiotics. Psychiatry consulted as patient does not appear to have capacity. Urine culture with multiple organisms. Repeat UA. Participated with therapy. 24 hour supervision recommended. Patient does not have 24 hour supervision. She has friends who check on her but are unable to provide 24 hour supervision. Skilled placement being pursued. Patient accepted to facility, awaiting insurance authorization.    Alert to self, in bed. Psych robe initially recommends discussing discharge options with patient, ideally with identified support person present. Wonder if intermediate option such as home health would be more in line with patient's expressed preferences. After more discussion with staff, patient's friends and caregiver, psych does not feel that patient currently has the capacity to make decisions regarding discharge plan due to likely major neurocognitive disorder, which is unlikely to improve with treatment of UTI : Able to state she was in hospital, but unable to say which or where. Some confusion regarding current events.CM working on insurance authorization for SNF placement.     continues to work on insurance verification for placement. Was denied SNF placement by insurance. CM will move to potential nursing home placement.  Due to lack of PCP follow up patient does not have an official diagnosis of dementia although shows clear signs of dementia.  Workup to rule out other causes of altered mental status including TSH, RPR, tau protein, amyloid, RPR, folate level, and B12 has been ordered.  Neurology was consulted and discussion was held with the neurologist who stated that an inpatient diagnosis of dementia is not possible and therefore would need to follow up as an outpatient with Neurology.  Given her situation with lack of family, patient is not a safe discharge home.  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  She roams the halls and therefore a risk for elopement at a nursing home without a memory care unit.  She is pleasantly demented and is not aggressive or agitated.    Interval History:  Follow up for UTI.  Patient's UTI has been cleared but is awaiting nursing home placement due to advance impaired cognition.  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  Patient has no new complaints    Review of Systems  Objective:     Vital Signs (Most Recent):  Temp: 98 °F (36.7 °C) (01/06/25 1236)  Pulse: 69 (01/06/25 1236)  Resp: 16 (01/06/25 1236)  BP: (!) 109/55 (01/06/25 1236)  SpO2: 100 % (01/06/25 1236) Vital Signs (24h Range):  Temp:  [97.9 °F (36.6 °C)-98.5 °F (36.9 °C)] 98 °F (36.7 °C)  Pulse:  [67-73] 69  Resp:  [16-19] 16  SpO2:  [90 %-100 %] 100 %  BP: (109-125)/(55-60) 109/55     Weight: 43.3 kg (95 lb 7.4 oz)  Body mass index is 16.39 kg/m².  No intake or output data in the 24 hours ending 01/06/25 1536      Physical Exam  Vitals and nursing note reviewed.   Constitutional:       Appearance: Normal appearance.   HENT:      Head: Normocephalic and atraumatic.      Nose: Nose normal.      Mouth/Throat:      Mouth: Mucous membranes are moist.   Eyes:      Extraocular Movements: Extraocular movements intact.      Conjunctiva/sclera: Conjunctivae normal.   Cardiovascular:      Rate and Rhythm: Normal rate and regular rhythm.      Pulses: Normal pulses.      Heart sounds: Normal heart sounds.   Pulmonary:      Effort: Pulmonary effort is normal.      Breath sounds: Normal breath sounds.   Abdominal:      General: Abdomen is flat. Bowel sounds are normal.      Palpations: Abdomen is soft.   Musculoskeletal:         General: Normal range of motion.      Cervical back: Normal range of motion and neck supple.   Skin:     General: Skin is warm.      Capillary " Refill: Capillary refill takes less than 2 seconds.   Neurological:      Mental Status: She is alert and oriented to person, place, and time. Mental status is at baseline.   Psychiatric:         Mood and Affect: Mood normal.         Behavior: Behavior normal.             Significant Labs: All pertinent labs within the past 24 hours have been reviewed.  Recent Lab Results         01/06/25  0442        Folate 7.9       Treponema Pallidum Antibodies (IgG, IgM) Nonreactive       Vitamin B12 194               Significant Imaging:   X-Ray Chest AP Portable   Final Result      No acute process seen.         Electronically signed by: Thien Peoples MD   Date:    12/24/2024   Time:    14:02      CT Head Without Contrast   Final Result      Chronic microvascular ischemic changes.         Electronically signed by: Thien Peoples MD   Date:    12/24/2024   Time:    13:34           Assessment and Plan     * UTI (urinary tract infection)  Complete course of antibiotics 12/28.  Resolved.      Moderate malnutrition  Nutrition consulted. Most recent weight and BMI monitored-     Measurements:  Wt Readings from Last 1 Encounters:   01/03/25 43.3 kg (95 lb 7.4 oz)   Body mass index is 16.39 kg/m².    Patient has been screened and assessed by RD.    Malnutrition Type:  Context: chronic illness  Level: severe    Malnutrition Characteristic Summary:  Weight Loss (Malnutrition): 20% in 1 year  Energy Intake (Malnutrition): less than or equal to 75% for greater than or equal to 1 month  Subcutaneous Fat (Malnutrition): severe depletion  Muscle Mass (Malnutrition): severe depletion    Interventions/Recommendations (treatment strategy):  1. Liberalized regular diet 2. Commercial beverage          Impaired cognition  -confusional state noted in last few medical encounters  -psych evaluated: Recommend discussing discharge options with patient, ideally with identified support person present. Wonder if intermediate option such as home health would  be more in line with patient's expressed preferences. Do not feel that patient currently has the capacity to make decisions regarding discharge plan due to likely major neurocognitive disorder, which is unlikely to improve with treatment of UTI   3. Recommend outpatient follow up with neurology for dementia evaluation. , lacks capacity  [facility] placement denied.  Case management working on nursing home placement but given her impaired cognition without an official diagnosis of dementia patient would only be able to be admitted to the nursing home portion without memory care.  Patient will need to see Neurology as an outpatient for an official diagnosis although patient is clinically demented based on last 13 days of admission to the hospital.  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    OP follow-up with neurology set up for dishcarge    Hypertension  Patient's blood pressure range in the last 24 hours was: BP  Min: 109/55  Max: 125/59.The patient's inpatient anti-hypertensive regimen is listed below:  Current Antihypertensives  bisoprolol-hydrochlorothiazide 2.5-6.25 mg per tablet 1 tablet, Daily, Oral    Will set up with PCP for medication management at discharge       VTE Risk Mitigation (From admission, onward)           Ordered     enoxaparin injection 30 mg  Daily         12/30/24 0808     IP VTE HIGH RISK PATIENT  Once         12/24/24 1720     Place sequential compression device  Until discontinued         12/24/24 1720                    Discharge Planning   MEJIA:      Code Status: Full Code   Medical Readiness for Discharge Date:   Discharge Plan A: Skilled Nursing Facility   Discharge Delays: None known at this time            Please place Justification for DME        Carmen Nguyen MD  Department of Hospital Medicine   [facility] -  Telemetry ([facility]

## 2025-01-06 NOTE — ASSESSMENT & PLAN NOTE
Patient's blood pressure range in the last 24 hours was: BP  Min: 109/55  Max: 125/59.The patient's inpatient anti-hypertensive regimen is listed below:  Current Antihypertensives  bisoprolol-hydrochlorothiazide 2.5-6.25 mg per tablet 1 tablet, Daily, Oral    Will set up with PCP for medication management at discharge

## 2025-01-06 NOTE — SUBJECTIVE & OBJECTIVE
Interval History:  Follow up for UTI.  Patient's UTI has been cleared but is awaiting nursing home placement due to advance impaired cognition.  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  Patient has no new complaints    Review of Systems  Objective:     Vital Signs (Most Recent):  Temp: 98 °F (36.7 °C) (01/06/25 1236)  Pulse: 69 (01/06/25 1236)  Resp: 16 (01/06/25 1236)  BP: (!) 109/55 (01/06/25 1236)  SpO2: 100 % (01/06/25 1236) Vital Signs (24h Range):  Temp:  [97.9 °F (36.6 °C)-98.5 °F (36.9 °C)] 98 °F (36.7 °C)  Pulse:  [67-73] 69  Resp:  [16-19] 16  SpO2:  [90 %-100 %] 100 %  BP: (109-125)/(55-60) 109/55     Weight: 43.3 kg (95 lb 7.4 oz)  Body mass index is 16.39 kg/m².  No intake or output data in the 24 hours ending 01/06/25 1536      Physical Exam  Vitals and nursing note reviewed.   Constitutional:       Appearance: Normal appearance.   HENT:      Head: Normocephalic and atraumatic.      Nose: Nose normal.      Mouth/Throat:      Mouth: Mucous membranes are moist.   Eyes:      Extraocular Movements: Extraocular movements intact.      Conjunctiva/sclera: Conjunctivae normal.   Cardiovascular:      Rate and Rhythm: Normal rate and regular rhythm.      Pulses: Normal pulses.      Heart sounds: Normal heart sounds.   Pulmonary:      Effort: Pulmonary effort is normal.      Breath sounds: Normal breath sounds.   Abdominal:      General: Abdomen is flat. Bowel sounds are normal.      Palpations: Abdomen is soft.   Musculoskeletal:         General: Normal range of motion.      Cervical back: Normal range of motion and neck supple.   Skin:     General: Skin is warm.      Capillary Refill: Capillary refill takes less than 2 seconds.   Neurological:      Mental Status: She is alert and oriented to person, place, and time. Mental status is at baseline.   Psychiatric:         Mood and Affect: Mood normal.         Behavior: Behavior normal.             Significant Labs:  All pertinent labs within the past 24 hours have been reviewed.  Recent Lab Results         01/06/25  0442        Folate 7.9       Treponema Pallidum Antibodies (IgG, IgM) Nonreactive       Vitamin B12 194               Significant Imaging:   X-Ray Chest AP Portable   Final Result      No acute process seen.         Electronically signed by: Thien Peoples MD   Date:    12/24/2024   Time:    14:02      CT Head Without Contrast   Final Result      Chronic microvascular ischemic changes.         Electronically signed by: Thien Peoples MD   Date:    12/24/2024   Time:    13:34

## 2025-01-06 NOTE — PLAN OF CARE
Pending acceptance to [facility] for [facility] Placement.     Patient's Long-Term Medicaid application and 3 months of bank statements have been sent to [facility].  Patient's PASRR/ 142 has been uploaded to EPIC.     15 06 Per Sunday, [facility]  is reviewing Patient's financial information for acceptance.     SW will continue to follow and assist as needed.

## 2025-01-06 NOTE — ASSESSMENT & PLAN NOTE
-confusional state noted in last few medical encounters  -psych evaluated: Recommend discussing discharge options with patient, ideally with identified support person present. Wonder if intermediate option such as home health would be more in line with patient's expressed preferences. Do not feel that patient currently has the capacity to make decisions regarding discharge plan due to likely major neurocognitive disorder, which is unlikely to improve with treatment of UTI   3. Recommend outpatient follow up with neurology for dementia evaluation. , lacks capacity  [facility] placement denied.  Case management working on nursing home placement but given her impaired cognition without an official diagnosis of dementia patient would only be able to be admitted to the nursing home portion without memory care.  Patient will need to see Neurology as an outpatient for an official diagnosis although patient is clinically demented based on last 13 days of admission to the hospital.  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    OP follow-up with neurology set up for [facility]

## 2025-01-06 NOTE — PLAN OF CARE
Problem: Adult Inpatient Plan of Care  Goal: Plan of Care Review  Outcome: Progressing  Goal: Patient-Specific Goal (Individualized)  Outcome: Progressing  Goal: Absence of Hospital-Acquired Illness or Injury  Outcome: Progressing  Goal: Optimal Comfort and Wellbeing  Outcome: Progressing  Goal: Readiness for Transition of Care  Outcome: Progressing     Problem: Fall Injury Risk  Goal: Absence of Fall and Fall-Related Injury  Outcome: Progressing     Problem: UTI (Urinary Tract Infection)  Goal: Improved Infection Symptoms  Outcome: Progressing     Problem: Confusion Acute  Goal: Optimal Cognitive Function  Outcome: Progressing     Problem: Skin Injury Risk Increased  Goal: Skin Health and Integrity  Outcome: Progressing     Problem: Wound  Goal: Optimal Coping  Outcome: Progressing  Goal: Optimal Functional Ability  Outcome: Progressing  Goal: Absence of Infection Signs and Symptoms  Outcome: Progressing  Goal: Improved Oral Intake  Outcome: Progressing  Goal: Optimal Pain Control and Function  Outcome: Progressing  Goal: Skin Health and Integrity  Outcome: Progressing  Goal: Optimal Wound Healing  Outcome: Progressing     Problem: Fatigue  Goal: Improved Activity Tolerance  Outcome: Progressing

## 2025-01-06 NOTE — CONSULTS
79-year-old woman with HTN, HLD with admission concerns for encephalopathy / with metabolic / systemic infectious - UTI triggers. Low suspicion for focal cerebrovascular ischemic event or epileptic or neuro inflammatory etiology. Background includes cognitive decline and dysfunction over the last 2 years. And neurology called in for dementia evaluation.      Recommend OP neurology evaluation for comprehensive assessment tests in attention, concentration, memory, problem-solving and verbal skills for further management. Low suspicion for urgent attention in terms of any focal cerebrovascular ischemic event or epileptic or neuro inflammatory etiology as mimics.      Continue intermin encephalopathy evaluation and management as per primary   -- Correct lytes as needed / investigate and control infection if any / avoid hypoxia or hypercapnia / avoid hypoglycemia / control uremia - avoid rapid correction / avoid-correct metabolic-respiratory acidosis or alkalosis   -- Correct any nutritional deficiencies / correct anemia / provide nutritional support as appropriate / ambulate when appropriate   -- PT/OT evaluation and management   -- delirium precautions            Ramos Cline MD    General Neurology, [facility],   [address] [facility] [address], [address], [facility] [place] [address]    Vascular Neurology, Endovascular Neurosurgery,   [facility], [address] [place] [id number]

## 2025-01-07 PROCEDURE — 63600175 PHARM REV CODE 636 W HCPCS: Performed by: INTERNAL MEDICINE

## 2025-01-07 PROCEDURE — 97530 THERAPEUTIC ACTIVITIES: CPT

## 2025-01-07 PROCEDURE — 96372 THER/PROPH/DIAG INJ SC/IM: CPT | Performed by: INTERNAL MEDICINE

## 2025-01-07 PROCEDURE — 97110 THERAPEUTIC EXERCISES: CPT

## 2025-01-07 PROCEDURE — 25000003 PHARM REV CODE 250: Performed by: STUDENT IN AN ORGANIZED HEALTH CARE EDUCATION/TRAINING PROGRAM

## 2025-01-07 PROCEDURE — 25000003 PHARM REV CODE 250: Performed by: NURSE PRACTITIONER

## 2025-01-07 PROCEDURE — G0378 HOSPITAL OBSERVATION PER HR: HCPCS

## 2025-01-07 RX ADMIN — TRAZODONE HYDROCHLORIDE 50 MG: 50 TABLET ORAL at 08:01

## 2025-01-07 RX ADMIN — ASPIRIN 81 MG: 81 TABLET, COATED ORAL at 08:01

## 2025-01-07 RX ADMIN — ENOXAPARIN SODIUM 30 MG: 30 INJECTION SUBCUTANEOUS at 04:01

## 2025-01-07 RX ADMIN — BISOPROLOL FUMARATE AND HYDROCHLOROTHIAZIDE 1 TABLET: 6.25; 2.5 TABLET ORAL at 12:01

## 2025-01-07 RX ADMIN — FLUTICASONE PROPIONATE 100 MCG: 50 SPRAY, METERED NASAL at 08:01

## 2025-01-07 RX ADMIN — TRAZODONE HYDROCHLORIDE 50 MG: 50 TABLET ORAL at 12:01

## 2025-01-07 NOTE — PT/OT/SLP PROGRESS
Occupational Therapy  Treatment    Lucy Staples   MRN: [id number]   Admitting Diagnosis: UTI (urinary tract infection)    OT Date of Treatment: 01/21/25   OT Start Time: 1111  OT Stop Time: 1136  OT Total Time (min): 25 min    Billable Minutes:  Therapeutic Activity 15 MINUTES and Therapeutic Exercise 10 MINUTES    OT/INDIO: OT     Number of INDIO visits since last OT visit: 1    General Precautions: Standard, fall  Orthopedic Precautions: N/A  Braces: N/A  Respiratory Status: Room air         Subjective:  Communicated with nurse and epic chart review prior to session.    Pain/Comfort  Pain Rating 1: 0/10    Objective:        Functional Mobility:  Functional Ambulation:   (S) to mod (I) WITH FUNCTIONAL MOBILITY X 350 FEET WITH NO AE  Balance:   Static Stand: GOOD-: Takes MODERATE challenges from all directions inconsistently  Dynamic stand: GOOD-: Needs SUPERVISION only during gait and able to self right with moderate LOB inconsistently    Therapeutic Activities and Exercises:  Educated patient on importance of increased tolerance to upright position and direct impact on CV endurance and strength. Patient encouraged to sit up in chair/ EOB, for a minimum of 2 consecutive hours including for all meals. Pt educated on HEP. Pt performed 1 set x 15 reps b ue rom exercise ( shoulder flexion, chest press, elbow flexion/ ext, hand/digits flex/ext) with good standing balance Encouraged patient to perform AROM TE to BUE throughout the day within all available planes of motion. Re enforced importance of utilizing call light to meet needs in room and not attempt to get up without staff assistance. Patient verbalized understanding and agreed to comply.         AM-PAC 6 CLICK ADL   How much help from another person does this patient currently need?   1 = Unable, Total/Dependent Assistance  2 = A lot, Maximum/Moderate Assistance  3 = A little, Minimum/Contact Guard/Supervision  4 = None, Modified  "[place]/Independent    Putting on and taking off regular lower body clothing? : 3  Bathing (including washing, rinsing, drying)?: 3  Toileting, which includes using toilet, bedpan, or urinal? : 3  Putting on and taking off regular upper body clothing?: 3  Taking care of personal grooming such as brushing teeth?: 4  Eating meals?: 4  Daily Activity Total Score: 20     AM-PAC Raw Score CMS "G-Code Modifier Level of Impairment Assistance   6 % Total / Unable   7 - 8 CM 80 - 100% Maximal Assist   9-13 CL 60 - 80% Moderate Assist   14 - 19 CK 40 - 60% Moderate Assist   20 - 22 CJ 20 - 40% Minimal Assist   23 CI 1-20% SBA / CGA   24 CH 0% Independent/ Mod I       Patient left ambulatory in room/milan with all lines intact, call button in reach, and nurse notified    ASSESSMENT:  Lucy Staples is a 79 y.o. female with a medical diagnosis of UTI (urinary tract infection) and presents with debility and generalized weakness.    Rehab identified problem list/impairments:  weakness, impaired endurance, impaired self care skills, decreased upper extremity function, impaired functional mobility, decreased lower extremity function, gait instability, decreased safety awareness, impaired balance, pain    Rehab potential is good.    Activity tolerance: Good    Discharge recommendations: No Therapy Indicated   Barriers to discharge:      Equipment recommendations: none    GOALS:   Multidisciplinary Problems       Occupational Therapy Goals          Problem: Occupational Therapy    Goal Priority Disciplines Outcome Interventions   Occupational Therapy Goal     OT, PT/OT Progressing    Description: Goals to be met by: 1/9/25     Patient will increase functional independence with ADLs by performing:    UE Dressing with [place].  Grooming while standing at sink with [place].  Toileting from toilet with [place] for hygiene and clothing management.   Toilet transfer to toilet with [place].  Upper " extremity exercise program x15 reps per handout, with independence.                         Plan:  Patient to be seen 2 x/week to address the above listed problems via self-care/home management, therapeutic activities, therapeutic exercises  Plan of Care expires: 01/19/25  Plan of Care reviewed with: patient         01/07/2025

## 2025-01-07 NOTE — SUBJECTIVE & OBJECTIVE
Interval History:  No acute issues reported overnight.    Review of Systems   Unable to perform ROS: Dementia     Objective:     Vital Signs (Most Recent):  Temp: 97.8 °F (36.6 °C) (01/07/25 0728)  Pulse: 65 (01/07/25 0728)  Resp: 16 (01/07/25 0728)  BP: (!) 119/56 (01/07/25 0728)  SpO2: 96 % (01/07/25 0728) Vital Signs (24h Range):  Temp:  [97.8 °F (36.6 °C)-98.5 °F (36.9 °C)] 97.8 °F (36.6 °C)  Pulse:  [65-73] 65  Resp:  [16-18] 16  SpO2:  [96 %-100 %] 96 %  BP: (109-119)/(55-64) 119/56     Weight: 43.3 kg (95 lb 7.4 oz)  Body mass index is 16.39 kg/m².  No intake or output data in the 24 hours ending 01/07/25 1048      Physical Exam  Vitals and nursing note reviewed.   Constitutional:       General: She is not in acute distress.     Appearance: Normal appearance. She is well-developed. She is not diaphoretic.   HENT:      Head: Normocephalic and atraumatic.      Nose: Nose normal.      Mouth/Throat:      Mouth: Mucous membranes are moist.   Eyes:      Extraocular Movements: Extraocular movements intact.      Conjunctiva/sclera: Conjunctivae normal.      Pupils: Pupils are equal, round, and reactive to light.   Cardiovascular:      Rate and Rhythm: Normal rate and regular rhythm.      Pulses: Normal pulses.      Heart sounds: Normal heart sounds. No murmur heard.     No friction rub. No gallop.   Pulmonary:      Effort: Pulmonary effort is normal. No respiratory distress.      Breath sounds: Normal breath sounds. No stridor. No wheezing or rales.   Abdominal:      General: Abdomen is flat. Bowel sounds are normal. There is no distension.      Palpations: Abdomen is soft. There is no mass.      Tenderness: There is no abdominal tenderness. There is no guarding.   Musculoskeletal:         General: Normal range of motion.      Cervical back: Normal range of motion and neck supple.   Skin:     General: Skin is warm.      Capillary Refill: Capillary refill takes less than 2 seconds.      Findings: No erythema.    Neurological:      Mental Status: She is alert. Mental status is at baseline. She is disoriented.   Psychiatric:         Mood and Affect: Mood normal.         Behavior: Behavior normal.             Significant Labs: All pertinent labs within the past 24 hours have been reviewed.    Significant Imaging: I have reviewed all pertinent imaging results/findings within the past 24 hours.

## 2025-01-07 NOTE — PLAN OF CARE
Problem: Adult Inpatient Plan of Care  Goal: Plan of Care Review  Outcome: Progressing     Problem: Adult Inpatient Plan of Care  Goal: Absence of Hospital-Acquired Illness or Injury  Outcome: Progressing     Problem: Fall Injury Risk  Goal: Absence of Fall and Fall-Related Injury  Outcome: Progressing     Problem: Confusion Acute  Goal: Optimal Cognitive Function  Outcome: Progressing     Problem: Wound  Goal: Optimal Coping  Outcome: Progressing  POC reviewed with pt. Pt verbalizes understanding of POC. No questions at this time.  AAOx2. NADN.  No orders for cardiac monitoring   Pt remains free of falls.  No complaints at this time.  Safety measures in place. Will continue to monitor.  Informed pt to call for assistance before getting up. Pt verbalizes understanding.  Hourly rounding and chart check complete.

## 2025-01-07 NOTE — PROGRESS NOTES
[facility] Medicine  Progress Note    Patient Name: Lucy Staples  MRN: [id number]  Patient Class: OP- Observation   Admission Date: 2024  Length of Stay: 0 days  Attending Physician: Jam Roche MD  Primary Care Provider: Lamin Webber MD        Subjective     Principal Problem:UTI (urinary tract infection)        HPI:  This is an 79-year-old woman who lives alone with hypertension, hyperlipidemia who was confused and called the police department, ultimately ended up in the hospital and was found to have evidence of a UTI     Patient is accompanied by her friend from [facility], Dominick Loco ([phone]) who explains patient has a good support network with friends and neighbors who help to take care of her at home by checking on her intermittently.  It is unclear what happened today, the patient does not remember, but per the ED notes the patient could not locate her keys and called the police department who then called for an ambulance.  The patient is asymptomatic at this time and of note denies dysuria or urinary frequency.  She states that she still drives and then explained that her license has .      It sounds like the home situation may not be tenable anymore as the patient likely has undiagnosed dementia and difficulty caring for herself.  Does have a PCP but does not see her regularly.  On the day of appointments she gets worried and then does not go.  Been and her friends have been trying to get her to see the doctor unsuccessfully.      In the ED, vitals notable for some hypertension with blood pressure is 160/70s.  CBC and CMP were normal.  Urinalysis with 11 WBCs per high-power field and some leukocyte esterase.  She was diagnosed with a urinary tract infection and referred for admission.  She was started on ciprofloxacin    Overview/Hospital Course:  The patient presented to the ED after calling the police. She was noted to have confusion. Police department  called EMS. Workup consistent with UTI. Patient placed on empiric antibiotics. Psychiatry consulted as patient does not appear to have capacity. Urine culture with multiple organisms. Repeat UA. Participated with therapy. 24 hour supervision recommended. Patient does not have 24 hour supervision. She has friends who check on her but are unable to provide 24 hour supervision. Skilled placement being pursued. Patient accepted to facility, awaiting insurance authorization.    Alert to self, in bed. Psych robe initially recommends discussing discharge options with patient, ideally with identified support person present. Wonder if intermediate option such as home health would be more in line with patient's expressed preferences. After more discussion with staff, patient's friends and caregiver, psych does not feel that patient currently has the capacity to make decisions regarding discharge plan due to likely major neurocognitive disorder, which is unlikely to improve with treatment of UTI : Able to state she was in hospital, but unable to say which or where. Some confusion regarding current events.CM working on insurance authorization for SNF placement.     continues to work on insurance verification for placement. Was denied SNF placement by insurance. CM will move to potential nursing home placement.  Due to lack of PCP follow up patient does not have an official diagnosis of dementia although shows clear signs of dementia.  Workup to rule out other causes of altered mental status including TSH, RPR, tau protein, amyloid, RPR, folate level, and B12 has been ordered.  Neurology was consulted and discussion was held with the neurologist who stated that an inpatient diagnosis of dementia is not possible and therefore would need to follow up as an outpatient with Neurology.  Given her situation with lack of family, patient is not a safe discharge home.  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  She roams the halls and therefore a risk for elopement at a nursing home without a memory care unit.  She is pleasantly demented and is not aggressive or agitated.    01/07/2025  Awaiting SNF placement.    Interval History:  No acute issues reported overnight.    Review of Systems   Unable to perform ROS: Dementia     Objective:     Vital Signs (Most Recent):  Temp: 97.8 °F (36.6 °C) (01/07/25 0728)  Pulse: 65 (01/07/25 0728)  Resp: 16 (01/07/25 0728)  BP: (!) 119/56 (01/07/25 0728)  SpO2: 96 % (01/07/25 0728) Vital Signs (24h Range):  Temp:  [97.8 °F (36.6 °C)-98.5 °F (36.9 °C)] 97.8 °F (36.6 °C)  Pulse:  [65-73] 65  Resp:  [16-18] 16  SpO2:  [96 %-100 %] 96 %  BP: (109-119)/(55-64) 119/56     Weight: 43.3 kg (95 lb 7.4 oz)  Body mass index is 16.39 kg/m².  No intake or output data in the 24 hours ending 01/07/25 1048      Physical Exam  Vitals and nursing note reviewed.   Constitutional:       General: She is not in acute distress.     Appearance: Normal appearance. She is well-developed. She is not diaphoretic.   HENT:      Head: Normocephalic and atraumatic.      Nose: Nose normal.      Mouth/Throat:      Mouth: Mucous membranes are moist.   Eyes:      Extraocular Movements: Extraocular movements intact.      Conjunctiva/sclera: Conjunctivae normal.      Pupils: Pupils are equal, round, and reactive to light.   Cardiovascular:      Rate and Rhythm: Normal rate and regular rhythm.      Pulses: Normal pulses.      Heart sounds: Normal heart sounds. No murmur heard.     No friction rub. No gallop.   Pulmonary:      Effort: Pulmonary effort is normal. No respiratory distress.      Breath sounds: Normal breath sounds. No stridor. No wheezing or rales.   Abdominal:      General: Abdomen is flat. Bowel sounds are normal. There is no distension.      Palpations: Abdomen is soft. There is no mass.      Tenderness: " There is no abdominal tenderness. There is no guarding.   Musculoskeletal:         General: Normal range of motion.      Cervical back: Normal range of motion and neck supple.   Skin:     General: Skin is warm.      Capillary Refill: Capillary refill takes less than 2 seconds.      Findings: No erythema.   Neurological:      Mental Status: She is alert. Mental status is at baseline. She is disoriented.   Psychiatric:         Mood and Affect: Mood normal.         Behavior: Behavior normal.             Significant Labs: All pertinent labs within the past 24 hours have been reviewed.    Significant Imaging: I have reviewed all pertinent imaging results/findings within the past 24 hours.      Assessment and Plan     * UTI (urinary tract infection)  Complete course of antibiotics 12/28.  Resolved.      Moderate malnutrition  Nutrition consulted. Most recent weight and BMI monitored-     Measurements:  Wt Readings from Last 1 Encounters:   01/03/25 43.3 kg (95 lb 7.4 oz)   Body mass index is 16.39 kg/m².    Patient has been screened and assessed by RD.    Malnutrition Type:  Context: chronic illness  Level: severe    Malnutrition Characteristic Summary:  Weight Loss (Malnutrition): 20% in 1 year  Energy Intake (Malnutrition): less than or equal to 75% for greater than or equal to 1 month  Subcutaneous Fat (Malnutrition): severe depletion  Muscle Mass (Malnutrition): severe depletion    Interventions/Recommendations (treatment strategy):  1. Liberalized regular diet 2. Commercial beverage          Impaired cognition  -confusional state noted in last few medical encounters  -psych evaluated: Recommend discussing discharge options with patient, ideally with identified support person present. Wonder if intermediate option such as home health would be more in line with patient's expressed preferences. Do not feel that patient currently has the capacity to make decisions regarding discharge plan due to likely major  neurocognitive disorder, which is unlikely to improve with treatment of UTI   3. Recommend outpatient follow up with neurology for dementia evaluation. , lacks capacity  Sniff placement denied.  Case management working on nursing home placement but given her impaired cognition without an official diagnosis of dementia patient would only be able to be admitted to the nursing home portion without memory care.  Patient will need to see Neurology as an outpatient for an official diagnosis although patient is clinically demented based on last 13 days of admission to the hospital.  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    OP follow-up with neurology set up for dishcarge    Hypertension  Patient's blood pressure range in the last 24 hours was: BP  Min: 109/55  Max: 125/59.The patient's inpatient anti-hypertensive regimen is listed below:  Current Antihypertensives  bisoprolol-hydrochlorothiazide 2.5-6.25 mg per tablet 1 tablet, Daily, Oral    Will set up with PCP for medication management at discharge       VTE Risk Mitigation (From admission, onward)           Ordered     enoxaparin injection 30 mg  Daily         12/30/24 0808     IP VTE HIGH RISK PATIENT  Once         12/24/24 1720     Place sequential compression device  Until discontinued         12/24/24 1720                    Discharge Planning   MEJIA:      Code Status: Full Code   Medical Readiness for Discharge Date:   Discharge Plan A: Skilled Nursing Facility   Discharge Delays: None known at this time          Jam Roche MD  Department of Hospital Medicine   [facility] - Telemetry ([facility])

## 2025-01-07 NOTE — ASSESSMENT & PLAN NOTE
-confusional state noted in last few medical encounters  -psych evaluated: Recommend discussing discharge options with patient, ideally with identified support person present. Wonder if intermediate option such as home health would be more in line with patient's expressed preferences. Do not feel that patient currently has the capacity to make decisions regarding discharge plan due to likely major neurocognitive disorder, which is unlikely to improve with treatment of UTI   3. Recommend outpatient follow up with neurology for dementia evaluation. , lacks capacity  Sniff placement denied.  Case management working on nursing home placement but given her impaired cognition without an official diagnosis of dementia patient would only be able to be admitted to the nursing home portion without memory care.  Patient will need to see Neurology as an outpatient for an official diagnosis although patient is clinically demented based on last 13 days of admission to the hospital.  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    OP follow-up with neurology set up for [facility]

## 2025-01-07 NOTE — PLAN OF CARE
Pt urinated on floor, trazodone given during shift for rest.  Problem: Adult Inpatient Plan of Care  Goal: Plan of Care Review  Outcome: Progressing  Goal: Patient-Specific Goal (Individualized)  Outcome: Progressing  Goal: Absence of Hospital-Acquired Illness or Injury  Outcome: Progressing  Goal: Optimal Comfort and Wellbeing  Outcome: Progressing  Goal: Readiness for Transition of Care  Outcome: Progressing     Problem: Fall Injury Risk  Goal: Absence of Fall and Fall-Related Injury  Outcome: Progressing     Problem: UTI (Urinary Tract Infection)  Goal: Improved Infection Symptoms  Outcome: Progressing     Problem: Confusion Acute  Goal: Optimal Cognitive Function  Outcome: Progressing     Problem: Skin Injury Risk Increased  Goal: Skin Health and Integrity  Outcome: Progressing     Problem: Wound  Goal: Optimal Coping  Outcome: Progressing  Goal: Optimal Functional Ability  Outcome: Progressing  Goal: Absence of Infection Signs and Symptoms  Outcome: Progressing  Goal: Improved Oral Intake  Outcome: Progressing  Goal: Optimal Pain Control and Function  Outcome: Progressing  Goal: Skin Health and Integrity  Outcome: Progressing  Goal: Optimal Wound Healing  Outcome: Progressing     Problem: Fatigue  Goal: Improved Activity Tolerance  Outcome: Progressing

## 2025-01-07 NOTE — PLAN OF CARE
01/07/25 1500   Rounds   Attendance Provider;;Charge nurse;Physical therapist   Discharge Plan A New Nursing Home placement - long-term care facility   Why the patient remains in the hospital Placement issues   Transition of Care Barriers None       Patient's bank statements and long term medicaid application was reviewed by [facility] for Nursing Home Placement. Per Sunday, their Business Officer Manager is in need of additional information. Sunday contacted Dominick Loco, regarding documentation, yesterday.  Per Sunday, no updates as of 11:30 am this morning.     [facility] will continue to follow and assist as needed.

## 2025-01-08 PROBLEM — N39.0 UTI (URINARY TRACT INFECTION): Status: RESOLVED | Noted: 2024-12-24 | Resolved: 2025-01-08

## 2025-01-08 PROCEDURE — 25000003 PHARM REV CODE 250: Performed by: STUDENT IN AN ORGANIZED HEALTH CARE EDUCATION/TRAINING PROGRAM

## 2025-01-08 PROCEDURE — 25000003 PHARM REV CODE 250: Performed by: NURSE PRACTITIONER

## 2025-01-08 PROCEDURE — G0378 HOSPITAL OBSERVATION PER HR: HCPCS

## 2025-01-08 PROCEDURE — 25000242 PHARM REV CODE 250 ALT 637 W/ HCPCS: Performed by: INTERNAL MEDICINE

## 2025-01-08 PROCEDURE — 96372 THER/PROPH/DIAG INJ SC/IM: CPT | Performed by: INTERNAL MEDICINE

## 2025-01-08 PROCEDURE — 63600175 PHARM REV CODE 636 W HCPCS: Performed by: INTERNAL MEDICINE

## 2025-01-08 RX ADMIN — ENOXAPARIN SODIUM 30 MG: 30 INJECTION SUBCUTANEOUS at 04:01

## 2025-01-08 RX ADMIN — FLUTICASONE PROPIONATE 100 MCG: 50 SPRAY, METERED NASAL at 10:01

## 2025-01-08 RX ADMIN — ASPIRIN 81 MG: 81 TABLET, COATED ORAL at 10:01

## 2025-01-08 RX ADMIN — TRAZODONE HYDROCHLORIDE 50 MG: 50 TABLET ORAL at 07:01

## 2025-01-08 NOTE — PLAN OF CARE
A241/A241 LUZ ELENA  Lucy Staples is a 79 y.o.female admitted on 12/24/2024 for UTI (urinary tract infection)   Code Status: Full Code MRN: [id number]   Review of patient's allergies indicates:   Allergen Reactions    Pcn [penicillins] Hives     Past Medical History:   Diagnosis Date    History of oral surgery     Hyperlipidemia     Hypertension     IBS (irritable bowel syndrome)     Rectocele     Recurrent UTI       PRN meds    dextrose 50%, 12.5 g, PRN  dextrose 50%, 25 g, PRN  docusate sodium, 100 mg, Daily PRN  glucagon (human recombinant), 1 mg, PRN  glucose, 16 g, PRN  glucose, 24 g, PRN  melatonin, 6 mg, Nightly PRN  naloxone, 0.02 mg, PRN  sodium chloride 0.9%, 10 mL, Q12H PRN  traZODone, 50 mg, Nightly PRN      Chart check completed. Will continue plan of care.      Orientation: disoriented to, place, situation, time  [place] Coma Scale Score: 14     Lead Monitored: other (see comments) (No tele monitor) Rhythm: normal sinus rhythm    Cardiac/Telemetry Box Number: [id number]  VTE Core Measure: Pharmacological prophylaxis initiated/maintained Last Bowel Movement: 01/06/25  Diet Adult Regular  Voiding Characteristics: frequency  Kraig Score: 21  Fall Risk Score: 11  Accucheck []   Freq?      Lines/Drains/Airways       None

## 2025-01-08 NOTE — PROGRESS NOTES
[facility] Medicine  Progress Note    Patient Name: Lucy Staples  MRN: [id number]  Patient Class: OP- Observation   Admission Date: 2024  Length of Stay: 0 days  Attending Physician: Deepika Fisher MD  Primary Care Provider: Lamin Webber MD        Subjective     Principal Problem:UTI (urinary tract infection)        HPI:  This is an 79-year-old woman who lives alone with hypertension, hyperlipidemia who was confused and called the police department, ultimately ended up in the hospital and was found to have evidence of a UTI     Patient is accompanied by her friend from [facility], Dominick Loco ([phone]) who explains patient has a good support network with friends and neighbors who help to take care of her at home by checking on her intermittently.  It is unclear what happened today, the patient does not remember, but per the ED notes the patient could not locate her keys and called the police department who then called for an ambulance.  The patient is asymptomatic at this time and of note denies dysuria or urinary frequency.  She states that she still drives and then explained that her license has .      It sounds like the home situation may not be tenable anymore as the patient likely has undiagnosed dementia and difficulty caring for herself.  Does have a PCP but does not see her regularly.  On the day of appointments she gets worried and then does not go.  Been and her friends have been trying to get her to see the doctor unsuccessfully.      In the ED, vitals notable for some hypertension with blood pressure is 160/70s.  CBC and CMP were normal.  Urinalysis with 11 WBCs per high-power field and some leukocyte esterase.  She was diagnosed with a urinary tract infection and referred for admission.  She was started on ciprofloxacin    Overview/Hospital Course:  The patient presented to the ED after calling the police. She was noted to have confusion. Police  department called EMS. Workup consistent with UTI. Patient placed on empiric antibiotics. Psychiatry consulted as patient does not appear to have capacity. Urine culture with multiple organisms. Repeat UA. Participated with therapy. 24 hour supervision recommended. Patient does not have 24 hour supervision. She has friends who check on her but are unable to provide 24 hour supervision. Skilled placement being pursued. Patient accepted to facility, awaiting insurance authorization.    Alert to self, in bed. Psych robe initially recommends discussing discharge options with patient, ideally with identified support person present. Wonder if intermediate option such as home health would be more in line with patient's expressed preferences. After more discussion with staff, patient's friends and caregiver, psych does not feel that patient currently has the capacity to make decisions regarding discharge plan due to likely major neurocognitive disorder, which is unlikely to improve with treatment of UTI : Able to state she was in hospital, but unable to say which or where. Some confusion regarding current events.CM working on insurance authorization for SNF placement.     continues to work on insurance verification for placement. Was denied SNF placement by insurance. CM will move to potential nursing home placement.  Due to lack of PCP follow up patient does not have an official diagnosis of dementia although shows clear signs of dementia.  Workup to rule out other causes of altered mental status including TSH, RPR, tau protein, amyloid, RPR, folate level, and B12 has been ordered.  Neurology was consulted and discussion was held with the neurologist who stated that an inpatient diagnosis of dementia is not possible and therefore would need to follow up as an outpatient with Neurology.  Given her situation with lack of family, patient is not a safe discharge home.  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  She roams the halls and therefore a risk for elopement at a nursing home without a memory care unit.  She is pleasantly demented and is not aggressive or agitated.    01/07/2025  Awaiting SNF placement.    1/8- advanced dementia- social disposition, awaiting NH placement, doing well, eating drinking well, walking around well.     Interval History: advanced dementia- social disposition, awaiting NH placement, doing well, eating drinking well, walking around well.     Review of Systems   Unable to perform ROS: Dementia     Objective:     Vital Signs (Most Recent):  Temp: 98.6 °F (37 °C) (01/08/25 1649)  Pulse: 76 (01/08/25 1649)  Resp: 18 (01/08/25 1649)  BP: (!) 106/51 (01/08/25 1649)  SpO2: 97 % (01/08/25 1649) Vital Signs (24h Range):  Temp:  [97.5 °F (36.4 °C)-98.6 °F (37 °C)] 98.6 °F (37 °C)  Pulse:  [61-93] 76  Resp:  [12-18] 18  SpO2:  [96 %-100 %] 97 %  BP: ()/(51-60) 106/51     Weight: 43.3 kg (95 lb 7.4 oz)  Body mass index is 16.39 kg/m².    Intake/Output Summary (Last 24 hours) at 1/8/2025 1737  Last data filed at 1/8/2025 1712  Gross per 24 hour   Intake 360 ml   Output --   Net 360 ml         Physical Exam  Vitals and nursing note reviewed.   Constitutional:       General: She is not in acute distress.     Appearance: Normal appearance. She is well-developed. She is not diaphoretic.   HENT:      Head: Normocephalic and atraumatic.      Nose: Nose normal.      Mouth/Throat:      Mouth: Mucous membranes are moist.   Eyes:      Extraocular Movements: Extraocular movements intact.      Conjunctiva/sclera: Conjunctivae normal.      Pupils: Pupils are equal, round, and reactive to light.   Cardiovascular:      Rate and Rhythm: Normal rate and regular rhythm.      Pulses: Normal pulses.      Heart sounds: Normal heart sounds. No murmur heard.     No friction rub. No gallop.   Pulmonary: "      Effort: Pulmonary effort is normal. No respiratory distress.      Breath sounds: Normal breath sounds. No stridor. No wheezing or rales.   Abdominal:      General: Abdomen is flat. Bowel sounds are normal. There is no distension.      Palpations: Abdomen is soft. There is no mass.      Tenderness: There is no abdominal tenderness. There is no guarding.   Musculoskeletal:         General: Normal range of motion.      Cervical back: Normal range of motion and neck supple.   Skin:     General: Skin is warm.      Capillary Refill: Capillary refill takes less than 2 seconds.      Findings: No erythema.   Neurological:      General: No focal deficit present.      Mental Status: She is alert. Mental status is at baseline. She is disoriented.   Psychiatric:         Mood and Affect: Mood normal.         Behavior: Behavior normal.           Significant Labs: All pertinent labs within the past 24 hours have been reviewed.    Significant Imaging: I have reviewed all pertinent imaging results/findings within the past 24 hours.    Assessment and Plan     Moderate malnutrition  Nutrition consulted. Most recent weight and BMI monitored-     Measurements:  Wt Readings from Last 1 Encounters:   01/03/25 43.3 kg (95 lb 7.4 oz)   Body mass index is 16.39 kg/m².    Patient has been screened and assessed by RD.    Malnutrition Type:  Context: chronic illness  Level: severe    Malnutrition Characteristic Summary:  Weight Loss (Malnutrition): 20% in 1 year  Energy Intake (Malnutrition): less than or equal to 75% for greater than or equal to 1 month  Subcutaneous Fat (Malnutrition): severe depletion  Muscle Mass (Malnutrition): severe depletion    Interventions/Recommendations (treatment strategy):  1. Liberalized regular diet 2. Commercial beverage          Impaired cognition  -confusional state noted in last few medical encounters  -psych evaluated: Recommend discussing discharge options with patient, ideally with identified support  person present. Wonder if intermediate option such as home health would be more in line with patient's expressed preferences. Do not feel that patient currently has the capacity to make decisions regarding discharge plan due to likely major neurocognitive disorder, which is unlikely to improve with treatment of UTI   3. Recommend outpatient follow up with neurology for dementia evaluation. , lacks capacity  Sniff placement denied.  Case management working on nursing home placement but given her impaired cognition without an official diagnosis of dementia patient would only be able to be admitted to the nursing home portion without memory care.  Patient will need to see Neurology as an outpatient for an official diagnosis although patient is clinically demented based on last 13 days of admission to the hospital.  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    OP follow-up with neurology set up for discharge    Await NH placement    Hypertension  Patient's blood pressure range in the last 24 hours was: BP  Min: 92/60  Max: 120/60.The patient's inpatient anti-hypertensive regimen is listed below:  Current Antihypertensives  bisoprolol-hydrochlorothiazide 2.5-6.25 mg per tablet 1 tablet, Daily, Oral    Will set up with PCP for medication management at discharge     BP stable      VTE Risk Mitigation (From admission, onward)           Ordered     enoxaparin injection 30 mg  Daily         12/30/24 0808     IP VTE HIGH RISK PATIENT  Once         12/24/24 1720     Place sequential compression device  Until discontinued         12/24/24 1720                    Discharge Planning   MEJIA:      Code Status: Full Code   Medical Readiness for Discharge Date: 12/27/2024  Discharge Plan A: New Nursing Home placement - [facility] care facility   Discharge  Delays: None known at this time    Please place Justification for DME      Deepika Fisher MD  Department of Hospital Medicine   [facility] - Telemetry ([facility])

## 2025-01-08 NOTE — SUBJECTIVE & OBJECTIVE
Interval History: advanced dementia- social disposition, awaiting NH placement, doing well, eating drinking well, walking around well.     Review of Systems   Unable to perform ROS: Dementia     Objective:     Vital Signs (Most Recent):  Temp: 98.6 °F (37 °C) (01/08/25 1649)  Pulse: 76 (01/08/25 1649)  Resp: 18 (01/08/25 1649)  BP: (!) 106/51 (01/08/25 1649)  SpO2: 97 % (01/08/25 1649) Vital Signs (24h Range):  Temp:  [97.5 °F (36.4 °C)-98.6 °F (37 °C)] 98.6 °F (37 °C)  Pulse:  [61-93] 76  Resp:  [12-18] 18  SpO2:  [96 %-100 %] 97 %  BP: ()/(51-60) 106/51     Weight: 43.3 kg (95 lb 7.4 oz)  Body mass index is 16.39 kg/m².    Intake/Output Summary (Last 24 hours) at 1/8/2025 1737  Last data filed at 1/8/2025 1712  Gross per 24 hour   Intake 360 ml   Output --   Net 360 ml         Physical Exam  Vitals and nursing note reviewed.   Constitutional:       General: She is not in acute distress.     Appearance: Normal appearance. She is well-developed. She is not diaphoretic.   HENT:      Head: Normocephalic and atraumatic.      Nose: Nose normal.      Mouth/Throat:      Mouth: Mucous membranes are moist.   Eyes:      Extraocular Movements: Extraocular movements intact.      Conjunctiva/sclera: Conjunctivae normal.      Pupils: Pupils are equal, round, and reactive to light.   Cardiovascular:      Rate and Rhythm: Normal rate and regular rhythm.      Pulses: Normal pulses.      Heart sounds: Normal heart sounds. No murmur heard.     No friction rub. No gallop.   Pulmonary:      Effort: Pulmonary effort is normal. No respiratory distress.      Breath sounds: Normal breath sounds. No stridor. No wheezing or rales.   Abdominal:      General: Abdomen is flat. Bowel sounds are normal. There is no distension.      Palpations: Abdomen is soft. There is no mass.      Tenderness: There is no abdominal tenderness. There is no guarding.   Musculoskeletal:         General: Normal range of motion.      Cervical back: Normal range  of motion and neck supple.   Skin:     General: Skin is warm.      Capillary Refill: Capillary refill takes less than 2 seconds.      Findings: No erythema.   Neurological:      General: No focal deficit present.      Mental Status: She is alert. Mental status is at baseline. She is disoriented.   Psychiatric:         Mood and Affect: Mood normal.         Behavior: Behavior normal.           Significant Labs: All pertinent labs within the past 24 hours have been reviewed.    Significant Imaging: I have reviewed all pertinent imaging results/findings within the past 24 hours.

## 2025-01-08 NOTE — ASSESSMENT & PLAN NOTE
Patient's blood pressure range in the last 24 hours was: BP  Min: 92/60  Max: 120/60.The patient's inpatient anti-hypertensive regimen is listed below:  Current Antihypertensives  bisoprolol-hydrochlorothiazide 2.5-6.25 mg per tablet 1 tablet, Daily, Oral    Will set up with PCP for medication management at discharge     BP stable

## 2025-01-08 NOTE — ASSESSMENT & PLAN NOTE
-confusional state noted in last few medical encounters  -psych evaluated: Recommend discussing discharge options with patient, ideally with identified support person present. Wonder if intermediate option such as home health would be more in line with patient's expressed preferences. Do not feel that patient currently has the capacity to make decisions regarding discharge plan due to likely major neurocognitive disorder, which is unlikely to improve with treatment of UTI   3. Recommend outpatient follow up with neurology for dementia evaluation. , lacks capacity  Sniff placement denied.  Case management working on nursing home placement but given her impaired cognition without an official diagnosis of dementia patient would only be able to be admitted to the nursing home portion without memory care.  Patient will need to see Neurology as an outpatient for an official diagnosis although patient is clinically demented based on last 13 days of admission to the hospital.  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    OP follow-up with neurology set up for discharge    Await NH placement

## 2025-01-09 LAB — LC PHOSPHO-TAU (181P): 1.7 PG/ML (ref 0–0.97)

## 2025-01-09 PROCEDURE — 25000003 PHARM REV CODE 250: Performed by: HOSPITALIST

## 2025-01-09 PROCEDURE — 25000003 PHARM REV CODE 250: Performed by: STUDENT IN AN ORGANIZED HEALTH CARE EDUCATION/TRAINING PROGRAM

## 2025-01-09 PROCEDURE — 63600175 PHARM REV CODE 636 W HCPCS: Performed by: INTERNAL MEDICINE

## 2025-01-09 PROCEDURE — 97530 THERAPEUTIC ACTIVITIES: CPT

## 2025-01-09 PROCEDURE — 96372 THER/PROPH/DIAG INJ SC/IM: CPT | Performed by: INTERNAL MEDICINE

## 2025-01-09 PROCEDURE — G0378 HOSPITAL OBSERVATION PER HR: HCPCS

## 2025-01-09 PROCEDURE — 25000003 PHARM REV CODE 250: Performed by: EMERGENCY MEDICINE

## 2025-01-09 RX ADMIN — TRAZODONE HYDROCHLORIDE 50 MG: 50 TABLET ORAL at 10:01

## 2025-01-09 RX ADMIN — MELATONIN TAB 3 MG 6 MG: 3 TAB at 10:01

## 2025-01-09 RX ADMIN — BISOPROLOL FUMARATE AND HYDROCHLOROTHIAZIDE 1 TABLET: 6.25; 2.5 TABLET ORAL at 10:01

## 2025-01-09 RX ADMIN — ENOXAPARIN SODIUM 30 MG: 30 INJECTION SUBCUTANEOUS at 05:01

## 2025-01-09 RX ADMIN — ASPIRIN 81 MG: 81 TABLET, COATED ORAL at 10:01

## 2025-01-09 RX ADMIN — FLUTICASONE PROPIONATE 100 MCG: 50 SPRAY, METERED NASAL at 10:01

## 2025-01-09 NOTE — ASSESSMENT & PLAN NOTE
Patient's blood pressure range in the last 24 hours was: BP  Min: 101/55  Max: 129/60.The patient's inpatient anti-hypertensive regimen is listed below:  Current Antihypertensives  bisoprolol-hydrochlorothiazide 2.5-6.25 mg per tablet 1 tablet, Daily, Oral    Will set up with PCP for medication management at discharge     BP stable

## 2025-01-09 NOTE — PROGRESS NOTES
[facility] - Telemetry ([facility])  Adult Nutrition  Progress Note    SUMMARY       Recommendations    Recommendation/Intervention:   1. Recommend pt continues on a Regular diet   2. Recommend pt continues Boost plus TID as warranted   3. Encourage PO intake and recommend feeding assistance/ tray set up   4. Weigh twice weekly     Goals:  1. Patient will tolerate and consume > 75% of EEN and EPN prior to RD follow up (Improving)  Nutrition Goal Status: progressing towards goal  Communication of RD Recs: other (comment) (POC, sticky note)    Assessment and Plan    Endocrine  Moderate malnutrition  Malnutrition Type:  Context: chronic illness  Level: severe    Related to (etiology):   Psychological causes     Signs and Symptoms (as evidenced by):   BMI < 22 (older adult)  Underweight with loss of fat and muscle  Unable or unwilling to eat sufficient energy/protein to maintain a healthy weight    Malnutrition Characteristic Summary:  Weight Loss (Malnutrition): 20% in 1 year  Energy Intake (Malnutrition): less than or equal to 75% for greater than or equal to 1 month  Subcutaneous Fat (Malnutrition): severe depletion  Muscle Mass (Malnutrition): severe depletion    Interventions(treatment strategy):  1. General healthful diet  2. Commercial beverage medical food supplement therapy  3. Feeding assistance management  4. Collaboration by nutrition professional with other providers    Nutrition Diagnosis Status:   Continues         Malnutrition Assessment (1/3/25):  Malnutrition Context: chronic illness  Malnutrition Level: severe  Skin (Micronutrient): bruised, red (Kraig score = 21 (no risk)  Teeth (Micronutrient): dentures   Micronutrient Evaluation Summary: suspected deficiency   Energy Intake (Malnutrition): less than or equal to 75% for greater than or equal to 1 month  Subcutaneous Fat (Malnutrition): severe depletion  Muscle Mass (Malnutrition): severe depletion   Orbital Region (Subcutaneous Fat Loss): severe  depletion  Upper Arm Region (Subcutaneous Fat Loss): severe depletion   [place] Region (Muscle Loss): severe depletion  Clavicle Bone Region (Muscle Loss): severe depletion  Clavicle and Acromion Bone Region (Muscle Loss): severe depletion  Dorsal Hand (Muscle Loss): severe depletion  Patellar Region (Muscle Loss): moderate depletion  Anterior Thigh Region (Muscle Loss): moderate depletion  Posterior Calf Region (Muscle Loss): moderate depletion                 Reason for Assessment    Reason For Assessment: RD follow-up  Diagnosis: other (see comments) (UTI (urinary tract infection))  General Information Comments:   12/26/24:Patient is on a regular oral diet with decreased appetite and noted decreased po intake of 25% at meal times.  Patient admitted with UTI and PMH of dementia.  Labs reviewed.  NKFA.  LBM: 12/23/2024.  Patient with significant weight loss within the past year.  Patient is positive for malnutrition.  RD to recommend commercial beverages: boost and to consider addtion of appetite stimulant.  RD to continue to monitor po intake and tolerance.    12/31/24: Dietitian working remotely. Spoke with pt over phone. Pt reports good appetite/intake. Eating % of most meals per flowsheet documentation. No c/o intolerance issues. Last documented BM 12/29/24. Current weight 22 lb below admit weight of 110 lb. Pt unable to recall her UBW/recent weight PTA (of note, pt disoriented x 3 per most recent flowsheet documentation). Has Boost Plus ordered but pt states she has not been receiving supplements with her meal trays - states she has never tried Boost before and would only like one/day for now, would like to try chocolate flavor.     Follow up:   1/3/25: RD follow up. Pt is currently on a Regular diet + Boost plus once a day. EMR noted continued confusion/AMS, UTI resolved. Visited pt at bedside, pt sitting up in chair, just finished eating lunch, visually confirmed 50% consumed, AMS noted. Pt reported  "that she had a good appetite and 2 meals/day PTA, confirmed also currently has a good appetite, stated she knows that she does not eat enough. Discussed Boost plus, pt stated that she "loves" them, requested to have a snacks, encouraged PO intake and ONS as snacks, pt expressed understanding. Provided pt with a menu to help encourage pt preferred food choices as well. Pt expressed desire to consume adequate nutrition, no food avoidance present. Pt denied N/V/D, abd pain or difficulty chewing/swallowing, stated she is unsure of her UBW. NFPE performed, moderate/severe malnutrition noted. Reviewed chart: LBM 12/29 (x 5 days no BM). Labs, meds, weights reviewed. Weight charted 12/31/24 88 lbs, 1/2/25 96 lbs (BMI 16.54, Protein-energy malnutrition grade II), +8 lb wt gain x 2 days, re weigh for accuracy warranted, note last previous weight charted from 2020, no current previous weights charted. RD will continue to follow and monitor pt's nutritional status during admit.     1/9/25: Dietitian working remotely, spoke with pt via phone. Pt reports continued good appetite/intake. Currently on regular diet and eating % of meals since last RD assessment per flowsheet documentation. No c/o intolerance issues. Pt states she has been drinking the chocolate Boost Plus being sent with meal trays, states they "give me energy." Last documented BM 1/6, not currently on bowel regimen. No new weight since last RD assessment.     Nutrition Discharge Planning: General Healthful Diet + Feeding assistance/encouragement  + Boost plus as warranted     Nutrition Risk Screen       Nutrition Related Social Determinants of Health: SDOH: Adequate food in home environment and None Identified    Nutrition/Diet History    Spiritual, Cultural Beliefs, Yazdanism Practices, Values that Affect Care: no  Food Allergies: NKFA  Factors Affecting Nutritional Intake: None identified at this time    Anthropometrics    Temp: 98.2 °F (36.8 °C)  Height " "Method: Stated  Height: 5' 4" (162.6 cm)  Height (inches): 64 in  Weight Method: Bed Scale  Weight: 43.3 kg (95 lb 7.4 oz)  Weight (lb): 95.46 lb  Ideal Body Weight (IBW), Female: 120 lb  % Ideal Body Weight, Female (lb): 91.68 %  BMI (Calculated): 16.4  BMI Grade: 18.5-24.9 - normal  Weight Loss: unintentional  Usual Body Weight (UBW), k kg (within 1 year)  % Usual Body Weight: 79.37  % Weight Change From Usual Weight: -20.79 %     Wt Readings from Last 15 Encounters:   25 43.3 kg (95 lb 7.4 oz)   20 69.1 kg (152 lb 5.4 oz)   10/18/19 68.1 kg (150 lb 2.1 oz)   19 53.7 kg (118 lb 6.2 oz)   19 68.6 kg (151 lb 3.8 oz)   19 67.6 kg (149 lb 0.5 oz)   19 65.9 kg (145 lb 4.5 oz)   19 64.8 kg (142 lb 13.7 oz)   19 63.5 kg (139 lb 15.9 oz)   19 61.6 kg (135 lb 12.9 oz)   19 61.5 kg (135 lb 9.3 oz)   18 64.8 kg (142 lb 13.7 oz)   17 64.5 kg (142 lb 3.2 oz)   17 64.3 kg (141 lb 12.1 oz)   16 65.9 kg (145 lb 4.5 oz)     Lab/Procedures/Meds    Pertinent Labs Reviewed: reviewed  Pertinent Medications Reviewed: reviewed    BMP  Lab Results   Component Value Date     2025    K 4.1 2025     2025    CO2 26 2025    BUN 20 2025    CREATININE 0.8 2025    CALCIUM 9.4 2025    ANIONGAP 10 2025    EGFRNORACEVR >60 2025     Lab Results   Component Value Date    CALCIUM 9.4 2025     Lab Results   Component Value Date    ALBUMIN 4.4 2024     Lab Results   Component Value Date    ALT 22 2024    AST 30 2024    ALKPHOS 56 2024    BILITOT 0.8 2024     No results for input(s): "POCTGLUCOSE" in the last 24 hours.    No results found for: "LABA1C", "HGBA1C"    Lab Results   Component Value Date    WBC 4.68 2025    HGB 12.0 2025    HCT 37.1 2025    MCV 97 2025     2025       Scheduled Meds:   aspirin  81 mg Oral Daily    " bisoprolol-hydrochlorothiazide 2.5-6.25 mg  1 tablet Oral Daily    enoxparin  30 mg Subcutaneous Daily    fluticasone propionate  2 spray Each Nostril Daily     Continuous Infusions:  PRN Meds:.  Current Facility-Administered Medications:     dextrose 50%, 12.5 g, Intravenous, PRN    dextrose 50%, 25 g, Intravenous, PRN    docusate sodium, 100 mg, Oral, Daily PRN    glucagon (human recombinant), 1 mg, Intramuscular, PRN    glucose, 16 g, Oral, PRN    glucose, 24 g, Oral, PRN    melatonin, 6 mg, Oral, Nightly PRN    naloxone, 0.02 mg, Intravenous, PRN    sodium chloride 0.9%, 10 mL, Intravenous, Q12H PRN    traZODone, 50 mg, Oral, Nightly PRN      Physical Findings/Assessment         Estimated/Assessed Needs    Weight Used For Calorie Calculations: 43.7 kg (96 lb 5.5 oz)  Energy Calorie Requirements (kcal): 8657-4590 kcals (30-35 kcals/kg ABW (Underweight)  Energy Need Method: Kcal/kg  Protein Requirements: 52-66 g (1.2-1.5 g/kg ABW (Underweight)  Weight Used For Protein Calculations: 43.7 kg (96 lb 5.5 oz)  Fluid Requirements (mL): 8639-7876 mL (1 mL/kcal)  Estimated Fluid Requirement Method: RDA Method  RDA Method (mL): 1311  CHO Requirement: 163-191 g (9412-5561 kcals/8)      Nutrition Prescription Ordered    Current Diet Order: Regular diet  Oral Nutrition Supplement: Boost plus TID    Evaluation of Received Nutrient/Fluid Intake  I/O: (Net since admit):  12/31/24: +4480.7 mL  1/3/25: +4660.7 mL  1/9/25: +3908 mL    Energy Calories Required: meeting needs  Protein Required: not meeting needs  Fluid Required: not meeting needs  Total Fluid Intake (mL): 360  Tolerance: tolerating  % Intake of Estimated Energy Needs: 75 - 100 %  % Meal Intake: 75 - 100 %    Nutrition Risk    Level of Risk/Frequency of Follow-up: moderate (F/u x 2 weekly)     Monitor and Evaluation    Food and Nutrient Intake: energy intake, food and beverage intake  Food and Nutrient Adminstration: diet order  Knowledge/Beliefs/Attitudes: food and  nutrition knowledge/skill, beliefs and attitudes  Anthropometric Measurements: height/length, weight, weight change, body mass index  Biochemical Data, Medical Tests and Procedures: gastrointestinal profile  Nutrition-Focused Physical Findings: overall appearance     Nutrition Follow-Up    RD Follow-up?: Yes  Tasha Alcaraz RD, CLINTONN, CNSC

## 2025-01-09 NOTE — PROGRESS NOTES
[facility] Medicine  Progress Note    Patient Name: Lucy Staples  MRN: [id number]  Patient Class: OP- Observation   Admission Date: 2024  Length of Stay: 0 days  Attending Physician: Deepika Fisher MD  Primary Care Provider: Lamin Webber MD        Subjective     Principal Problem:UTI (urinary tract infection)        HPI:  This is an 79-year-old woman who lives alone with hypertension, hyperlipidemia who was confused and called the police department, ultimately ended up in the hospital and was found to have evidence of a UTI     Patient is accompanied by her friend from [facility], Dominick Loco ([phone]) who explains patient has a good support network with friends and neighbors who help to take care of her at home by checking on her intermittently.  It is unclear what happened today, the patient does not remember, but per the ED notes the patient could not locate her keys and called the police department who then called for an ambulance.  The patient is asymptomatic at this time and of note denies dysuria or urinary frequency.  She states that she still drives and then explained that her license has .      It sounds like the home situation may not be tenable anymore as the patient likely has undiagnosed dementia and difficulty caring for herself.  Does have a PCP but does not see her regularly.  On the day of appointments she gets worried and then does not go.  Been and her friends have been trying to get her to see the doctor unsuccessfully.      In the ED, vitals notable for some hypertension with blood pressure is 160/70s.  CBC and CMP were normal.  Urinalysis with 11 WBCs per high-power field and some leukocyte esterase.  She was diagnosed with a urinary tract infection and referred for admission.  She was started on ciprofloxacin    Overview/Hospital Course:  The patient presented to the ED after calling the police. She was noted to have confusion. Police  department called EMS. Workup consistent with UTI. Patient placed on empiric antibiotics. Psychiatry consulted as patient does not appear to have capacity. Urine culture with multiple organisms. Repeat UA. Participated with therapy. 24 hour supervision recommended. Patient does not have 24 hour supervision. She has friends who check on her but are unable to provide 24 hour supervision. Skilled placement being pursued. Patient accepted to facility, awaiting insurance authorization.    Alert to self, in bed. Psych robe initially recommends discussing discharge options with patient, ideally with identified support person present. Wonder if intermediate option such as home health would be more in line with patient's expressed preferences. After more discussion with staff, patient's friends and caregiver, psych does not feel that patient currently has the capacity to make decisions regarding discharge plan due to likely major neurocognitive disorder, which is unlikely to improve with treatment of UTI : Able to state she was in hospital, but unable to say which or where. Some confusion regarding current events.CM working on insurance authorization for SNF placement.     continues to work on insurance verification for placement. Was denied SNF placement by insurance. CM will move to potential nursing home placement.  Due to lack of PCP follow up patient does not have an official diagnosis of dementia although shows clear signs of dementia.  Workup to rule out other causes of altered mental status including TSH, RPR, tau protein, amyloid, RPR, folate level, and B12 has been ordered.  Neurology was consulted and discussion was held with the neurologist who stated that an inpatient diagnosis of dementia is not possible and therefore would need to follow up as an outpatient with Neurology.  Given her situation with lack of family, patient is not a safe discharge home.  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  She roams the halls and therefore a risk for elopement at a nursing home without a memory care unit.  She is pleasantly demented and is not aggressive or agitated.    01/07/2025  Awaiting SNF placement.    1/8- advanced dementia- social disposition, awaiting NH placement, doing well, eating drinking well, walking around well.     1/9- looks well, walking and talking, eating well, pleasantly confused, disoriented. Await placement, SW arranging.    Interval History: looks well, walking and talking, eating well, pleasantly confused, disoriented. Await placement, SW arranging.    Review of Systems   Unable to perform ROS: Dementia   Constitutional:  Positive for activity change.     Objective:     Vital Signs (Most Recent):  Temp: 98.5 °F (36.9 °C) (01/09/25 1545)  Pulse: 75 (01/09/25 1545)  Resp: 16 (01/09/25 1545)  BP: 127/68 (01/09/25 1545)  SpO2: 97 % (01/09/25 1545) Vital Signs (24h Range):  Temp:  [97.7 °F (36.5 °C)-98.7 °F (37.1 °C)] 98.5 °F (36.9 °C)  Pulse:  [70-84] 75  Resp:  [16-18] 16  SpO2:  [97 %-98 %] 97 %  BP: (101-129)/(55-77) 127/68     Weight: 43.3 kg (95 lb 7.4 oz)  Body mass index is 16.39 kg/m².  No intake or output data in the 24 hours ending 01/09/25 1730      Physical Exam  Vitals and nursing note reviewed.   Constitutional:       General: She is not in acute distress.     Appearance: Normal appearance. She is well-developed. She is not diaphoretic.   HENT:      Head: Normocephalic and atraumatic.      Nose: Nose normal.      Mouth/Throat:      Mouth: Mucous membranes are moist.   Eyes:      Extraocular Movements: Extraocular movements intact.      Conjunctiva/sclera: Conjunctivae normal.      Pupils: Pupils are equal, round, and reactive to light.   Cardiovascular:      Rate and Rhythm: Normal rate and regular rhythm.      Pulses: Normal pulses.      Heart sounds: Normal heart " sounds. No murmur heard.     No friction rub. No gallop.   Pulmonary:      Effort: Pulmonary effort is normal. No respiratory distress.      Breath sounds: Normal breath sounds. No stridor. No wheezing or rales.   Abdominal:      General: Abdomen is flat. Bowel sounds are normal. There is no distension.      Palpations: Abdomen is soft. There is no mass.      Tenderness: There is no abdominal tenderness. There is no guarding.   Musculoskeletal:         General: Normal range of motion.      Cervical back: Normal range of motion and neck supple.   Skin:     General: Skin is warm.      Capillary Refill: Capillary refill takes less than 2 seconds.      Findings: No erythema.   Neurological:      General: No focal deficit present.      Mental Status: She is alert. Mental status is at baseline. She is disoriented.   Psychiatric:         Mood and Affect: Mood normal.         Behavior: Behavior normal.             Significant Labs: All pertinent labs within the past 24 hours have been reviewed.    Significant Imaging: I have reviewed all pertinent imaging results/findings within the past 24 hours.    Assessment and Plan     Moderate malnutrition  Nutrition consulted. Most recent weight and BMI monitored-     Measurements:  Wt Readings from Last 1 Encounters:   01/03/25 43.3 kg (95 lb 7.4 oz)   Body mass index is 16.39 kg/m².    Patient has been screened and assessed by RD.    Malnutrition Type:  Context: chronic illness  Level: severe    Malnutrition Characteristic Summary:  Weight Loss (Malnutrition): 20% in 1 year  Energy Intake (Malnutrition): less than or equal to 75% for greater than or equal to 1 month  Subcutaneous Fat (Malnutrition): severe depletion  Muscle Mass (Malnutrition): severe depletion    Interventions/Recommendations (treatment strategy):  1. Liberalized regular diet 2. Commercial beverage    She is eating drinking well      Impaired cognition  -confusional state noted in last few medical  encounters  -psych evaluated: Recommend discussing discharge options with patient, ideally with identified support person present. Wonder if intermediate option such as home health would be more in line with patient's expressed preferences. Do not feel that patient currently has the capacity to make decisions regarding discharge plan due to likely major neurocognitive disorder, which is unlikely to improve with treatment of UTI   3. Recommend outpatient follow up with neurology for dementia evaluation. , lacks capacity  [facility] placement denied.  Case management working on nursing home placement but given her impaired cognition without an official diagnosis of dementia patient would only be able to be admitted to the nursing home portion without memory care.  Patient will need to see Neurology as an outpatient for an official diagnosis although patient is clinically demented based on last 13 days of admission to the hospital.  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    OP follow-up with neurology set up for discharge    Await NH placement    Hypertension  Patient's blood pressure range in the last 24 hours was: BP  Min: 101/55  Max: 129/60.The patient's inpatient anti-hypertensive regimen is listed below:  Current Antihypertensives  bisoprolol-hydrochlorothiazide 2.5-6.25 mg per tablet 1 tablet, Daily, Oral    Will set up with PCP for medication management at discharge     BP stable      VTE Risk Mitigation (From admission, onward)           Ordered     enoxaparin injection 30 mg  Daily         12/30/24 0808     IP VTE HIGH RISK PATIENT  Once         12/24/24 1720     Place sequential compression device  Until discontinued         12/24/24 1720                    Discharge Planning   MEJIA:      Code Status: Full Code   Medical Readiness for  Discharge Date: 12/27/2024  Discharge Plan A: New Nursing Home placement - long-term care facility   Discharge Delays: None known at this time    Please place Justification for DME    Deepika Fisher MD  Department of Hospital Medicine   '[place] - Telemetry ([facility])

## 2025-01-09 NOTE — SUBJECTIVE & OBJECTIVE
Interval History: looks well, walking and talking, eating well, pleasantly confused, disoriented. Await placement, SW arranging.    Review of Systems   Unable to perform ROS: Dementia   Constitutional:  Positive for activity change.     Objective:     Vital Signs (Most Recent):  Temp: 98.5 °F (36.9 °C) (01/09/25 1545)  Pulse: 75 (01/09/25 1545)  Resp: 16 (01/09/25 1545)  BP: 127/68 (01/09/25 1545)  SpO2: 97 % (01/09/25 1545) Vital Signs (24h Range):  Temp:  [97.7 °F (36.5 °C)-98.7 °F (37.1 °C)] 98.5 °F (36.9 °C)  Pulse:  [70-84] 75  Resp:  [16-18] 16  SpO2:  [97 %-98 %] 97 %  BP: (101-129)/(55-77) 127/68     Weight: 43.3 kg (95 lb 7.4 oz)  Body mass index is 16.39 kg/m².  No intake or output data in the 24 hours ending 01/09/25 1730      Physical Exam  Vitals and nursing note reviewed.   Constitutional:       General: She is not in acute distress.     Appearance: Normal appearance. She is well-developed. She is not diaphoretic.   HENT:      Head: Normocephalic and atraumatic.      Nose: Nose normal.      Mouth/Throat:      Mouth: Mucous membranes are moist.   Eyes:      Extraocular Movements: Extraocular movements intact.      Conjunctiva/sclera: Conjunctivae normal.      Pupils: Pupils are equal, round, and reactive to light.   Cardiovascular:      Rate and Rhythm: Normal rate and regular rhythm.      Pulses: Normal pulses.      Heart sounds: Normal heart sounds. No murmur heard.     No friction rub. No gallop.   Pulmonary:      Effort: Pulmonary effort is normal. No respiratory distress.      Breath sounds: Normal breath sounds. No stridor. No wheezing or rales.   Abdominal:      General: Abdomen is flat. Bowel sounds are normal. There is no distension.      Palpations: Abdomen is soft. There is no mass.      Tenderness: There is no abdominal tenderness. There is no guarding.   Musculoskeletal:         General: Normal range of motion.      Cervical back: Normal range of motion and neck supple.   Skin:     General:  Skin is warm.      Capillary Refill: Capillary refill takes less than 2 seconds.      Findings: No erythema.   Neurological:      General: No focal deficit present.      Mental Status: She is alert. Mental status is at baseline. She is disoriented.   Psychiatric:         Mood and Affect: Mood normal.         Behavior: Behavior normal.             Significant Labs: All pertinent labs within the past 24 hours have been reviewed.    Significant Imaging: I have reviewed all pertinent imaging results/findings within the past 24 hours.

## 2025-01-09 NOTE — PLAN OF CARE
Per Sunday, with [facility] , they are giving Patient's friend one more day to get everything needed. Per Sunday, they have 90% of documents needed for patient.  Per Sunday, if patient's friend cannot get the documents today, they will accept Patient tomorrow, regardless and make it work on their end.     15 04 Per Sunday, with [facility], Patient's friend, Dominick Loco, did email more documentation/ financials needed. Sunday stated they can accept Patient tomorrow.     SW verbalized understanding.

## 2025-01-09 NOTE — PLAN OF CARE
Nutrition Recommendations/Interventions on 1/9/25:   1. Recommend pt continues on a Regular diet   2. Recommend pt continues Boost plus TID as warranted   3. Encourage PO intake and recommend feeding assistance/ tray set up   4. Weigh twice weekly     Goals:  1. Patient will tolerate and consume > 75% of EEN and EPN prior to RD follow up (Improving)  Nutrition Goal Status: progressing towards goal  Communication of RD Recs: other (comment) (POC, sticky note)    Tasha Alcaraz, RD, LDN, CNSC

## 2025-01-09 NOTE — PT/OT/SLP PROGRESS
"Occupational Therapy   Treatment    Name: Lucy Staples  MRN: [id number]  Admitting Diagnosis:  UTI (urinary tract infection)       Recommendations:     Discharge Recommendations: No Therapy Indicated  Discharge Equipment Recommendations:  none  Barriers to discharge:  None    Assessment:     Lucy Staples is a 79 y.o. female with a medical diagnosis of UTI (urinary tract infection).  She presents with the following performance deficits affecting function are weakness, impaired endurance, impaired self care skills, decreased upper extremity function, impaired functional mobility, decreased lower extremity function, gait instability, decreased safety awareness, impaired balance, pain.     Rehab Prognosis:  Good; patient would benefit from acute skilled OT services to address these deficits and reach maximum level of function.       Plan:     Patient to be seen 2 x/week to address the above listed problems via self-care/home management, therapeutic activities, therapeutic exercises  Plan of Care Expires: 01/19/25  Plan of Care Reviewed with: patient    Subjective     Chief Complaint: "No issues here"  Patient/Family Comments/goals: "Ready to leave"  Pain/Comfort:  Pain Rating 1: 0/10    Objective:     Communicated with: Amee and DANIEL prior to session.  Patient found ambulatory in room/milan with   upon OT entry to room.    General Precautions: Standard, fall    Orthopedic Precautions:N/A  Braces: N/A  Respiratory Status: Room air     Occupational Performance:     Treatment & Education:  OT role, plan of care, progression of goals, importance of continued OOB activity, ADL/functional transfer and mobility retraining, call don't fall, safety precautions, fall prevention.  Encouraged completion of B UE AROM therex throughout the day to increase functional strength and activity tolerance needed for ADL completion.  Pt displays FM and ADLs independence. Pt reviewed therex with therapist.    Patient " left ambulatory in room/milan with  no lines.    GOALS:   Multidisciplinary Problems       Occupational Therapy Goals          Problem: Occupational Therapy    Goal Priority Disciplines Outcome Interventions   Occupational Therapy Goal     OT, PT/OT Progressing    Description: Goals to be met by: 1/9/25     Patient will increase functional independence with ADLs by performing:    UE Dressing with [place].  Grooming while standing at sink with [place].  Toileting from toilet with [place] for hygiene and clothing management.   Toilet transfer to toilet with [place].  Upper extremity exercise program x15 reps per handout, with independence.                         Time Tracking:     OT Date of Treatment: 01/09/25  OT Start Time: 1355  OT Stop Time: 1410  OT Total Time (min): 15 min    Billable Minutes:Therapeutic Activity 15    OT/INDIO: INDIO     Number of INDIO visits since last OT visit: 2  MARTHA Patterson   1/9/2025

## 2025-01-09 NOTE — ASSESSMENT & PLAN NOTE
Nutrition consulted. Most recent weight and BMI monitored-     Measurements:  Wt Readings from Last 1 Encounters:   01/03/25 43.3 kg (95 lb 7.4 oz)   Body mass index is 16.39 kg/m².    Patient has been screened and assessed by RD.    Malnutrition Type:  Context: chronic illness  Level: severe    Malnutrition Characteristic Summary:  Weight Loss (Malnutrition): 20% in 1 year  Energy Intake (Malnutrition): less than or equal to 75% for greater than or equal to 1 month  Subcutaneous Fat (Malnutrition): severe depletion  Muscle Mass (Malnutrition): severe depletion    Interventions/Recommendations (treatment strategy):  1. Liberalized regular diet 2. Commercial beverage    She is eating drinking well

## 2025-01-10 VITALS
HEIGHT: 64 IN | DIASTOLIC BLOOD PRESSURE: 53 MMHG | TEMPERATURE: 99 F | BODY MASS INDEX: 16.29 KG/M2 | SYSTOLIC BLOOD PRESSURE: 109 MMHG | OXYGEN SATURATION: 97 % | HEART RATE: 70 BPM | WEIGHT: 95.44 LBS | RESPIRATION RATE: 20 BRPM

## 2025-01-10 LAB
ABETA 42/40 RATIO: 0.17
AL BETA40 PLAS-MCNC: 325 PG/ML
AL BETA42 PLAS-MCNC: 56 PG/ML

## 2025-01-10 PROCEDURE — 25000003 PHARM REV CODE 250: Performed by: STUDENT IN AN ORGANIZED HEALTH CARE EDUCATION/TRAINING PROGRAM

## 2025-01-10 PROCEDURE — G0378 HOSPITAL OBSERVATION PER HR: HCPCS

## 2025-01-10 PROCEDURE — 97530 THERAPEUTIC ACTIVITIES: CPT

## 2025-01-10 PROCEDURE — 96372 THER/PROPH/DIAG INJ SC/IM: CPT | Performed by: EMERGENCY MEDICINE

## 2025-01-10 PROCEDURE — 63600175 PHARM REV CODE 636 W HCPCS: Performed by: EMERGENCY MEDICINE

## 2025-01-10 RX ORDER — DOCUSATE SODIUM 100 MG/1
100 CAPSULE, LIQUID FILLED ORAL DAILY PRN
Start: 2025-01-10

## 2025-01-10 RX ORDER — TRAZODONE HYDROCHLORIDE 50 MG/1
50 TABLET ORAL NIGHTLY PRN
Start: 2025-01-10 | End: 2026-01-10

## 2025-01-10 RX ORDER — CYANOCOBALAMIN 1000 UG/ML
1000 INJECTION, SOLUTION INTRAMUSCULAR; SUBCUTANEOUS DAILY
Status: DISCONTINUED | OUTPATIENT
Start: 2025-01-10 | End: 2025-01-10 | Stop reason: HOSPADM

## 2025-01-10 RX ORDER — TALC
6 POWDER (GRAM) TOPICAL NIGHTLY PRN
Start: 2025-01-10

## 2025-01-10 RX ORDER — CYANOCOBALAMIN 1000 UG/ML
1000 INJECTION, SOLUTION INTRAMUSCULAR; SUBCUTANEOUS
Start: 2025-01-10 | End: 2025-01-20

## 2025-01-10 RX ADMIN — CYANOCOBALAMIN 1000 MCG: 1000 INJECTION INTRAMUSCULAR; SUBCUTANEOUS at 09:01

## 2025-01-10 RX ADMIN — FLUTICASONE PROPIONATE 100 MCG: 50 SPRAY, METERED NASAL at 08:01

## 2025-01-10 RX ADMIN — ASPIRIN 81 MG: 81 TABLET, COATED ORAL at 08:01

## 2025-01-10 NOTE — PLAN OF CARE
A241/A241 SVITLANACally Staples is a 79 y.o.female admitted on 12/24/2024 for UTI (urinary tract infection)   Code Status: Full Code MRN: [id number]   Review of patient's allergies indicates:   Allergen Reactions    Pcn [penicillins] Hives     Past Medical History:   Diagnosis Date    History of oral surgery     Hyperlipidemia     Hypertension     IBS (irritable bowel syndrome)     Rectocele     Recurrent UTI       PRN meds    dextrose 50%, 12.5 g, PRN  dextrose 50%, 25 g, PRN  docusate sodium, 100 mg, Daily PRN  glucagon (human recombinant), 1 mg, PRN  glucose, 16 g, PRN  glucose, 24 g, PRN  melatonin, 6 mg, Nightly PRN  naloxone, 0.02 mg, PRN  sodium chloride 0.9%, 10 mL, Q12H PRN  traZODone, 50 mg, Nightly PRN      Chart check completed. Will continue plan of care.      Orientation: disoriented to, place, time, situation  [place] Coma Scale Score: 15     Lead Monitored:  (refused tele) Rhythm: normal sinus rhythm, other (see comments)    Cardiac/Telemetry Box Number: [id number]  VTE Core Measure: Pharmacological prophylaxis initiated/maintained Last Bowel Movement: 01/09/25  Diet Adult Regular  Diet Cardiac  Voiding Characteristics: frequency  Kraig Score: 22  Fall Risk Score: 11  Accucheck []   Freq?      Lines/Drains/Airways       None

## 2025-01-10 NOTE — PLAN OF CARE
"RUPAL verified with Alhaji, at [facility], that Patient can DC to [facility] today.  Alhaji verified Patient can be DC to them today.  RUPAL meet with Attending to place DC orders and complete medication recs. Attending placed DC orders with completed med rec and SW sent to [facility].    Per Sunday, he will get a time for transportation, following their morning meeting. Sunday stated they will send their wheelchair van and also have an accompanied rider for Patient.     SW contacted Patient's friend, Dominick Loco, letting him know of discharge today. Patient's friend, Dominick, was appreciative and grateful for the care Patient has received during the hospitalization. SW stated she is going to meet with Patient regarding discharging, "moving" today. Patient's friend, Dominick, verbalized understanding.    SW meet with Patient at bedside to let her know she was going to be discharging from the hospital today. SW let Patient know that she was going to [facility] today and they will send a van, so Patient will not have to drive to the facility. RUPAL stated that [facility] will also be sending a rider/ friend for Patient during the ride. Patient verbalized understanding.     SW will continue to follow and assist as needed.   "

## 2025-01-10 NOTE — PT/OT/SLP PROGRESS
"Occupational Therapy   Treatment and Discharge    Name: Lucy Staples  MRN: [id number]  Admitting Diagnosis:  UTI (urinary tract infection)       Recommendations:     Discharge Recommendations: No Therapy Indicated  Discharge Equipment Recommendations:  none  Barriers to discharge:  None    Assessment:     Lucy Staples is a 79 y.o. female with a medical diagnosis of UTI (urinary tract infection).    Patient has returned to independent functional baseline. Completed all ADLs and mobility independently both in and out of room. Continued skilled intervention not warranted.    Plan:     Discharge OT.  Plan of Care Reviewed with: patient    Subjective     Chief Complaint: Reported "I like my new little place here."  Patient/Family Comments/goals: none reported  Pain/Comfort:  Pain Rating 1: 0/10    Objective:     Communicated with: Nurse, Natanael, prior to session.  Patient found supine with   upon OT entry to room.    General Precautions: Standard, fall    Orthopedic Precautions:N/A  Braces: N/A  Respiratory Status: Room air     Occupational Performance:     Bed Mobility:    Patient completed Supine to Sit with independence     Functional Mobility/Transfers:  Patient completed Sit <> Stand Transfer with independence  with  no assistive device   Patient completed Bed <> Chair Transfer using Step Transfer technique with independence with no assistive device  Functional Mobility: Patient completed ~220ft functional mobility, independently, to promote continued activity throughout hospitalization.    Activities of Daily Living:  Grooming: independence .  Upper Body Dressing: independence including button management.  Lower Body Dressing: independence .    [facility] 6 Click ADL: 24    Treatment & Education:  Patient educated on role of OT in acute setting and benefits of OOB activity. Encouraged OOB throughout the course of hospitalization to decrease risk of hospital related debility. Patient stated " understanding and in agreement with POC.    Patient left up in chair with call button in reach    GOALS:   Multidisciplinary Problems       Occupational Therapy Goals       Not on file              Multidisciplinary Problems (Resolved)          Problem: Occupational Therapy    Goal Priority Disciplines Outcome Interventions   Occupational Therapy Goal   (Resolved)     OT, PT/OT Met    Description: Goals to be met by: 1/9/25     Patient will increase functional independence with ADLs by performing:    UE Dressing with [place].  Grooming while standing at sink with [place].  Toileting from toilet with [place] for hygiene and clothing management.   Toilet transfer to toilet with [place].  Upper extremity exercise program x15 reps per handout, with independence.                         Time Tracking:     OT Date of Treatment: 01/10/25  OT Start Time: 0830  OT Stop Time: 0840  OT Total Time (min): 10 min    Billable Minutes:Therapeutic Activity 10    Aundrea Gonzalez OT  1/10/2025

## 2025-01-10 NOTE — PLAN OF CARE
A241/A241 SVITLANACally Staples is a 79 y.o.female admitted on 12/24/2024 for UTI (urinary tract infection)   Code Status: Full Code MRN: [id number]   Review of patient's allergies indicates:   Allergen Reactions    Pcn [penicillins] Hives     Past Medical History:   Diagnosis Date    History of oral surgery     Hyperlipidemia     Hypertension     IBS (irritable bowel syndrome)     Rectocele     Recurrent UTI       PRN meds    dextrose 50%, 12.5 g, PRN  dextrose 50%, 25 g, PRN  docusate sodium, 100 mg, Daily PRN  glucagon (human recombinant), 1 mg, PRN  glucose, 16 g, PRN  glucose, 24 g, PRN  melatonin, 6 mg, Nightly PRN  naloxone, 0.02 mg, PRN  sodium chloride 0.9%, 10 mL, Q12H PRN  traZODone, 50 mg, Nightly PRN      Chart check completed. Will continue plan of care.      Orientation: disoriented to, place, situation, time  [place] Coma Scale Score: 14     Lead Monitored: other (see comments) (No tele monitor) Rhythm: normal sinus rhythm    Cardiac/Telemetry Box Number: [id number]  VTE Core Measure: Pharmacological prophylaxis initiated/maintained Last Bowel Movement: 01/06/25  Diet Adult Regular  Voiding Characteristics: frequency  Kraig Score: 22  Fall Risk Score: 11  Accucheck []   Freq?      Lines/Drains/Airways       None

## 2025-01-10 NOTE — PLAN OF CARE
[facility] - Telemetry (Hospital)  Discharge Final Note    Primary Care Provider: Lamin Webber MD    Expected Discharge Date: 1/10/2025    Final Discharge Note (most recent)       Final Note - 01/10/25 1131          Final Note    Assessment Type Final Discharge Note     Anticipated Discharge Disposition senior care Nursing Facility        Post-Acute Status    Post-Acute Authorization Placement     Post-Acute Placement Status Set-up Complete/Auth obtained     Coverage [employer]     Discharge Delays None known at this time                     Important Message from Medicare              Follow-up providers       Lamin Webber MD   Specialty: Internal Medicine   Relationship: PCP - General    [address] [facility] [address]   Phone: [phone]       Next Steps: Follow up              After-discharge care                Destination       *[facility]   Service: Nursing Home    [address]  [facility] [address]   Phone: [phone]                             DC Disposition: [facility]  Family Notified: Patient and friend, Dominick Loco, over the phone  Transportation: [facility], 11:50 am - [facility] notified    Patient needed placement for senior care Nursing Home, with a locked memory unit. Patient was accepted and Patient's friend, Dominick Loco, brought all documentation to [facility] for acceptance. Per Alhaji, their transportation van is currently in the shop and  could not be until this afternoon, once the van was returned. Per  Director and  Manager, Patient can be set up with [facility] transportation. [facility] scheduled for 11:50 am. Per Sunday, with [facility], Patient's friend will meet Patient at [facility] today.

## 2025-01-12 NOTE — DISCHARGE SUMMARY
[facility] Medicine  Discharge Summary      Patient Name: Lucy Staples  MRN: [id number]  CRISTI: [id number]  Patient Class: OP- Observation  Admission Date: 2024  Hospital Length of Stay: 0 days  Discharge Date and Time: 1/10/2025 12:58 PM  Attending Physician: Kayla att. providers found   Discharging Provider: Deepika Fisher MD  Primary Care Provider: Lamin Webber MD    Primary Care Team: Networked reference to record PCT     HPI:   This is an 79-year-old woman who lives alone with hypertension, hyperlipidemia who was confused and called the police department, ultimately ended up in the hospital and was found to have evidence of a UTI     Patient is accompanied by her friend from [facility], Dominick Loco ([phone]) who explains patient has a good support network with friends and neighbors who help to take care of her at home by checking on her intermittently.  It is unclear what happened today, the patient does not remember, but per the ED notes the patient could not locate her keys and called the police department who then called for an ambulance.  The patient is asymptomatic at this time and of note denies dysuria or urinary frequency.  She states that she still drives and then explained that her license has .      It sounds like the home situation may not be tenable anymore as the patient likely has undiagnosed dementia and difficulty caring for herself.  Does have a PCP but does not see her regularly.  On the day of appointments she gets worried and then does not go.  Been and her friends have been trying to get her to see the doctor unsuccessfully.      In the ED, vitals notable for some hypertension with blood pressure is 160/70s.  CBC and CMP were normal.  Urinalysis with 11 WBCs per high-power field and some leukocyte esterase.  She was diagnosed with a urinary tract infection and referred for admission.  She was started on ciprofloxacin    * No surgery found *       Hospital Course:   The patient presented to the ED after calling the police. She was noted to have confusion. Police department called EMS. Workup consistent with UTI. Patient placed on empiric antibiotics. Psychiatry consulted as patient does not appear to have capacity. Urine culture with multiple organisms. Repeat UA. Participated with therapy. 24 hour supervision recommended. Patient does not have 24 hour supervision. She has friends who check on her but are unable to provide 24 hour supervision. Skilled placement being pursued. Patient accepted to facility, awaiting insurance authorization.    Alert to self, in bed. Psych robe initially recommends discussing discharge options with patient, ideally with identified support person present. Wonder if intermediate option such as home health would be more in line with patient's expressed preferences. After more discussion with staff, patient's friends and caregiver, psych does not feel that patient currently has the capacity to make decisions regarding discharge plan due to likely major neurocognitive disorder, which is unlikely to improve with treatment of UTI : Able to state she was in hospital, but unable to say which or where. Some confusion regarding current events.CM working on insurance authorization for SNF placement.     continues to work on insurance verification for placement. Was denied SNF placement by insurance. CM will move to potential nursing home placement.  Due to lack of PCP follow up patient does not have an official diagnosis of dementia although shows clear signs of dementia.  Workup to rule out other causes of altered mental status including TSH, RPR, tau protein, amyloid, RPR, folate level, and B12 has been ordered.  Neurology was consulted and discussion was held with the neurologist who stated that an inpatient diagnosis of dementia is not possible and therefore would need to follow up as an outpatient with Neurology.  Given  "her situation with lack of family, patient is not a safe discharge home.  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  She roams the halls and therefore a risk for elopement at a nursing home without a memory care unit.  She is pleasantly demented and is not aggressive or agitated.    01/07/2025  Awaiting SNF placement.    1/8- advanced dementia- social disposition, awaiting NH placement, doing well, eating drinking well, walking around well.     1/9- looks well, walking and talking, eating well, pleasantly confused, disoriented. Await placement, SW arranging.    1/10- looks great, standing in the milan, all dressed up and ready to go to her new NH where she has been accepted. She is eating drinking well, walking around, she was seen and examined and deemed stable for discharge home today.      Goals of Care Treatment Preferences:  Code Status: Full Code      SDOH Screening:  The patient was screened for utility difficulties, food insecurity, transport difficulties, housing insecurity, and interpersonal safety and there were no concerns identified this admission.     Consults:   Consults (From admission, onward)          Status Ordering Provider     Inpatient consult to Telemedicine-General Neurology  Once        Provider:  Ramos Cline MD    Completed PAYAM MEEK     Inpatient consult to Social Work/Case Management  Once        Provider:  (Not yet assigned)    Completed SHAAN GERONIMO     Inpatient consult to Registered Dietitian/Nutritionist  Once        Provider:  (Not yet assigned)    Completed SHAAN GERONIMO     Inpatient consult to Telemedicine - Psyc  Once        Provider:  Erica Garcia MD    Completed SHAAN GERONIMO            Moderate malnutrition  Nutrition consulted. Most recent weight and BMI monitored-     Measurements:  Wt Readings from Last 1 Encounters: "   01/03/25 43.3 kg (95 lb 7.4 oz)   Body mass index is 16.39 kg/m².    Patient has been screened and assessed by RD.    Malnutrition Type:  Context: chronic illness  Level: severe    Malnutrition Characteristic Summary:  Weight Loss (Malnutrition): 20% in 1 year  Energy Intake (Malnutrition): less than or equal to 75% for greater than or equal to 1 month  Subcutaneous Fat (Malnutrition): severe depletion  Muscle Mass (Malnutrition): severe depletion    Interventions/Recommendations (treatment strategy):  1. Liberalized regular diet 2. Commercial beverage    She is eating drinking well      Impaired cognition  -confusional state noted in last few medical encounters  -psych evaluated: Recommend discussing discharge options with patient, ideally with identified support person present. Wonder if intermediate option such as home health would be more in line with patient's expressed preferences. Do not feel that patient currently has the capacity to make decisions regarding discharge plan due to likely major neurocognitive disorder, which is unlikely to improve with treatment of UTI   3. Recommend outpatient follow up with neurology for dementia evaluation. , lacks capacity  [facility] placement denied.  Case management working on nursing home placement but given her impaired cognition without an official diagnosis of dementia patient would only be able to be admitted to the nursing home portion without memory care.  Patient will need to see Neurology as an outpatient for an official diagnosis although patient is clinically demented based on last 13 days of admission to the hospital.  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    OP follow-up with neurology set up for discharge    Await NH placement    Hypertension  Patient's blood pressure range in  the last 24 hours was: BP  Min: 101/55  Max: 129/60.The patient's inpatient anti-hypertensive regimen is listed below:  Current Antihypertensives  bisoprolol-hydrochlorothiazide 2.5-6.25 mg per tablet 1 tablet, Daily, Oral    Will set up with PCP for medication management at discharge     BP stable      Final Active Diagnoses:    Diagnosis Date Noted POA    Moderate malnutrition [E44.0] 12/26/2024 Yes    Hypertension [I10] 12/24/2024 Yes    Impaired cognition [R41.89] 12/24/2024 Yes      Problems Resolved During this Admission:    Diagnosis Date Noted Date Resolved POA    PRINCIPAL PROBLEM:  UTI (urinary tract infection) [N39.0] 12/24/2024 01/08/2025 Yes       Discharged Condition: stable    Disposition: Skilled Nursing Facility    Follow Up:   Contact information for follow-up providers       Lamin Webber MD .    Specialty: Internal Medicine  Contact information:  [address] [facility] [address] [phone]                       Contact information for after-discharge care       Destination       [facility] .    Service: Nursing Home  Contact information:  [address] [facility] [address]  [facility] [address] [phone]                                 Patient Instructions:      Diet Cardiac     Activity as tolerated       Significant Diagnostic Studies: Labs: All labs within the past 24 hours have been reviewed  Radiology:   Imaging Results              X-Ray Chest AP Portable (Final result)  Result time 12/24/24 14:02:35      Final result by Thien Peoples MD (12/24/24 14:02:35)                   Impression:      No acute process seen.      Electronically signed by: Thien Peoples MD  Date:    12/24/2024  Time:    14:02               Narrative:    EXAMINATION:  XR CHEST AP PORTABLE    CLINICAL HISTORY:  ams;    FINDINGS:  Single view of the chest.  No comparison    Cardiac silhouette is normal.  COPD and emphysematous changes within the lungs.  The lungs demonstrate no evidence  of active disease.  No evidence of pleural effusion or pneumothorax.  Bones appear intact.  Moderate degenerative changes and moderate atherosclerotic disease.                                       CT Head Without Contrast (Final result)  Result time 12/24/24 13:34:37      Final result by Thien Peoples MD (12/24/24 13:34:37)                   Impression:      Chronic microvascular ischemic changes.      Electronically signed by: Thien Peoples MD  Date:    12/24/2024  Time:    13:34               Narrative:    EXAMINATION:  CT HEAD WITHOUT CONTRAST    CLINICAL HISTORY:  Mental status change, unknown cause;    TECHNIQUE:  Low dose axial CT images obtained throughout the head without intravenous contrast. Sagittal and coronal reconstructions were performed.  All CT scans at this facility use dose modulation, iterative reconstruction and/or weight based dosing when appropriate to reduce radiation dose to as low as reasonably achievable.    COMPARISON:  None.    FINDINGS:  Intracranial compartment:    The brain parenchyma demonstrates areas of decreased attenuation with moderate periventricular white matter consistent with chronic microvascular ischemic changes..  No parenchymal mass, hemorrhage, edema or major vascular distribution infarct.  Vascular calcifications are noted.    Mild prominence of the sulci and ventricles are consistent with age-related involutional changes.    No extra-axial blood or fluid collections.    Skull/extracranial contents (limited evaluation): No fracture. Mastoid air cells and paranasal sinuses are essentially clear.                                     Cardiac Graphics: Echocardiogram: Transthoracic echo (TTE) complete (Cupid Only): No results found for this or any previous visit.    Pending Diagnostic Studies:       None           Medications:  Reconciled Home Medications:      Medication List        START taking these medications      cyanocobalamin 1,000 mcg/mL injection  Inject 1 mL  (1,000 mcg total) into the muscle every 72 hours as needed.     docusate sodium 100 MG capsule  Commonly known as: COLACE  Take 1 capsule (100 mg total) by mouth daily as needed for Constipation.     melatonin 3 mg tablet  Commonly known as: MELATIN  Take 2 tablets (6 mg total) by mouth nightly as needed for Insomnia.     traZODone 50 MG tablet  Commonly known as: DESYREL  Take 1 tablet (50 mg total) by mouth nightly as needed for Insomnia.            CONTINUE taking these medications      acetaminophen 650 MG Tbsr  Commonly known as: TYLENOL  Take 650 mg by mouth continuous prn.     aspirin 81 MG EC tablet  Commonly known as: ECOTRIN  Take 81 mg by mouth once daily.     bisoprolol-hydrochlorothiazide 5-6.25 mg 5-6.25 mg Tab  Commonly known as: ZIAC  Take 1 tablet by mouth once daily.     CALCIUM ANTACID 300 mg (750 mg) Chew  Generic drug: calcium carbonate  Take 1 tablet by mouth 2 (two) times daily.     estradioL 0.01 % (0.1 mg/gram) vaginal cream  Commonly known as: ESTRACE  INSERT 1 GRAM IN THE VAGINA TWICE WEEKLY AS DIRECTED     lansoprazole 30 MG capsule  Commonly known as: PREVACID  Take 30 mg by mouth as needed.     simvastatin 20 MG tablet  Commonly known as: ZOCOR  Take 20 mg by mouth every evening.              Indwelling Lines/Drains at time of discharge:   Lines/Drains/Airways       None                   Time spent on the discharge of patient: 37 minutes         Deepika Fisher MD  Department of Hospital Medicine  '[facility] - Telemetry ([facility])